# Patient Record
Sex: MALE | NOT HISPANIC OR LATINO | URBAN - METROPOLITAN AREA
[De-identification: names, ages, dates, MRNs, and addresses within clinical notes are randomized per-mention and may not be internally consistent; named-entity substitution may affect disease eponyms.]

---

## 2022-07-20 DIAGNOSIS — Z12.11 ENCOUNTER FOR SCREENING FOR MALIGNANT NEOPLASM OF COLON: Primary | ICD-10-CM

## 2025-02-04 ENCOUNTER — HOSPITAL ENCOUNTER (INPATIENT)
Facility: HOSPITAL | Age: 65
LOS: 17 days | Discharge: NURSING, REHAB OR LONG-TERM CARE | DRG: 003 | End: 2025-02-21
Attending: EMERGENCY MEDICINE | Admitting: SURGERY
Payer: COMMERCIAL

## 2025-02-04 ENCOUNTER — ANESTHESIA (OUTPATIENT)
Dept: SURGERY | Facility: HOSPITAL | Age: 65
DRG: 003 | End: 2025-02-04
Payer: COMMERCIAL

## 2025-02-04 ENCOUNTER — ANESTHESIA EVENT (OUTPATIENT)
Dept: SURGERY | Facility: HOSPITAL | Age: 65
DRG: 003 | End: 2025-02-04
Payer: COMMERCIAL

## 2025-02-04 DIAGNOSIS — I49.8 BIGEMINY: ICD-10-CM

## 2025-02-04 DIAGNOSIS — S02.119A CLOSED FRACTURE OF OCCIPITAL BONE, UNSPECIFIED LATERALITY, UNSPECIFIED OCCIPITAL FRACTURE TYPE, INITIAL ENCOUNTER: ICD-10-CM

## 2025-02-04 DIAGNOSIS — S22.31XA CLOSED FRACTURE OF ONE RIB OF RIGHT SIDE, INITIAL ENCOUNTER: ICD-10-CM

## 2025-02-04 DIAGNOSIS — T14.90XA TRAUMA: ICD-10-CM

## 2025-02-04 DIAGNOSIS — I63.9 STROKE: ICD-10-CM

## 2025-02-04 DIAGNOSIS — S06.5XAA SUBDURAL HEMATOMA: Primary | ICD-10-CM

## 2025-02-04 DIAGNOSIS — S06.339A: ICD-10-CM

## 2025-02-04 DIAGNOSIS — T79.9XXA EARLY COMPLICATION OF TRAUMA, INITIAL ENCOUNTER: ICD-10-CM

## 2025-02-04 DIAGNOSIS — R40.2430 GLASGOW COMA SCALE TOTAL SCORE 3-8, UNSPECIFIED COMA TIMING: ICD-10-CM

## 2025-02-04 PROBLEM — I60.9 SAH (SUBARACHNOID HEMORRHAGE): Status: ACTIVE | Noted: 2025-02-04

## 2025-02-04 LAB
ABORH RETYPE: NORMAL
ALLENS TEST BLOOD GAS (OHS): ABNORMAL
ANION GAP SERPL CALC-SCNC: 16 MEQ/L
APTT PPP: 35 SECONDS (ref 23.2–33.7)
BACTERIA #/AREA URNS AUTO: ABNORMAL /HPF
BASE EXCESS BLD CALC-SCNC: 1.6 MMOL/L (ref -2–2)
BASOPHILS # BLD AUTO: 0.03 X10(3)/MCL
BASOPHILS NFR BLD AUTO: 0.2 %
BILIRUB UR QL STRIP.AUTO: NEGATIVE
BLOOD GAS SAMPLE TYPE (OHS): ABNORMAL
BUN SERPL-MCNC: 22.5 MG/DL (ref 8.4–25.7)
CA-I BLD-SCNC: 1.16 MMOL/L (ref 1.12–1.23)
CALCIUM SERPL-MCNC: 8.9 MG/DL (ref 8.8–10)
CHLORIDE SERPL-SCNC: 103 MMOL/L (ref 98–107)
CLARITY UR: ABNORMAL
CO2 BLDA-SCNC: 25.6 MMOL/L
CO2 SERPL-SCNC: 21 MMOL/L (ref 23–31)
COHGB MFR BLDA: 2.3 % (ref 0.5–1.5)
COLOR UR AUTO: ABNORMAL
CREAT SERPL-MCNC: 1.12 MG/DL (ref 0.72–1.25)
CREAT/UREA NIT SERPL: 20
DRAWN BY BLOOD GAS (OHS): ABNORMAL
EOSINOPHIL # BLD AUTO: 0 X10(3)/MCL (ref 0–0.9)
EOSINOPHIL NFR BLD AUTO: 0 %
ERYTHROCYTE [DISTWIDTH] IN BLOOD BY AUTOMATED COUNT: 13.9 % (ref 11.5–17)
GFR SERPLBLD CREATININE-BSD FMLA CKD-EPI: >60 ML/MIN/1.73/M2
GLUCOSE SERPL-MCNC: 249 MG/DL (ref 82–115)
GLUCOSE UR QL STRIP: ABNORMAL
GROUP & RH: NORMAL
HCO3 BLDA-SCNC: 24.6 MMOL/L (ref 22–26)
HCT VFR BLD AUTO: 40.3 % (ref 42–52)
HGB BLD-MCNC: 14.3 G/DL (ref 14–18)
HGB UR QL STRIP: ABNORMAL
IMM GRANULOCYTES # BLD AUTO: 0.04 X10(3)/MCL (ref 0–0.04)
IMM GRANULOCYTES NFR BLD AUTO: 0.3 %
INDIRECT COOMBS: NORMAL
INHALED O2 CONCENTRATION: 30 %
INR PPP: 1.2
KETONES UR QL STRIP: ABNORMAL
LACTATE SERPL-SCNC: 1.8 MMOL/L (ref 0.5–2.2)
LEUKOCYTE ESTERASE UR QL STRIP: 75
LYMPHOCYTES # BLD AUTO: 1.25 X10(3)/MCL (ref 0.6–4.6)
LYMPHOCYTES NFR BLD AUTO: 10.3 %
MCH RBC QN AUTO: 30.1 PG (ref 27–31)
MCHC RBC AUTO-ENTMCNC: 35.5 G/DL (ref 33–36)
MCV RBC AUTO: 84.8 FL (ref 80–94)
MECH RR (OHS): 20 B/MIN
METHGB MFR BLDA: 0.9 % (ref 0.4–1.5)
MODE (OHS): AC
MONOCYTES # BLD AUTO: 0.95 X10(3)/MCL (ref 0.1–1.3)
MONOCYTES NFR BLD AUTO: 7.8 %
MUCOUS THREADS URNS QL MICRO: ABNORMAL /LPF
NEUTROPHILS # BLD AUTO: 9.9 X10(3)/MCL (ref 2.1–9.2)
NEUTROPHILS NFR BLD AUTO: 81.4 %
NITRITE UR QL STRIP: NEGATIVE
NRBC BLD AUTO-RTO: 0 %
O2 HB BLOOD GAS (OHS): 94.4 % (ref 94–97)
OXYHGB MFR BLDA: 13.6 G/DL (ref 12–16)
PCO2 BLDA: 33 MMHG (ref 35–45)
PEEP RESPIRATORY: 5 CMH2O
PH BLDA: 7.48 [PH] (ref 7.35–7.45)
PH UR STRIP: 5.5 [PH]
PLATELET # BLD AUTO: 234 X10(3)/MCL (ref 130–400)
PMV BLD AUTO: 9.6 FL (ref 7.4–10.4)
PO2 BLDA: 74 MMHG (ref 80–100)
POCT GLUCOSE: 288 MG/DL (ref 70–110)
POTASSIUM BLOOD GAS (OHS): 3.5 MMOL/L (ref 3.5–5)
POTASSIUM SERPL-SCNC: 3.5 MMOL/L (ref 3.5–5.1)
PROT UR QL STRIP: ABNORMAL
PROTHROMBIN TIME: 15.3 SECONDS (ref 12.5–14.5)
RBC # BLD AUTO: 4.75 X10(6)/MCL (ref 4.7–6.1)
RBC #/AREA URNS AUTO: >100 /HPF
SAMPLE SITE BLOOD GAS (OHS): ABNORMAL
SAO2 % BLDA: 95.7 %
SODIUM BLOOD GAS (OHS): 135 MMOL/L (ref 137–145)
SODIUM SERPL-SCNC: 140 MMOL/L (ref 136–145)
SP GR UR STRIP.AUTO: 1.04 (ref 1–1.03)
SPECIMEN OUTDATE: NORMAL
SPONT+MECH VT ON VENT: 500 ML
SQUAMOUS #/AREA URNS LPF: ABNORMAL /HPF
UROBILINOGEN UR STRIP-ACNC: NORMAL
WBC # BLD AUTO: 12.17 X10(3)/MCL (ref 4.5–11.5)
WBC #/AREA URNS AUTO: ABNORMAL /HPF

## 2025-02-04 PROCEDURE — 86900 BLOOD TYPING SEROLOGIC ABO: CPT | Performed by: ANESTHESIOLOGY

## 2025-02-04 PROCEDURE — 36000711: Performed by: NEUROLOGICAL SURGERY

## 2025-02-04 PROCEDURE — 25000003 PHARM REV CODE 250

## 2025-02-04 PROCEDURE — 63600175 PHARM REV CODE 636 W HCPCS

## 2025-02-04 PROCEDURE — 63600175 PHARM REV CODE 636 W HCPCS: Performed by: NEUROLOGICAL SURGERY

## 2025-02-04 PROCEDURE — 27200966 HC CLOSED SUCTION SYSTEM

## 2025-02-04 PROCEDURE — 25000003 PHARM REV CODE 250: Performed by: SURGERY

## 2025-02-04 PROCEDURE — 83605 ASSAY OF LACTIC ACID: CPT

## 2025-02-04 PROCEDURE — 80048 BASIC METABOLIC PNL TOTAL CA: CPT | Performed by: EMERGENCY MEDICINE

## 2025-02-04 PROCEDURE — 99223 1ST HOSP IP/OBS HIGH 75: CPT | Mod: FS,,, | Performed by: PSYCHIATRY & NEUROLOGY

## 2025-02-04 PROCEDURE — 36415 COLL VENOUS BLD VENIPUNCTURE: CPT

## 2025-02-04 PROCEDURE — 99900035 HC TECH TIME PER 15 MIN (STAT)

## 2025-02-04 PROCEDURE — 94760 N-INVAS EAR/PLS OXIMETRY 1: CPT

## 2025-02-04 PROCEDURE — 63600175 PHARM REV CODE 636 W HCPCS: Performed by: NURSE ANESTHETIST, CERTIFIED REGISTERED

## 2025-02-04 PROCEDURE — 36000710: Performed by: NEUROLOGICAL SURGERY

## 2025-02-04 PROCEDURE — 27201423 OPTIME MED/SURG SUP & DEVICES STERILE SUPPLY: Performed by: NEUROLOGICAL SURGERY

## 2025-02-04 PROCEDURE — 87075 CULTR BACTERIA EXCEPT BLOOD: CPT | Performed by: NEUROLOGICAL SURGERY

## 2025-02-04 PROCEDURE — 99900031 HC PATIENT EDUCATION (STAT)

## 2025-02-04 PROCEDURE — 81001 URINALYSIS AUTO W/SCOPE: CPT | Performed by: SURGERY

## 2025-02-04 PROCEDURE — 94002 VENT MGMT INPAT INIT DAY: CPT

## 2025-02-04 PROCEDURE — 27100171 HC OXYGEN HIGH FLOW UP TO 24 HOURS

## 2025-02-04 PROCEDURE — 25000003 PHARM REV CODE 250: Performed by: NURSE ANESTHETIST, CERTIFIED REGISTERED

## 2025-02-04 PROCEDURE — 37000008 HC ANESTHESIA 1ST 15 MINUTES: Performed by: NEUROLOGICAL SURGERY

## 2025-02-04 PROCEDURE — 99285 EMERGENCY DEPT VISIT HI MDM: CPT | Mod: 25

## 2025-02-04 PROCEDURE — 37000009 HC ANESTHESIA EA ADD 15 MINS: Performed by: NEUROLOGICAL SURGERY

## 2025-02-04 PROCEDURE — 36415 COLL VENOUS BLD VENIPUNCTURE: CPT | Performed by: SURGERY

## 2025-02-04 PROCEDURE — 00C40ZZ EXTIRPATION OF MATTER FROM INTRACRANIAL SUBDURAL SPACE, OPEN APPROACH: ICD-10-PCS | Performed by: NEUROLOGICAL SURGERY

## 2025-02-04 PROCEDURE — 99900026 HC AIRWAY MAINTENANCE (STAT)

## 2025-02-04 PROCEDURE — 5A1955Z RESPIRATORY VENTILATION, GREATER THAN 96 CONSECUTIVE HOURS: ICD-10-PCS | Performed by: SURGERY

## 2025-02-04 PROCEDURE — 20000000 HC ICU ROOM

## 2025-02-04 PROCEDURE — D9220A PRA ANESTHESIA: Mod: CRNA,,, | Performed by: NURSE ANESTHETIST, CERTIFIED REGISTERED

## 2025-02-04 PROCEDURE — 87070 CULTURE OTHR SPECIMN AEROBIC: CPT | Performed by: NEUROLOGICAL SURGERY

## 2025-02-04 PROCEDURE — 86923 COMPATIBILITY TEST ELECTRIC: CPT | Mod: 91 | Performed by: ANESTHESIOLOGY

## 2025-02-04 PROCEDURE — 82803 BLOOD GASES ANY COMBINATION: CPT

## 2025-02-04 PROCEDURE — D9220A PRA ANESTHESIA: Mod: ANES,,, | Performed by: ANESTHESIOLOGY

## 2025-02-04 PROCEDURE — 99223 1ST HOSP IP/OBS HIGH 75: CPT | Mod: FS,,, | Performed by: SURGERY

## 2025-02-04 PROCEDURE — 85025 COMPLETE CBC W/AUTO DIFF WBC: CPT | Performed by: EMERGENCY MEDICINE

## 2025-02-04 PROCEDURE — 85730 THROMBOPLASTIN TIME PARTIAL: CPT | Performed by: EMERGENCY MEDICINE

## 2025-02-04 PROCEDURE — 63600175 PHARM REV CODE 636 W HCPCS: Performed by: EMERGENCY MEDICINE

## 2025-02-04 PROCEDURE — 94761 N-INVAS EAR/PLS OXIMETRY MLT: CPT

## 2025-02-04 PROCEDURE — 96374 THER/PROPH/DIAG INJ IV PUSH: CPT

## 2025-02-04 PROCEDURE — 63600175 PHARM REV CODE 636 W HCPCS: Performed by: NURSE PRACTITIONER

## 2025-02-04 PROCEDURE — 27800903 OPTIME MED/SURG SUP & DEVICES OTHER IMPLANTS: Performed by: NEUROLOGICAL SURGERY

## 2025-02-04 PROCEDURE — 20800000 HC ICU TRAUMA

## 2025-02-04 PROCEDURE — 37799 UNLISTED PX VASCULAR SURGERY: CPT

## 2025-02-04 PROCEDURE — 0BH17EZ INSERTION OF ENDOTRACHEAL AIRWAY INTO TRACHEA, VIA NATURAL OR ARTIFICIAL OPENING: ICD-10-PCS | Performed by: SURGERY

## 2025-02-04 PROCEDURE — 00U20KZ SUPPLEMENT DURA MATER WITH NONAUTOLOGOUS TISSUE SUBSTITUTE, OPEN APPROACH: ICD-10-PCS | Performed by: NEUROLOGICAL SURGERY

## 2025-02-04 PROCEDURE — 85610 PROTHROMBIN TIME: CPT | Performed by: EMERGENCY MEDICINE

## 2025-02-04 DEVICE — DURAMATRIX ONLAY PLUS 4X5: Type: IMPLANTABLE DEVICE | Site: CRANIAL | Status: FUNCTIONAL

## 2025-02-04 RX ORDER — CEFAZOLIN 2 G/1
2 INJECTION, POWDER, FOR SOLUTION INTRAMUSCULAR; INTRAVENOUS
Status: COMPLETED | OUTPATIENT
Start: 2025-02-04 | End: 2025-02-05

## 2025-02-04 RX ORDER — METHOCARBAMOL 500 MG/1
500 TABLET, FILM COATED ORAL EVERY 8 HOURS
Status: DISCONTINUED | OUTPATIENT
Start: 2025-02-04 | End: 2025-02-06

## 2025-02-04 RX ORDER — LORAZEPAM 1 MG/1
2 TABLET ORAL EVERY 4 HOURS PRN
Status: DISCONTINUED | OUTPATIENT
Start: 2025-02-04 | End: 2025-02-05

## 2025-02-04 RX ORDER — FAMOTIDINE 10 MG/ML
20 INJECTION INTRAVENOUS 2 TIMES DAILY
Status: DISCONTINUED | OUTPATIENT
Start: 2025-02-04 | End: 2025-02-06

## 2025-02-04 RX ORDER — SODIUM CHLORIDE 9 MG/ML
INJECTION, SOLUTION INTRAVENOUS CONTINUOUS
Status: DISCONTINUED | OUTPATIENT
Start: 2025-02-04 | End: 2025-02-06

## 2025-02-04 RX ORDER — PROCHLORPERAZINE EDISYLATE 5 MG/ML
5 INJECTION INTRAMUSCULAR; INTRAVENOUS EVERY 6 HOURS PRN
Status: DISCONTINUED | OUTPATIENT
Start: 2025-02-04 | End: 2025-02-21 | Stop reason: HOSPADM

## 2025-02-04 RX ORDER — ONDANSETRON HYDROCHLORIDE 2 MG/ML
4 INJECTION, SOLUTION INTRAVENOUS DAILY PRN
Status: CANCELLED | OUTPATIENT
Start: 2025-02-04

## 2025-02-04 RX ORDER — HYDROMORPHONE HYDROCHLORIDE 2 MG/ML
0.2 INJECTION, SOLUTION INTRAMUSCULAR; INTRAVENOUS; SUBCUTANEOUS EVERY 5 MIN PRN
Status: CANCELLED | OUTPATIENT
Start: 2025-02-04

## 2025-02-04 RX ORDER — ROCURONIUM BROMIDE 10 MG/ML
INJECTION, SOLUTION INTRAVENOUS
Status: DISCONTINUED | OUTPATIENT
Start: 2025-02-04 | End: 2025-02-04

## 2025-02-04 RX ORDER — ONDANSETRON HYDROCHLORIDE 2 MG/ML
4 INJECTION, SOLUTION INTRAVENOUS EVERY 6 HOURS PRN
Status: DISCONTINUED | OUTPATIENT
Start: 2025-02-04 | End: 2025-02-21 | Stop reason: HOSPADM

## 2025-02-04 RX ORDER — POLYETHYLENE GLYCOL 3350 17 G/17G
17 POWDER, FOR SOLUTION ORAL 2 TIMES DAILY
Status: DISCONTINUED | OUTPATIENT
Start: 2025-02-04 | End: 2025-02-06

## 2025-02-04 RX ORDER — LIDOCAINE HYDROCHLORIDE AND EPINEPHRINE 5; 5 MG/ML; UG/ML
INJECTION, SOLUTION INFILTRATION; PERINEURAL
Status: DISCONTINUED | OUTPATIENT
Start: 2025-02-04 | End: 2025-02-04 | Stop reason: HOSPADM

## 2025-02-04 RX ORDER — PROPOFOL 10 MG/ML
0-50 INJECTION, EMULSION INTRAVENOUS CONTINUOUS
Status: DISCONTINUED | OUTPATIENT
Start: 2025-02-04 | End: 2025-02-06

## 2025-02-04 RX ORDER — CALCIUM CHLORIDE INJECTION 100 MG/ML
INJECTION, SOLUTION INTRAVENOUS
Status: DISCONTINUED | OUTPATIENT
Start: 2025-02-04 | End: 2025-02-04

## 2025-02-04 RX ORDER — CEFAZOLIN SODIUM 1 G/3ML
INJECTION, POWDER, FOR SOLUTION INTRAMUSCULAR; INTRAVENOUS
Status: DISCONTINUED | OUTPATIENT
Start: 2025-02-04 | End: 2025-02-04 | Stop reason: HOSPADM

## 2025-02-04 RX ORDER — DOCUSATE SODIUM 100 MG/1
100 CAPSULE, LIQUID FILLED ORAL 2 TIMES DAILY
Status: DISCONTINUED | OUTPATIENT
Start: 2025-02-04 | End: 2025-02-06

## 2025-02-04 RX ORDER — TALC
6 POWDER (GRAM) TOPICAL NIGHTLY PRN
Status: DISCONTINUED | OUTPATIENT
Start: 2025-02-04 | End: 2025-02-12

## 2025-02-04 RX ORDER — ACETAMINOPHEN 650 MG/20.3ML
650 LIQUID ORAL EVERY 4 HOURS
Status: DISPENSED | OUTPATIENT
Start: 2025-02-04 | End: 2025-02-09

## 2025-02-04 RX ORDER — THIAMINE HCL 100 MG
100 TABLET ORAL DAILY
Status: DISCONTINUED | OUTPATIENT
Start: 2025-02-05 | End: 2025-02-06

## 2025-02-04 RX ORDER — PROPOFOL 10 MG/ML
VIAL (ML) INTRAVENOUS
Status: DISCONTINUED | OUTPATIENT
Start: 2025-02-04 | End: 2025-02-04

## 2025-02-04 RX ORDER — NICARDIPINE HYDROCHLORIDE 0.2 MG/ML
INJECTION INTRAVENOUS
Status: COMPLETED
Start: 2025-02-04 | End: 2025-02-04

## 2025-02-04 RX ORDER — INSULIN ASPART 100 [IU]/ML
0-5 INJECTION, SOLUTION INTRAVENOUS; SUBCUTANEOUS EVERY 6 HOURS PRN
Status: DISCONTINUED | OUTPATIENT
Start: 2025-02-04 | End: 2025-02-06

## 2025-02-04 RX ORDER — ONDANSETRON HYDROCHLORIDE 2 MG/ML
INJECTION, SOLUTION INTRAMUSCULAR; INTRAVENOUS
Status: DISCONTINUED | OUTPATIENT
Start: 2025-02-04 | End: 2025-02-04

## 2025-02-04 RX ORDER — OXYCODONE HYDROCHLORIDE 5 MG/1
5 TABLET ORAL EVERY 4 HOURS PRN
Status: DISCONTINUED | OUTPATIENT
Start: 2025-02-04 | End: 2025-02-12

## 2025-02-04 RX ORDER — LIDOCAINE HYDROCHLORIDE 20 MG/ML
INJECTION, SOLUTION EPIDURAL; INFILTRATION; INTRACAUDAL; PERINEURAL
Status: DISCONTINUED | OUTPATIENT
Start: 2025-02-04 | End: 2025-02-04

## 2025-02-04 RX ORDER — GLUCAGON 1 MG
1 KIT INJECTION
Status: DISCONTINUED | OUTPATIENT
Start: 2025-02-04 | End: 2025-02-06

## 2025-02-04 RX ORDER — CEFAZOLIN SODIUM 1 G/3ML
INJECTION, POWDER, FOR SOLUTION INTRAMUSCULAR; INTRAVENOUS
Status: DISCONTINUED | OUTPATIENT
Start: 2025-02-04 | End: 2025-02-04

## 2025-02-04 RX ORDER — FENTANYL CITRATE 50 UG/ML
INJECTION, SOLUTION INTRAMUSCULAR; INTRAVENOUS
Status: DISCONTINUED | OUTPATIENT
Start: 2025-02-04 | End: 2025-02-04

## 2025-02-04 RX ORDER — BISACODYL 10 MG/1
10 SUPPOSITORY RECTAL DAILY PRN
Status: DISCONTINUED | OUTPATIENT
Start: 2025-02-04 | End: 2025-02-21 | Stop reason: HOSPADM

## 2025-02-04 RX ORDER — MORPHINE SULFATE 4 MG/ML
2 INJECTION, SOLUTION INTRAMUSCULAR; INTRAVENOUS
Status: DISPENSED | OUTPATIENT
Start: 2025-02-04 | End: 2025-02-07

## 2025-02-04 RX ORDER — MUPIROCIN 20 MG/G
OINTMENT TOPICAL 2 TIMES DAILY
Status: COMPLETED | OUTPATIENT
Start: 2025-02-04 | End: 2025-02-09

## 2025-02-04 RX ORDER — ACETAMINOPHEN 325 MG/1
650 TABLET ORAL EVERY 4 HOURS PRN
Status: DISCONTINUED | OUTPATIENT
Start: 2025-02-04 | End: 2025-02-12

## 2025-02-04 RX ORDER — ENALAPRILAT 1.25 MG/ML
1.25 INJECTION INTRAVENOUS EVERY 4 HOURS PRN
Status: DISCONTINUED | OUTPATIENT
Start: 2025-02-04 | End: 2025-02-21 | Stop reason: HOSPADM

## 2025-02-04 RX ORDER — NICARDIPINE HYDROCHLORIDE 0.2 MG/ML
0-15 INJECTION INTRAVENOUS CONTINUOUS
Status: DISCONTINUED | OUTPATIENT
Start: 2025-02-04 | End: 2025-02-06

## 2025-02-04 RX ORDER — HYDRALAZINE HYDROCHLORIDE 20 MG/ML
10 INJECTION INTRAMUSCULAR; INTRAVENOUS EVERY 4 HOURS PRN
Status: DISCONTINUED | OUTPATIENT
Start: 2025-02-04 | End: 2025-02-21 | Stop reason: HOSPADM

## 2025-02-04 RX ORDER — OXYCODONE AND ACETAMINOPHEN 5; 325 MG/1; MG/1
1 TABLET ORAL
Status: CANCELLED | OUTPATIENT
Start: 2025-02-04

## 2025-02-04 RX ORDER — PHENYLEPHRINE HCL IN 0.9% NACL 1 MG/10 ML
SYRINGE (ML) INTRAVENOUS
Status: DISCONTINUED | OUTPATIENT
Start: 2025-02-04 | End: 2025-02-04

## 2025-02-04 RX ORDER — LABETALOL HYDROCHLORIDE 5 MG/ML
20 INJECTION, SOLUTION INTRAVENOUS EVERY 4 HOURS PRN
Status: DISCONTINUED | OUTPATIENT
Start: 2025-02-04 | End: 2025-02-21 | Stop reason: HOSPADM

## 2025-02-04 RX ORDER — HYDRALAZINE HYDROCHLORIDE 20 MG/ML
INJECTION INTRAMUSCULAR; INTRAVENOUS
Status: COMPLETED
Start: 2025-02-04 | End: 2025-02-04

## 2025-02-04 RX ORDER — ACETAMINOPHEN 10 MG/ML
1000 INJECTION, SOLUTION INTRAVENOUS ONCE
Status: COMPLETED | OUTPATIENT
Start: 2025-02-04 | End: 2025-02-04

## 2025-02-04 RX ORDER — ACETAMINOPHEN 650 MG/1
650 SUPPOSITORY RECTAL EVERY 4 HOURS PRN
Status: DISCONTINUED | OUTPATIENT
Start: 2025-02-04 | End: 2025-02-21 | Stop reason: HOSPADM

## 2025-02-04 RX ORDER — PROPOFOL 10 MG/ML
INJECTION, EMULSION INTRAVENOUS
Status: COMPLETED
Start: 2025-02-04 | End: 2025-02-04

## 2025-02-04 RX ORDER — FOLIC ACID 1 MG/1
1 TABLET ORAL DAILY
Status: DISCONTINUED | OUTPATIENT
Start: 2025-02-05 | End: 2025-02-06

## 2025-02-04 RX ADMIN — LIDOCAINE HYDROCHLORIDE 80 MG: 20 INJECTION, SOLUTION INTRAVENOUS at 02:02

## 2025-02-04 RX ADMIN — METHOCARBAMOL 500 MG: 500 TABLET ORAL at 09:02

## 2025-02-04 RX ADMIN — PROPOFOL 25 MCG/KG/MIN: 10 INJECTION, EMULSION INTRAVENOUS at 05:02

## 2025-02-04 RX ADMIN — Medication 100 MCG: at 03:02

## 2025-02-04 RX ADMIN — FENTANYL CITRATE 50 MCG: 50 INJECTION, SOLUTION INTRAMUSCULAR; INTRAVENOUS at 03:02

## 2025-02-04 RX ADMIN — ACETAMINOPHEN 650 MG: 650 SOLUTION ORAL at 05:02

## 2025-02-04 RX ADMIN — ACETAMINOPHEN 1000 MG: 10 INJECTION, SOLUTION INTRAVENOUS at 01:02

## 2025-02-04 RX ADMIN — PHENYLEPHRINE HYDROCHLORIDE 10 MCG/MIN: 10 INJECTION INTRAVENOUS at 03:02

## 2025-02-04 RX ADMIN — Medication 100 MCG: at 02:02

## 2025-02-04 RX ADMIN — MUPIROCIN: 20 OINTMENT TOPICAL at 09:02

## 2025-02-04 RX ADMIN — SODIUM CHLORIDE, SODIUM GLUCONATE, SODIUM ACETATE, POTASSIUM CHLORIDE AND MAGNESIUM CHLORIDE: 526; 502; 368; 37; 30 INJECTION, SOLUTION INTRAVENOUS at 02:02

## 2025-02-04 RX ADMIN — CEFAZOLIN 2 G: 2 INJECTION, POWDER, FOR SOLUTION INTRAMUSCULAR; INTRAVENOUS at 11:02

## 2025-02-04 RX ADMIN — HYDRALAZINE HYDROCHLORIDE 10 MG: 20 INJECTION INTRAMUSCULAR; INTRAVENOUS at 05:02

## 2025-02-04 RX ADMIN — NICARDIPINE HYDROCHLORIDE 5 MG/HR: 0.2 INJECTION, SOLUTION INTRAVENOUS at 05:02

## 2025-02-04 RX ADMIN — FAMOTIDINE 20 MG: 10 INJECTION, SOLUTION INTRAVENOUS at 09:02

## 2025-02-04 RX ADMIN — ONDANSETRON 4 MG: 2 INJECTION INTRAMUSCULAR; INTRAVENOUS at 04:02

## 2025-02-04 RX ADMIN — CEFAZOLIN 2 G: 330 INJECTION, POWDER, FOR SOLUTION INTRAMUSCULAR; INTRAVENOUS at 02:02

## 2025-02-04 RX ADMIN — SODIUM CHLORIDE: 9 INJECTION, SOLUTION INTRAVENOUS at 05:02

## 2025-02-04 RX ADMIN — ROCURONIUM BROMIDE 60 MG: 10 SOLUTION INTRAVENOUS at 02:02

## 2025-02-04 RX ADMIN — POLYETHYLENE GLYCOL 3350 17 G: 17 POWDER, FOR SOLUTION ORAL at 09:02

## 2025-02-04 RX ADMIN — LEVETIRACETAM 500 MG: 100 INJECTION, SOLUTION INTRAVENOUS at 09:02

## 2025-02-04 RX ADMIN — CALCIUM CHLORIDE INJECTION 0.5 G: 100 INJECTION, SOLUTION INTRAVENOUS at 02:02

## 2025-02-04 RX ADMIN — ROCURONIUM BROMIDE 20 MG: 10 SOLUTION INTRAVENOUS at 03:02

## 2025-02-04 RX ADMIN — PROPOFOL 50 MG: 10 INJECTION, EMULSION INTRAVENOUS at 02:02

## 2025-02-04 RX ADMIN — ACETAMINOPHEN 650 MG: 650 SOLUTION ORAL at 09:02

## 2025-02-04 RX ADMIN — LABETALOL HYDROCHLORIDE 20 MG: 5 INJECTION, SOLUTION INTRAVENOUS at 05:02

## 2025-02-04 RX ADMIN — DOCUSATE SODIUM 100 MG: 100 CAPSULE, LIQUID FILLED ORAL at 09:02

## 2025-02-04 RX ADMIN — NICARDIPINE HYDROCHLORIDE 5 MG/HR: 0.2 INJECTION INTRAVENOUS at 05:02

## 2025-02-04 RX ADMIN — CALCIUM CHLORIDE INJECTION 0.25 G: 100 INJECTION, SOLUTION INTRAVENOUS at 03:02

## 2025-02-04 RX ADMIN — FENTANYL CITRATE 50 MCG: 50 INJECTION, SOLUTION INTRAMUSCULAR; INTRAVENOUS at 02:02

## 2025-02-04 NOTE — CARE UPDATE
Patient's wife Reina has arrived to the ED  I updated her on the current events and his poor neurologic exam  We discussed his SDH, IPH and the severity of his brain injury  We discussed goals going forward including surgery should she want aggressive treatment.  She voiced understanding that while his chance of meaningful recovery is likely poor, she wishes to pursue surgery.    Repeat CT head complete:   Impression:       1. Stable appearance of the bifrontal hemorrhagic contusions, left cerebral intraparenchymal hemorrhage, predominantly right frontal subdural hematoma and extensive multifocal subarachnoid blood products with trace intraventricular blood products.  There has been no new hemorrhage.  The 8 mm of right to left midline shift is stable from the prior study.  2. Unchanged nondisplaced left occipital bone fracture with extension into foramen magnum.     Dr. Garcia has been following  He will be taking him to the OR for emergent crani for SDH evacuation  Consents placed on chart

## 2025-02-04 NOTE — ANESTHESIA PREPROCEDURE EVALUATION
[facility] - Emergency Dept  Neurosurgery  Consult Note    Consults  Subjective:     Chief Complaint/Reason for Admission: SDH, IPH    History of Present Illness: This patient presents as a transfer from [facility] for traumatic ICH. Patient was found down by wife this morning, underneath the table next to the bed with vomitus present. Arrived at the [facility] a proximally 5:00 a.m.. Secondary to low GCS, patient was immediately intubated for airway protection. Workup revealed subdural hematoma with shift, intraventricular blood, multiple cerebral contusions and bilateral subarachnoid hemorrhage along with a skull fracture, and rib fracture. [facility] consulted their neurosurgeon, who was concerned about possibility of an aneurysm. A CTA of the head was performed which would did not reveal an aneurysm. Neurosurgeon re-contacted, and requested a 4 vessel cerebral angiogram which the [facility] was not able to perform. Thus the patient was transferred. Transferring ED physician states that the patient was taken off of propofol, and remained comatose in their ED but did have a cough and gag reflex. Dr. Jara was consulted for evaluation and treatment recommendations.    On PE in the ED he is intubated, propofol held for 30 min. He is decerebrate in bilateral UE. Triple flexion vs withdrawal in right LE. No response to pain in left LE. Pupils 5mm bilateral, reactive. Downward gaze present. +cough, corneal and gag.    Review of patient's allergies indicates:  No Known Allergies    No past medical history on file.  No past surgical history on file.  Family History    None       Tobacco Use    Smoking status: Not on file    Smokeless tobacco: Not on file   Substance and Sexual Activity    Alcohol use: Not on file    Drug use: Not on file    Sexual activity: Not on file     Review of Systems  Objective:   Unable to obtain d/t patient condition    Weight: 80 kg (176 lb 5.9  "oz)  Body mass index is 23.92 kg/m².  Vital Signs (Most Recent):  Temp: (!) 38.7 °C (101.7 °F) (02/04/25 1253)  Pulse: (!) 117 (02/04/25 1240)  Resp: (!) 26 (02/04/25 1240)  BP: (!) 148/93 (02/04/25 1234)  SpO2: 96 % (02/04/25 1240) Vital Signs (24h Range):  Temp:  [36.9 °C (98.4 °F)-38.7 °C (101.7 °F)] 38.7 °C (101.7 °F)  Pulse:  [117-120] 117  Resp:  [24-26] 26  SpO2:  [96 %-99 %] 96 %  BP: (148)/(93) 148/93                Vent Mode: A/C  Oxygen Concentration (%):  [40-50] 50  Resp Rate Total:  [26 br/min] 26 br/min  Vt Set:  [500 mL] 500 mL  PEEP/CPAP:  [5 cmH20] 5 cmH20  Mean Airway Pressure:  [10 cmH20] 10 cmH20             Physical Exam:    Musculoskeletal:        Neck: Range of motion is Normal ROM.     AFVSS  Pupils 5mm bilateral, sluggish  Downward gaze preference  He does not open his eyes to noxious stimuli.  Decerebrate posturing bilateral UE.  Withdrawal vs triple flexion right LE  Flaccid left LE  +cough, corneal and gag    Significant Labs:  Recent Labs   Lab 02/04/25  0857      K 4.6   CL 96*   CO2 22   BUN 19   CREATININE 0.95   CALCIUM 8.7*     No results for input(s): "WBC", "HGB", "HCT", "PLT" in the last 48 hours.  Recent Labs   Lab 02/04/25  0527 02/04/25  0636   INR 1.1 1     Microbiology Results (last 7 days)       ** No results found for the last 168 hours. **          Significant Diagnostics:      Assessment/Plan:     Patient currently with GCS 4T; propofol held for 30 min  CT from outside hospital unable to be viewed at this time  We have ordered STAT repeat CT head now  Further recs to follow per Dr. Garcia once CT available  Continue to hold sedation as tolerated for better neuro assessment  Continue BP parameters below 140/90  Keppra BID  Will need to reach family for GOC discussion/surgical decision making    Thank you for your consult. I will follow-up with patient. Please contact us if you have any additional questions.    Sean Dubon MD  Neurosurgery  [facility] - Emergency Dept    Pre-op Assessment          Review of Systems      Physical Exam  General: Well nourished and Alert    Airway:  Mallampati: II / II  Mouth Opening: Normal  TM Distance: Normal  Tongue: Normal  Neck ROM: Normal ROM  Pre-Existing Airway: Oral Endotracheal tube    Dental:  Intact        Anesthesia Plan  Type of Anesthesia, risks & benefits discussed:    Anesthesia Type: Gen ETT  Intra-op Monitoring Plan: Standard ASA Monitors and Art Line  Post Op Pain Control Plan: IV/PO Opioids PRN  Induction:  IV  Airway Plan: Direct  Informed Consent: Informed consent signed with the Patient and all parties understand the risks and agree with anesthesia plan.  All questions answered.   ASA Score: 3    Ready For Surgery From Anesthesia Perspective.     .

## 2025-02-04 NOTE — BRIEF OP NOTE
[facility] - Periop Services  Brief Operative Note    SUMMARY     Surgery Date: 2/4/2025     Surgeons and Role:     * Samir Garcia MD - Primary    Assisting Surgeon: Gabino Escudero PA-C    Pre-op Diagnosis:  Early complication of trauma, initial encounter [T79.9XXA]    Post-op Diagnosis:  Post-Op Diagnosis Codes:     * Early complication of trauma, initial encounter [T79.9XXA]    Procedure(s) (LRB):  CRANIOTOMY, FOR SUBDURAL HEMATOMA EVACUATION (Right)    Right hemicraniectomy for subdural hematoma    Anesthesia: General    Implants:  Implant Name Type Inv. Item Serial No.  Lot No. LRB No. Used Action   DURAMATRIX ONLAY PLUS 4X5 - SN/A  DURAMATRIX ONLAY PLUS 4X5 N/A [facility] [employer] [employer] [id number] Right 1 Implanted       Operative Findings: Dictated    Estimated Blood Loss: * No values recorded between 2/4/2025  3:07 PM and 2/4/2025  4:23 PM *    Estimated Blood Loss has been documented.         Specimens:   Specimen (24h ago, onward)      None            [id number]

## 2025-02-04 NOTE — ED PROVIDER NOTES
Encounter Date: 2/4/2025       History     Chief Complaint   Patient presents with    Transfer     Pt tx from [facility] for SDH. Intbuated on propofol     This patient presents as a transfer from [facility] for traumatic ICH.  Patient was found down by wife this morning, underneath the time next to the bed with vomitus present.  Arrived at the [facility] a proximally 5:00 a.m..  Secondary to low GCS, patient was immediately intubated for airway protection.  Workup revealed subdural hematoma with shift, intraventricular blood, multiple cerebral contusions and bilateral subarachnoid hemorrhage along with a skull fracture, and rib fracture.  [facility] consulted their neurosurgeon, who was concerned about possibility of an aneurysm.  A CTA of the head was performed which would did not reveal an aneurysm.  Neurosurgeon re-contacted, and requested a 4 vessel cerebral angiogram which the [facility] was not able to perform.  Thus the patient was transferred.  Transferring ED physician states that the patient was taken off of propofol, and remained comatose in their ED but did have a cough and gag reflex.      Review of patient's allergies indicates:  No Known Allergies  No past medical history on file.  No past surgical history on file.  No family history on file.     Review of Systems   Unable to perform ROS: Patient unresponsive       Physical Exam     Initial Vitals [02/04/25 1234]   BP Pulse Resp Temp SpO2   (!) 148/93 (!) 120 (!) 24 98.4 °F (36.9 °C) 99 %      MAP       --         Physical Exam    Nursing note and vitals reviewed.  Constitutional: He appears well-developed and well-nourished.   HENT:   Head: Normocephalic. Mouth/Throat: Oropharynx is clear and moist.   Intubated   Eyes:   Pupils are 6 bilaterally react   Neck:   No step-off or deformity   Cardiovascular:  Normal rate, regular rhythm, normal heart sounds and intact distal pulses.           Pulmonary/Chest: Breath sounds normal.  No respiratory distress.   Abdominal: Abdomen is soft. Bowel sounds are normal. There is no abdominal tenderness. There is no rebound and no guarding.   Genitourinary:    Genitourinary Comments: Garcia catheter     Musculoskeletal:         General: No tenderness or edema.      Comments: No spontaneous movement, no obvious swelling, bruising or deformities.  Patient does have small abrasion to the right pretibial area     Neurological: No sensory deficit. GCS score is 15. GCS eye subscore is 4. GCS verbal subscore is 5. GCS motor subscore is 6.   GCS is 3 intubated, pupils are reactive positive deep cough reflex with suctioning   Skin: Skin is warm and dry. Capillary refill takes less than 2 seconds.   Psychiatric:   Nonverbal         ED Course   Critical Care    Date/Time: 2/4/2025 1:24 PM    Performed by: Darrion Rich MD  Authorized by: Darrion Rich MD  Direct patient critical care time: 10 minutes  Additional history critical care time: 5 minutes  Ordering / reviewing critical care time: 10 minutes  Documentation critical care time: 5 minutes  Consulting other physicians critical care time: 12 minutes  Total critical care time (exclusive of procedural time) : 42 minutes  Critical care was necessary to treat or prevent imminent or life-threatening deterioration of the following conditions: trauma and CNS failure or compromise.  Critical care was time spent personally by me on the following activities: discussions with consultants, examination of patient, obtaining history from patient or surrogate, ordering and performing treatments and interventions, ordering and review of laboratory studies, ordering and review of radiographic studies and pulse oximetry.        Labs Reviewed   CBC W/ AUTO DIFFERENTIAL    Narrative:     The following orders were created for panel order CBC auto differential.  Procedure                               Abnormality         Status                     ---------                                -----------         ------                     CBC with Differential[[id number]]                                                        Please view results for these tests on the individual orders.   BASIC METABOLIC PANEL   APTT   PROTIME-INR   BLOOD GAS   CBC WITH DIFFERENTIAL          Imaging Results              X-Ray Chest AP Portable (Final result)  Result time 02/04/25 13:05:36      Final result by Alex Abdi MD (02/04/25 13:05:36)                   Impression:      Suspect some mild left basilar atelectasis.      Electronically signed by: Alex Abdi  Date:    02/04/2025  Time:    13:05               Narrative:    EXAMINATION:  XR CHEST AP PORTABLE    CLINICAL HISTORY:  Transfer, intubated;    COMPARISON:  No priors    FINDINGS:  Frontal view of the chest was obtained. Endotracheal tube tip midthoracic trachea.  Enteric tube extends into the stomach.  The heart is not enlarged.  Suspect some mild left basilar atelectasis.  Lungs otherwise grossly clear.  No pneumothorax.                                       Medications   acetaminophen 1,000 mg/100 mL (10 mg/mL) injection 1,000 mg (1,000 mg Intravenous New Bag 2/4/25 1322)     Medical Decision Making  Amount and/or Complexity of Data Reviewed  Independent Historian: EMS     Details: EMS reports patient's neurologic status during transport as well as immediately prior  External Data Reviewed: labs and radiology.     Details: Reviewed CT reports as well as lab work from transferring hospital  Labs: ordered. Decision-making details documented in ED Course.  Radiology: ordered. Decision-making details documented in ED Course.  Discussion of management or test interpretation with external provider(s): Discussed with neurosurgery will see patient in the ED   Discussed with the Interventional Neurology will evaluate CTA for recommendations   Discussed with Trauma surgery who accepts for admission.    Risk  Decision regarding  hospitalization.    Critical Care  Total time providing critical care: 42 minutes               ED Course as of 02/04/25 1326   Tue Feb 04, 2025   1252 Patient arrives, was on propofol during transport, reportedly had a cough and gag prior but no other responses.  In ED off a propofol pupils are 6 and reactive bilaterally.  Patient has a cough/gag 2 deep suctioning.  None with manipulation of the ET tube.  Patient's GCS otherwise is 3 intubated. [MP]   1302 Discussed with neurosurgery.  Will get repeat CT here, consult vascular Neurology for possible 4 vessel cerebral angiogram. [MP]   1303 Trauma surgery consulted for admission [MP]      ED Course User Index  [MP] Darrion Rich MD                           Clinical Impression:  Final diagnoses:  [S06.5XAA] Subdural hematoma (Primary)  [S06.339A] Contusion of cerebrum with loss of consciousness, unspecified laterality, initial encounter  [S02.119A] Closed fracture of occipital bone, unspecified laterality, unspecified occipital fracture type, initial encounter  [R40.2430] Shashank coma scale total score 3-8, unspecified coma timing  [S22.31XA] Closed fracture of one rib of right side, initial encounter          ED Disposition Condition    Admit Critical                Darrion Rich MD  02/04/25 1326

## 2025-02-04 NOTE — SUBJECTIVE & OBJECTIVE
History reviewed. No pertinent past medical history.    History reviewed. No pertinent surgical history.    Review of patient's allergies indicates:  No Known Allergies      No current facility-administered medications on file prior to encounter.     No current outpatient medications on file prior to encounter.     Family History    None       Tobacco Use    Smoking status: Not on file    Smokeless tobacco: Not on file   Substance and Sexual Activity    Alcohol use: Not on file    Drug use: Not on file    Sexual activity: Not on file     Review of Systems   Unable to perform ROS: Intubated     Objective:     Vital Signs (Most Recent):  Temp: (!) 101.7 °F (38.7 °C) (02/04/25 1253)  Pulse: (!) 117 (02/04/25 1353)  Resp: (!) 25 (02/04/25 1353)  BP: (!) 148/93 (02/04/25 1234)  SpO2: 97 % (02/04/25 1353) Vital Signs (24h Range):  Temp:  [98.4 °F (36.9 °C)-101.7 °F (38.7 °C)] 101.7 °F (38.7 °C)  Pulse:  [117-120] 117  Resp:  [24-26] 25  SpO2:  [96 %-99 %] 97 %  BP: (148)/(93) 148/93     Weight: 80 kg (176 lb 5.9 oz)  Body mass index is 23.92 kg/m².     Physical Exam  Vitals and nursing note reviewed.   Constitutional:       General: He is sleeping.      Appearance: He is ill-appearing.      Interventions: He is intubated.   Pulmonary:      Effort: He is intubated.               Significant Labs: All pertinent lab results from the past 24 hours have been reviewed.    Significant Imaging: I have reviewed all pertinent imaging results/findings within the past 24 hours.

## 2025-02-04 NOTE — CONSULTS
[facility] - [facility]  Neurology  Consult Note    Patient Name: Ezequiel Ramires  MRN: [id number]  Admission Date: 2/4/2025  Hospital Length of Stay: 0 days  Code Status: Full Code   Attending Provider: Maik Moreno Jr., *   Consulting Provider: Carol Ann Snyder NP  Primary Care Physician: Davy Stacy MD  Principal Problem:<principal problem not specified>    Inpatient consult to Neurology Services (Vascular Neurology)  Consult performed by: Carol Ann Snyder NP  Consult ordered by: Darrion Rich MD         Subjective:     Chief Complaint:  Comatose      HPI:   Ezequiel Ramires is a 64 y.o. male presents as a transfer patient from [facility] for traumatic ICH.  Patient was found down by wife this morning underneath the table next to the bed with vomit is present they arrived at the outside facility approximately 5:00 a.m. secondary to low GCS patient was immediately intubated for airway protection.  Continued workup reveals subdural hematoma with shift, intraventricular blood, multiple cerebral contusions and bilateral cerebral subarachnoid hemorrhage along with the skull fracture and rib fracture.  [facility] consulted the neurosurgeon who is concerned about the possibility of an aneurysm.  CTA of head was performed which did not reveal aneurysm.  Neurosurgery was re-contacted and requested a 4 vessel cerebral angiogram which the [facility] was not able to perform.  Transferring ED physician stated that the patient was taken off propofol and remained comatose in the ED but did have a cough and gag reflex.  Patient remains off of sedation in [facility] ED. Interventional Neurology was consulted by Neurosurgery to determine if cerebral angiogram was in order at this time.  No indication for cerebral angiogram indicated at this time and patient was taken today for craniotomy for subdural hematoma evacuation on right.                   History reviewed. No pertinent past  medical history.    History reviewed. No pertinent surgical history.    Review of patient's allergies indicates:  No Known Allergies      No current facility-administered medications on file prior to encounter.     No current outpatient medications on file prior to encounter.     Family History    None       Tobacco Use    Smoking status: Not on file    Smokeless tobacco: Not on file   Substance and Sexual Activity    Alcohol use: Not on file    Drug use: Not on file    Sexual activity: Not on file     Review of Systems   Unable to perform ROS: Intubated     Objective:     Vital Signs (Most Recent):  Temp: (!) 101.7 °F (38.7 °C) (02/04/25 1253)  Pulse: (!) 117 (02/04/25 1353)  Resp: (!) 25 (02/04/25 1353)  BP: (!) 148/93 (02/04/25 1234)  SpO2: 97 % (02/04/25 1353) Vital Signs (24h Range):  Temp:  [98.4 °F (36.9 °C)-101.7 °F (38.7 °C)] 101.7 °F (38.7 °C)  Pulse:  [117-120] 117  Resp:  [24-26] 25  SpO2:  [96 %-99 %] 97 %  BP: (148)/(93) 148/93     Weight: 80 kg (176 lb 5.9 oz)  Body mass index is 23.92 kg/m².     Physical Exam  Vitals and nursing note reviewed.   Constitutional:       General: He is sleeping.      Appearance: He is ill-appearing. Obtuned.      Interventions: He is intubated, on  vent, off sedation.   Pulmonary:      Effort: He is intubated.   -withdraws to pain on right UE, very slight movement left LE to noxious stimuli.   -no movement LUE  -does not open eyes to noxious stimuli  -pupils 5mm bilateral, sluggish with downward gaze    Significant Labs: All pertinent lab results from the past 24 hours have been reviewed.    Significant Imaging: I have reviewed all pertinent imaging results/findings within the past 24 hours.  Assessment and Plan:     SAH (subarachnoid hemorrhage)  IP -   CTH Impression:  1. Stable appearance of the bifrontal hemorrhagic contusions, left cerebral intraparenchymal hemorrhage, predominantly right frontal subdural hematoma and extensive multifocal subarachnoid blood  products with trace intraventricular blood products.  There has been no new hemorrhage.  The 8 mm of right to left midline shift is stable from the prior study.  2. Unchanged nondisplaced left occipital bone fracture with extension into foramen magnum.    Plan:   -hourly neuro checks ... notify neurology of any neuro change  -strict blood pressure control ... -150 for the first 24 hours, then SBP less than 140  -avoid antiplatelet or anticoagulation at this time  -seizure precautions ... notify neurology of any seizure-like activity  -therapy evaluations   -no indication for Cerebral angiogram at this time   -continue care with primary and neurosurgery team post 2/4 craniotomy for subdural hematoma evacuation on right.   - Other recommendations may follow from MD        Current NIH Stroke Score Values      Flowsheet Row Most Recent Value   1a. Level of Consciousness 3-->Responds only with reflex motor or autonomic effects or totally unresponsive, flaccid, and areflexic   1b. LOC Questions 2-->Answers neither question correctly   1c. LOC Commands 2-->Performs neither task correctly   2. Best Gaze 0-->Normal   3. Visual 0-->No visual loss   4. Facial Palsy 0-->Normal symmetrical movements   5a. Motor Arm, Left 4-->No movement   5b. Motor Arm, Right 4-->No movement   6a. Motor Leg, Left 4-->No movement   6b. Motor Leg, Right 4-->No movement   7. Limb Ataxia 0-->Absent   8. Sensory 0-->Normal, no sensory loss   9. Best Language 3-->Mute, global aphasia, no usable speech or auditory comprehension   10. Dysarthria (UN) Intubated or other physical barrier   11. Extinction and Inattention (formerly Neglect) 0-->No abnormality   Total (NIH Stroke Scale) 26                  VTE Risk Mitigation (From admission, onward)           Ordered     IP VTE HIGH RISK PATIENT  Once         02/04/25 1407     Reason for No Pharmacological VTE Prophylaxis  Once        Comments: Traumatic injuries   Question:  Reasons:  Answer:   Active Bleeding  Comment:  SDH    02/04/25 1407     Place sequential compression device  Until discontinued         02/04/25 1407                    Thank you for your consult.     Carol Ann Snyder NP  Neurology  [facility] - [address] [place] [address]

## 2025-02-04 NOTE — TRANSFER OF CARE
Anesthesia Transfer of Care Note    Patient: Ezequiel Ramires    Procedure(s) Performed: Procedure(s) (LRB):  CRANIOTOMY, FOR SUBDURAL HEMATOMA EVACUATION (Right)    Patient location: ICU    Anesthesia Type: general    Transport from OR: Continuos invasive BP monitoring in transport. Continuous SpO2 monitoring in transport. Continuous ECG monitoring in transport. Transported from OR intubated on 100% O2 by AMBU with adequate controlled ventilation    Post pain: adequate analgesia    Post assessment: no apparent anesthetic complications and tolerated procedure well    Post vital signs: stable    Level of consciousness: sedated    Nausea/Vomiting: no nausea/vomiting    Complications: none    Transfer of care protocol was followed    Last vitals: Visit Vitals  /75 (BP Location: Left arm, Patient Position: Lying)   Pulse 81   Temp (!) 38.7 °C (101.7 °F) (Axillary)   Resp (!) 25   Ht 6' (1.829 m)   Wt 80 kg (176 lb 5.9 oz)   SpO2 100%   BMI 23.92 kg/m²

## 2025-02-04 NOTE — H&P
Trauma ICU   History and Physical Note    Patient Name: Ezequiel Ramires  YOB: 1960  Date: 02/04/2025 2:12 PM  Date of Admission: 2/4/2025  HD#0  POD#Day of Surgery    PRESENTING HISTORY   Chief Complaint/Reason for Admission: Subdural Hemorrhage      History of Present Illness:  Patient is a 64 year old  male with PMH of CAD, HTN, DM, HLD, alcoholism, arthritis, anxiety, MI who presents as a transfer from [facility] with bilateral SDH with 0.8 right to left midline shift, SAH, left occipital fracture, right 4th rib fracture. Report per wife patient was last seen normal around 1930 yesterday, she woke this morning to find him on the ground next to the bed with vomit on the floor, he was taken to the OSH at around 0500    Review of Systems:  12 point ROS negative except as stated in HPI    PAST HISTORY:   Past medical history:  CAD, DM, HTN, HLD, alcoholism, anxiety, arthritis, MI    Past surgical history:  Right knee replacement 11/24    Family history:  No family history on file.    Social history: 3-4 drinks/day     Social History     Tobacco Use   Smoking Status Not on file   Smokeless Tobacco Not on file      Social History     Substance and Sexual Activity   Alcohol Use Not on file        MEDICATIONS & ALLERGIES:   Allergies: Review of patient's allergies indicates:  No Known Allergies  Home Meds: No current outpatient medications   No current facility-administered medications on file prior to encounter.     No current outpatient medications on file prior to encounter.      No current facility-administered medications on file prior to encounter.     No current outpatient medications on file prior to encounter.     Scheduled Meds:   acetaminophen  649.6 mg Per OG tube Q4H    docusate sodium  100 mg Oral BID    famotidine (PF)  20 mg Intravenous BID    [START ON 2/5/2025] folic acid  1 mg Oral Daily    levETIRAcetam (Keppra) IV (PEDS and ADULTS)  500 mg Intravenous Q12H    methocarbamoL  500 mg Oral Q8H     "[START ON 2/5/2025] multivitamin  1 tablet Oral Daily    mupirocin   Nasal BID    polyethylene glycol  17 g Oral BID    [START ON 2/5/2025] thiamine  100 mg Oral Daily     Continuous Infusions:   0.9% NaCl   Intravenous Continuous         PRN Meds:  Current Facility-Administered Medications:     bisacodyL, 10 mg, Rectal, Daily PRN    ceFAZolin, , , PRN    dextrose 50%, 12.5 g, Intravenous, PRN    glucagon (human recombinant), 1 mg, Intramuscular, PRN    insulin aspart U-100, 0-5 Units, Subcutaneous, Q6H PRN    LIDOcaine-EPINEPHrine 0.5%-1:200,000, , , PRN    LORazepam, 2 mg, Oral, Q4H PRN    melatonin, 6 mg, Oral, Nightly PRN    morphine, 2 mg, Intravenous, Q2H PRN    oxyCODONE, 5 mg, Oral, Q4H PRN    OBJECTIVE:   Vital Signs:  VITAL SIGNS: 24 HR MIN & MAX LAST   Temp  Min: 98.4 °F (36.9 °C)  Max: 101.7 °F (38.7 °C)  (!) 101.7 °F (38.7 °C)   BP  Min: 148/93  Max: 148/93  (!) 148/93    Pulse  Min: 117  Max: 120  (!) 117    Resp  Min: 24  Max: 26  (!) 26    SpO2  Min: 96 %  Max: 99 %  96 %      HT: 6' (182.9 cm)  WT: 80 kg (176 lb 5.9 oz)  BMI: 23.9     Lines/drains/airway:       Peripheral IV - Single Lumen 02/04/25 1322 18 G Left Antecubital (Active)   Number of days: 0       Physical Exam:  General:  Well developed, well nourished, no acute distress  HEENT:  Normocephalic, atraumatic, Pupils equal, sluggish  CV:  RR  Resp: ventilator  GI:  Abdomen soft, non-tender, non-distended  :  Deferred  MSK:  No muscle atrophy, cyanosis, peripheral edema,  Skin/Wounds:  No rashes, ulcers, erythema  Neuro:  sedated  Labs:  Troponin:  No results for input(s): "TROPONINI" in the last 72 hours.  CBC:  Recent Labs     02/04/25  1333   WBC 12.17*   RBC 4.75   HGB 14.3   HCT 40.3*      MCV 84.8   MCH 30.1   MCHC 35.5     CMP:  Recent Labs     02/04/25  1333   CALCIUM 8.9      K 3.5   CO2 21*      BUN 22.5   CREATININE 1.12     Lactic Acid:  Recent Labs     02/04/25  0857 02/04/25  1424   LACTATE 2.7* 1.8 " "    ETOH:  No results for input(s): "ETHANOL" in the last 72 hours.   Urine Drug Screen:  No results for input(s): "COCAINE", "OPIATE", "BARBITURATE", "AMPHETAMINE", "FENTANYL", "CANNABINOIDS", "MDMA" in the last 72 hours.    Invalid input(s): "BENZODIAZEPINE", "PHENCYCLIDINE"   ABG:  No results for input(s): "PH", "PCO2", "PO2", "HCO3", "BE", "POCSATURATED" in the last 72 hours.    Diagnostic Results:  CT Head Without Contrast   Final Result      1. Stable appearance of the bifrontal hemorrhagic contusions, left cerebral intraparenchymal hemorrhage, predominantly right frontal subdural hematoma and extensive multifocal subarachnoid blood products with trace intraventricular blood products.  There has been no new hemorrhage.  The 8 mm of right to left midline shift is stable from the prior study.   2. Unchanged nondisplaced left occipital bone fracture with extension into foramen magnum.         Electronically signed by: Jc Farooq MD   Date:    02/04/2025   Time:    14:04      CT Previous C-Spine   Final Result      CT Previous Head   Final Result      CT Previous Neck   Final Result      X-Ray Chest AP Portable   Final Result      Suspect some mild left basilar atelectasis.         Electronically signed by: Alex Abdi   Date:    02/04/2025   Time:    13:05      CT Previous Chest Abdomen Pelvis; CT   Final Result      CT Previous Head; CT   Final Result      CT Previous Head; CTA   Final Result          ASSESSMENT & PLAN:    64 year old male who fell out of bed with left occipital fracture, bilateral SDH, SAH, IPH    Consults:  Consults: Neurosurgery     Neuro/psych:  - GCS 4(E 1, V T, M 3)   - Multimodal pain control   - C-Collar No     Pulmonary:  - IS, pulmonary toilet     Cardiovascular:  - Cardiac monitoring while in the ICU     Renal:  - Strict I&Os     FEN/GI:  - IVF: Normal Saline @ 125cc/hr  - Diet: NPO  - Daily CMP  - Replace electrolytes as needed based on daily labs     Heme/ID:  - Daily CBC  - " Antibiotics not indicated at this time.    Endocrine:  - BG <180     Musculoskeletal:  - PT/OT when able to participate  - WB status:   RUE: WBAT  LUE: WBAT  RLE: WBAT  LLE: WBAT  - Tertiary when appropriate      Wounds:  - Local wound care     Precautions:  Precautions: Fall and Seizure    Prophylaxis:  GI: H2B  Seizure: Keppra (day 7/7)  DVT: Hold lovenox SCD for DVT prophylaxis     LDA:  Peripheral IV    SDH, SAH, IPH  Left occipital bone fracture  - Neurosurgery consulted  - repeat CT head  - OR with NSGY for crani  - Keppra  - BP <140/90    Right 4th rib fracture  - MMPC  - IS when able  - Good pulmonary hygiene    Catarina Grimaldo FNP  Trauma Surgery

## 2025-02-04 NOTE — ASSESSMENT & PLAN NOTE
IPH -   CTH Impression:  1. Stable appearance of the bifrontal hemorrhagic contusions, left cerebral intraparenchymal hemorrhage, predominantly right frontal subdural hematoma and extensive multifocal subarachnoid blood products with trace intraventricular blood products.  There has been no new hemorrhage.  The 8 mm of right to left midline shift is stable from the prior study.  2. Unchanged nondisplaced left occipital bone fracture with extension into foramen magnum.    Plan:   -hourly neuro checks ... notify neurology of any neuro change  -strict blood pressure control ... -150 for the first 24 hours, then SBP less than 140  -avoid antiplatelet or anticoagulation at this time  -seizure precautions ... notify neurology of any seizure-like activity  -therapy evaluations   -no indication for Cerebral angiogram at this time   -continue care with primary and neurosurgery team post 2/4 craniotomy for subdural hematoma evacuation on right.   - Other recommendations may follow from MD        Current NIH Stroke Score Values      Flowsheet Row Most Recent Value   1a. Level of Consciousness 3-->Responds only with reflex motor or autonomic effects or totally unresponsive, flaccid, and areflexic   1b. LOC Questions 2-->Answers neither question correctly   1c. LOC Commands 2-->Performs neither task correctly   2. Best Gaze 0-->Normal   3. Visual 0-->No visual loss   4. Facial Palsy 0-->Normal symmetrical movements   5a. Motor Arm, Left 4-->No movement   5b. Motor Arm, Right 4-->No movement   6a. Motor Leg, Left 4-->No movement   6b. Motor Leg, Right 4-->No movement   7. Limb Ataxia 0-->Absent   8. Sensory 0-->Normal, no sensory loss   9. Best Language 3-->Mute, global aphasia, no usable speech or auditory comprehension   10. Dysarthria (UN) Intubated or other physical barrier   11. Extinction and Inattention (formerly Neglect) 0-->No abnormality   Total (NIH Stroke Scale) 26

## 2025-02-04 NOTE — HPI
Ezequiel Ramires is a 64 y.o. male presents as a transfer patient from [facility] for traumatic ICH.  Patient was found down by wife this morning underneath the table next to the bed with vomit is present they arrived at the outside facility approximately 5:00 a.m. secondary to low GCS patient was immediately intubated for airway protection.  Continued workup reveals subdural hematoma with shift, intraventricular blood, multiple cerebral contusions and bilateral cerebral subarachnoid hemorrhage along with the skull fracture and rib fracture.  [facility] consulted the neurosurgeon who is concerned about the possibility of an aneurysm.  CTA of head was performed which did not reveal aneurysm.  Neurosurgery was re-contacted and requested a 4 vessel cerebral angiogram which the [facility] was not able to perform.  Transferring ED physician stated that the patient was taken off propofol and remained comatose in the ED but did have a cough and gag reflex.  Patient remains off of sedation in [facility] ED. Interventional Neurology was consulted by Neurosurgery to determine if cerebral angiogram was in order at this time.  No indication for cerebral angiogram indicated at this time and patient was taken today for craniotomy for subdural hematoma evacuation on right.

## 2025-02-04 NOTE — Clinical Note
Diagnosis: Subdural hematoma [898530]   Admit to which facility:: [facility] [[id number]]   Reason for IP Medical Treatment  (Clinical interventions that can only be accomplished in the IP setting? ) :: SDH

## 2025-02-04 NOTE — CONSULTS
[facility] - Emergency Dept  Neurosurgery  Consult Note    Inpatient consult to Neurosurgery  Consult performed by: Clarisa Hernandez PA  Consult ordered by: Darrion Rich MD        Subjective:     Chief Complaint/Reason for Admission: SDH, IPH    History of Present Illness: This patient presents as a transfer from [facility] for traumatic ICH. Patient was found down by wife this morning, underneath the table next to the bed with vomitus present. Arrived at the [facility] a proximally 5:00 a.m.. Secondary to low GCS, patient was immediately intubated for airway protection. Workup revealed subdural hematoma with shift, intraventricular blood, multiple cerebral contusions and bilateral subarachnoid hemorrhage along with a skull fracture, and rib fracture. [facility] consulted their neurosurgeon, who was concerned about possibility of an aneurysm. A CTA of the head was performed which would did not reveal an aneurysm. Neurosurgeon re-contacted, and requested a 4 vessel cerebral angiogram which the [facility] was not able to perform. Thus the patient was transferred. Transferring ED physician states that the patient was taken off of propofol, and remained comatose in their ED but did have a cough and gag reflex. Dr. Jara was consulted for evaluation and treatment recommendations.    On PE in the ED he is intubated, propofol held for 30 min. He is decerebrate in bilateral UE. Triple flexion vs withdrawal in right LE. No response to pain in left LE. Pupils 5mm bilateral, reactive. Downward gaze present. +cough, corneal and gag.    Review of patient's allergies indicates:  No Known Allergies    No past medical history on file.  No past surgical history on file.  Family History    None       Tobacco Use    Smoking status: Not on file    Smokeless tobacco: Not on file   Substance and Sexual Activity    Alcohol use: Not on file    Drug use: Not on file    Sexual activity:  "Not on file     Review of Systems  Objective:   Unable to obtain d/t patient condition    Weight: 80 kg (176 lb 5.9 oz)  Body mass index is 23.92 kg/m².  Vital Signs (Most Recent):  Temp: (!) 101.7 °F (38.7 °C) (02/04/25 1253)  Pulse: (!) 117 (02/04/25 1240)  Resp: (!) 26 (02/04/25 1240)  BP: (!) 148/93 (02/04/25 1234)  SpO2: 96 % (02/04/25 1240) Vital Signs (24h Range):  Temp:  [98.4 °F (36.9 °C)-101.7 °F (38.7 °C)] 101.7 °F (38.7 °C)  Pulse:  [117-120] 117  Resp:  [24-26] 26  SpO2:  [96 %-99 %] 96 %  BP: (148)/(93) 148/93                Vent Mode: A/C  Oxygen Concentration (%):  [40-50] 50  Resp Rate Total:  [26 br/min] 26 br/min  Vt Set:  [500 mL] 500 mL  PEEP/CPAP:  [5 cmH20] 5 cmH20  Mean Airway Pressure:  [10 cmH20] 10 cmH20             Neurosurgery Physical Exam  AFVSS  Pupils 5mm bilateral, sluggish  Downward gaze preference  He does not open his eyes to noxious stimuli.  Decerebrate posturing bilateral UE.  Withdrawal vs triple flexion right LE  Flaccid left LE  +cough, corneal and gag    Significant Labs:  Recent Labs   Lab 02/04/25  0857      K 4.6   CL 96*   CO2 22   BUN 19   CREATININE 0.95   CALCIUM 8.7*     No results for input(s): "WBC", "HGB", "HCT", "PLT" in the last 48 hours.  Recent Labs   Lab 02/04/25  0527 02/04/25  0636   INR 1.1 1     Microbiology Results (last 7 days)       ** No results found for the last 168 hours. **          Significant Diagnostics:      Assessment/Plan:     Patient currently with GCS 4T; propofol held for 30 min  CT from outside hospital unable to be viewed at this time  We have ordered STAT repeat CT head now  Further recs to follow per Dr. Garcia once CT available  Continue to hold sedation as tolerated for better neuro assessment  Continue BP parameters below 140/90  Keppra BID  Will need to reach family for GOC discussion/surgical decision making    Thank you for your consult. I will follow-up with patient. Please contact us if you have any additional " questions.    RICH Mcgrath  Neurosurgery  [facility] - Emergency Dept

## 2025-02-05 PROBLEM — I61.9 INTRAPARENCHYMAL HEMORRHAGE OF BRAIN: Status: ACTIVE | Noted: 2025-02-05

## 2025-02-05 PROBLEM — I61.5 IVH (INTRAVENTRICULAR HEMORRHAGE): Status: ACTIVE | Noted: 2025-02-05

## 2025-02-05 LAB
ALBUMIN SERPL-MCNC: 3.4 G/DL (ref 3.4–4.8)
ALBUMIN/GLOB SERPL: 1.4 RATIO (ref 1.1–2)
ALP SERPL-CCNC: 58 UNIT/L (ref 40–150)
ALT SERPL-CCNC: 37 UNIT/L (ref 0–55)
ANION GAP SERPL CALC-SCNC: 11 MEQ/L
AST SERPL-CCNC: 83 UNIT/L (ref 5–34)
BASOPHILS # BLD AUTO: 0.02 X10(3)/MCL
BASOPHILS NFR BLD AUTO: 0.2 %
BILIRUB SERPL-MCNC: 0.7 MG/DL
BUN SERPL-MCNC: 18.4 MG/DL (ref 8.4–25.7)
CALCIUM SERPL-MCNC: 8.3 MG/DL (ref 8.8–10)
CHLORIDE SERPL-SCNC: 107 MMOL/L (ref 98–107)
CO2 SERPL-SCNC: 23 MMOL/L (ref 23–31)
CREAT SERPL-MCNC: 0.86 MG/DL (ref 0.72–1.25)
CREAT/UREA NIT SERPL: 21
EOSINOPHIL # BLD AUTO: 0 X10(3)/MCL (ref 0–0.9)
EOSINOPHIL NFR BLD AUTO: 0 %
ERYTHROCYTE [DISTWIDTH] IN BLOOD BY AUTOMATED COUNT: 14.4 % (ref 11.5–17)
GFR SERPLBLD CREATININE-BSD FMLA CKD-EPI: >60 ML/MIN/1.73/M2
GLOBULIN SER-MCNC: 2.5 GM/DL (ref 2.4–3.5)
GLUCOSE SERPL-MCNC: 247 MG/DL (ref 82–115)
HCT VFR BLD AUTO: 36 % (ref 42–52)
HGB BLD-MCNC: 12 G/DL (ref 14–18)
IMM GRANULOCYTES # BLD AUTO: 0.03 X10(3)/MCL (ref 0–0.04)
IMM GRANULOCYTES NFR BLD AUTO: 0.3 %
LYMPHOCYTES # BLD AUTO: 0.96 X10(3)/MCL (ref 0.6–4.6)
LYMPHOCYTES NFR BLD AUTO: 9.1 %
MCH RBC QN AUTO: 29.5 PG (ref 27–31)
MCHC RBC AUTO-ENTMCNC: 33.3 G/DL (ref 33–36)
MCV RBC AUTO: 88.5 FL (ref 80–94)
MONOCYTES # BLD AUTO: 1.05 X10(3)/MCL (ref 0.1–1.3)
MONOCYTES NFR BLD AUTO: 9.9 %
NEUTROPHILS # BLD AUTO: 8.51 X10(3)/MCL (ref 2.1–9.2)
NEUTROPHILS NFR BLD AUTO: 80.5 %
NRBC BLD AUTO-RTO: 0 %
PLATELET # BLD AUTO: 208 X10(3)/MCL (ref 130–400)
PMV BLD AUTO: 10.4 FL (ref 7.4–10.4)
POCT GLUCOSE: 252 MG/DL (ref 70–110)
POCT GLUCOSE: 265 MG/DL (ref 70–110)
POTASSIUM SERPL-SCNC: 3.3 MMOL/L (ref 3.5–5.1)
PROT SERPL-MCNC: 5.9 GM/DL (ref 5.8–7.6)
RBC # BLD AUTO: 4.07 X10(6)/MCL (ref 4.7–6.1)
SODIUM SERPL-SCNC: 141 MMOL/L (ref 136–145)
SODIUM SERPL-SCNC: 141 MMOL/L (ref 136–145)
SODIUM SERPL-SCNC: 144 MMOL/L (ref 136–145)
WBC # BLD AUTO: 10.57 X10(3)/MCL (ref 4.5–11.5)

## 2025-02-05 PROCEDURE — 27200966 HC CLOSED SUCTION SYSTEM

## 2025-02-05 PROCEDURE — 99900035 HC TECH TIME PER 15 MIN (STAT)

## 2025-02-05 PROCEDURE — 36569 INSJ PICC 5 YR+ W/O IMAGING: CPT

## 2025-02-05 PROCEDURE — 94761 N-INVAS EAR/PLS OXIMETRY MLT: CPT

## 2025-02-05 PROCEDURE — 25000003 PHARM REV CODE 250: Performed by: PHYSICIAN ASSISTANT

## 2025-02-05 PROCEDURE — 63600175 PHARM REV CODE 636 W HCPCS

## 2025-02-05 PROCEDURE — 99900026 HC AIRWAY MAINTENANCE (STAT)

## 2025-02-05 PROCEDURE — 27100171 HC OXYGEN HIGH FLOW UP TO 24 HOURS

## 2025-02-05 PROCEDURE — 63600175 PHARM REV CODE 636 W HCPCS: Performed by: NURSE PRACTITIONER

## 2025-02-05 PROCEDURE — 63600175 PHARM REV CODE 636 W HCPCS: Performed by: PHYSICIAN ASSISTANT

## 2025-02-05 PROCEDURE — 20800000 HC ICU TRAUMA

## 2025-02-05 PROCEDURE — 99291 CRITICAL CARE FIRST HOUR: CPT | Mod: ,,, | Performed by: SURGERY

## 2025-02-05 PROCEDURE — 99900031 HC PATIENT EDUCATION (STAT)

## 2025-02-05 PROCEDURE — 80053 COMPREHEN METABOLIC PANEL: CPT

## 2025-02-05 PROCEDURE — 85025 COMPLETE CBC W/AUTO DIFF WBC: CPT

## 2025-02-05 PROCEDURE — 36415 COLL VENOUS BLD VENIPUNCTURE: CPT | Performed by: SURGERY

## 2025-02-05 PROCEDURE — 94003 VENT MGMT INPAT SUBQ DAY: CPT

## 2025-02-05 PROCEDURE — 20000000 HC ICU ROOM

## 2025-02-05 PROCEDURE — 36415 COLL VENOUS BLD VENIPUNCTURE: CPT

## 2025-02-05 PROCEDURE — 84295 ASSAY OF SERUM SODIUM: CPT | Performed by: SURGERY

## 2025-02-05 PROCEDURE — 94760 N-INVAS EAR/PLS OXIMETRY 1: CPT

## 2025-02-05 PROCEDURE — C1751 CATH, INF, PER/CENT/MIDLINE: HCPCS

## 2025-02-05 PROCEDURE — 02HV33Z INSERTION OF INFUSION DEVICE INTO SUPERIOR VENA CAVA, PERCUTANEOUS APPROACH: ICD-10-PCS | Performed by: SURGERY

## 2025-02-05 PROCEDURE — 25000003 PHARM REV CODE 250

## 2025-02-05 RX ORDER — 3% SODIUM CHLORIDE 3 G/100ML
40 INJECTION, SOLUTION INTRAVENOUS CONTINUOUS
Status: DISCONTINUED | OUTPATIENT
Start: 2025-02-05 | End: 2025-02-05

## 2025-02-05 RX ORDER — LOSARTAN POTASSIUM 100 MG/1
1 TABLET ORAL NIGHTLY
Status: ON HOLD | COMMUNITY
Start: 2024-12-09 | End: 2025-02-21 | Stop reason: HOSPADM

## 2025-02-05 RX ORDER — ICOSAPENT ETHYL 1 G/1
2000 CAPSULE ORAL NIGHTLY
COMMUNITY
Start: 2024-11-18

## 2025-02-05 RX ORDER — ROSUVASTATIN CALCIUM 10 MG/1
1 TABLET, COATED ORAL NIGHTLY
COMMUNITY
Start: 2024-09-06

## 2025-02-05 RX ORDER — POTASSIUM CHLORIDE 14.9 MG/ML
20 INJECTION INTRAVENOUS
Status: COMPLETED | OUTPATIENT
Start: 2025-02-05 | End: 2025-02-05

## 2025-02-05 RX ORDER — AMLODIPINE BESYLATE 5 MG/1
1 TABLET ORAL DAILY
Status: ON HOLD | COMMUNITY
Start: 2024-11-12 | End: 2025-02-21 | Stop reason: HOSPADM

## 2025-02-05 RX ORDER — ASPIRIN 81 MG/1
1 TABLET ORAL EVERY MORNING
Status: ON HOLD | COMMUNITY
End: 2025-02-21 | Stop reason: HOSPADM

## 2025-02-05 RX ORDER — METOPROLOL TARTRATE 50 MG/1
1 TABLET ORAL 2 TIMES DAILY
Status: ON HOLD | COMMUNITY
Start: 2024-11-11 | End: 2025-02-21 | Stop reason: HOSPADM

## 2025-02-05 RX ORDER — EZETIMIBE 10 MG/1
1 TABLET ORAL DAILY
COMMUNITY
Start: 2025-01-10

## 2025-02-05 RX ORDER — SODIUM CHLORIDE 0.9 % (FLUSH) 0.9 %
10 SYRINGE (ML) INJECTION EVERY 12 HOURS PRN
Status: DISCONTINUED | OUTPATIENT
Start: 2025-02-05 | End: 2025-02-12

## 2025-02-05 RX ORDER — 3% SODIUM CHLORIDE 3 G/100ML
50 INJECTION, SOLUTION INTRAVENOUS CONTINUOUS
Status: DISCONTINUED | OUTPATIENT
Start: 2025-02-05 | End: 2025-02-05

## 2025-02-05 RX ORDER — LORAZEPAM 1 MG/1
2 TABLET ORAL EVERY 4 HOURS PRN
Status: DISCONTINUED | OUTPATIENT
Start: 2025-02-05 | End: 2025-02-12

## 2025-02-05 RX ADMIN — LEVETIRACETAM 500 MG: 100 INJECTION, SOLUTION INTRAVENOUS at 09:02

## 2025-02-05 RX ADMIN — ACETAMINOPHEN 650 MG: 650 SOLUTION ORAL at 06:02

## 2025-02-05 RX ADMIN — MUPIROCIN: 20 OINTMENT TOPICAL at 08:02

## 2025-02-05 RX ADMIN — METHOCARBAMOL 500 MG: 500 TABLET ORAL at 06:02

## 2025-02-05 RX ADMIN — LEVETIRACETAM 500 MG: 100 INJECTION, SOLUTION INTRAVENOUS at 08:02

## 2025-02-05 RX ADMIN — LABETALOL HYDROCHLORIDE 20 MG: 5 INJECTION, SOLUTION INTRAVENOUS at 09:02

## 2025-02-05 RX ADMIN — FAMOTIDINE 20 MG: 10 INJECTION, SOLUTION INTRAVENOUS at 09:02

## 2025-02-05 RX ADMIN — SODIUM CHLORIDE: 4 INJECTION, SOLUTION, CONCENTRATE INTRAVENOUS at 11:02

## 2025-02-05 RX ADMIN — ACETAMINOPHEN 650 MG: 650 SOLUTION ORAL at 08:02

## 2025-02-05 RX ADMIN — FOLIC ACID 1 MG: 1 TABLET ORAL at 08:02

## 2025-02-05 RX ADMIN — POLYETHYLENE GLYCOL 3350 17 G: 17 POWDER, FOR SOLUTION ORAL at 09:02

## 2025-02-05 RX ADMIN — FAMOTIDINE 20 MG: 10 INJECTION, SOLUTION INTRAVENOUS at 08:02

## 2025-02-05 RX ADMIN — HYDRALAZINE HYDROCHLORIDE 10 MG: 20 INJECTION INTRAMUSCULAR; INTRAVENOUS at 12:02

## 2025-02-05 RX ADMIN — INSULIN ASPART 3 UNITS: 100 INJECTION, SOLUTION INTRAVENOUS; SUBCUTANEOUS at 11:02

## 2025-02-05 RX ADMIN — INSULIN ASPART 1 UNITS: 100 INJECTION, SOLUTION INTRAVENOUS; SUBCUTANEOUS at 06:02

## 2025-02-05 RX ADMIN — DOCUSATE SODIUM 100 MG: 100 CAPSULE, LIQUID FILLED ORAL at 09:02

## 2025-02-05 RX ADMIN — ACETAMINOPHEN 650 MG: 650 SOLUTION ORAL at 02:02

## 2025-02-05 RX ADMIN — THIAMINE HCL TAB 100 MG 100 MG: 100 TAB at 08:02

## 2025-02-05 RX ADMIN — METHOCARBAMOL 500 MG: 500 TABLET ORAL at 02:02

## 2025-02-05 RX ADMIN — THERA TABS 1 TABLET: TAB at 08:02

## 2025-02-05 RX ADMIN — INSULIN ASPART 1 UNITS: 100 INJECTION, SOLUTION INTRAVENOUS; SUBCUTANEOUS at 12:02

## 2025-02-05 RX ADMIN — POTASSIUM CHLORIDE 20 MEQ: 14.9 INJECTION, SOLUTION INTRAVENOUS at 09:02

## 2025-02-05 RX ADMIN — POLYETHYLENE GLYCOL 3350 17 G: 17 POWDER, FOR SOLUTION ORAL at 08:02

## 2025-02-05 RX ADMIN — ACETAMINOPHEN 650 MG: 650 SOLUTION ORAL at 01:02

## 2025-02-05 RX ADMIN — DOCUSATE SODIUM 100 MG: 100 CAPSULE, LIQUID FILLED ORAL at 08:02

## 2025-02-05 RX ADMIN — METHOCARBAMOL 500 MG: 500 TABLET ORAL at 09:02

## 2025-02-05 RX ADMIN — MUPIROCIN: 20 OINTMENT TOPICAL at 09:02

## 2025-02-05 RX ADMIN — CEFAZOLIN 2 G: 2 INJECTION, POWDER, FOR SOLUTION INTRAMUSCULAR; INTRAVENOUS at 06:02

## 2025-02-05 RX ADMIN — INSULIN ASPART 3 UNITS: 100 INJECTION, SOLUTION INTRAVENOUS; SUBCUTANEOUS at 05:02

## 2025-02-05 RX ADMIN — POTASSIUM CHLORIDE 20 MEQ: 14.9 INJECTION, SOLUTION INTRAVENOUS at 10:02

## 2025-02-05 RX ADMIN — CEFAZOLIN 2 G: 2 INJECTION, POWDER, FOR SOLUTION INTRAMUSCULAR; INTRAVENOUS at 02:02

## 2025-02-05 NOTE — PLAN OF CARE
SDH post fall at home. Transfer from [facility] on 2/4 here  Lives at home with wife Reina   who is a teacher. Pt is retired. Reina will use [place] to care for pt as needed. Single level home with threshold to enter. Pt has dogs and cats at home that are important to him. Dogs are Jane and Rufino  [employer]  PCP Davy Staton  Used no equipment but they do have a walker at home.   Family has a strong preference for [facility] should locations for rehab be needed.

## 2025-02-05 NOTE — PLAN OF CARE
Problem: Adult Inpatient Plan of Care  Goal: Plan of Care Review  Outcome: Progressing  Goal: Patient-Specific Goal (Individualized)  Outcome: Progressing  Goal: Absence of Hospital-Acquired Illness or Injury  Outcome: Progressing  Goal: Optimal Comfort and Wellbeing  Outcome: Progressing  Goal: Readiness for Transition of Care  Outcome: Progressing     Problem: Infection  Goal: Absence of Infection Signs and Symptoms  Outcome: Progressing     Problem: Wound  Goal: Optimal Coping  Outcome: Progressing  Goal: Optimal Functional Ability  Outcome: Progressing  Goal: Absence of Infection Signs and Symptoms  Outcome: Progressing  Goal: Improved Oral Intake  Outcome: Progressing  Goal: Optimal Pain Control and Function  Outcome: Progressing  Goal: Skin Health and Integrity  Outcome: Progressing  Goal: Optimal Wound Healing  Outcome: Progressing     Problem: Fall Injury Risk  Goal: Absence of Fall and Fall-Related Injury  Outcome: Progressing     Problem: Skin Injury Risk Increased  Goal: Skin Health and Integrity  Outcome: Progressing     Problem: Delirium  Goal: Optimal Coping  Outcome: Progressing  Goal: Improved Behavioral Control  Outcome: Progressing  Goal: Improved Attention and Thought Clarity  Outcome: Progressing  Goal: Improved Sleep  Outcome: Progressing

## 2025-02-05 NOTE — PROCEDURES
"Ezequiel Ramires is a 64 y.o. male patient.    Temp: 99.2 °F (37.3 °C) (02/05/25 0800)  Pulse: 90 (02/05/25 1100)  Resp: 18 (02/05/25 1100)  BP: (!) 126/105 (02/05/25 1100)  SpO2: 97 % (02/05/25 1100)  Weight: 80 kg (176 lb 5.9 oz) (02/05/25 0400)  Height: 6' 0.01" (182.9 cm) (02/05/25 0931)    PICC  Date/Time: 2/5/2025 11:07 AM  Performed by: Jack Lane, RN  Consent Done: Yes  Time out: Immediately prior to procedure a time out was called to verify the correct patient, procedure, equipment, support staff and site/side marked as required  Indications: med administration and vascular access  Anesthesia: local infiltration  Local anesthetic: lidocaine 1% without epinephrine  Anesthetic Total (mL): 3  Preparation: skin prepped with ChloraPrep  Skin prep agent dried: skin prep agent completely dried prior to procedure  Sterile barriers: all five maximum sterile barriers used - cap, mask, sterile gown, sterile gloves, and large sterile sheet  Hand hygiene: hand hygiene performed prior to central venous catheter insertion  Location details: right basilic  Catheter type: triple lumen  Catheter size: 5 Fr  Catheter Length: 38cm    Ultrasound guidance: yes  , compressibility normal  Vascular Doppler: not done  Needle advanced into vessel with real time Ultrasound guidance.  Guidewire confirmed in vessel.  Sterile sheath used.  no esophageal manometryNumber of attempts: 1  Post-procedure: blood return through all ports, sterile dressing applied and chlorhexidine patch    Assessment: placement verified by x-ray  Complications: none          Jack Lane RN  2/5/2025    "

## 2025-02-05 NOTE — PROGRESS NOTES
POD#1 right craniectomy for SDH  He is currently intubated, mechanically ventilated  No sedation  Na 141    AFVSS  Pupils 3mm bilateral, sluggish  He does not open his eyes to sternal rub.  Decerebrate posturing in bilateral UE  Triple flexion to bilateral LE  Dressing c/d/I  Flap full but not tense  Drain output 20 since surgery    CT head this am:  Impression:       1. Postoperative changes following right-sided craniectomy, with decreased subdural hemorrhage and mass effect.  2. Otherwise similar parenchymal, subarachnoid and intraventricular hemorrhages.     Plan: Continue drain  Continue current dressing  We will start hypertonic saline at 50cc/hr with Na goal 150-155  Q6 Na checks  Continue Q1 hour neuro exams  Continue BP parameters below 140/90  Keppra BID  SCDs for DVT prophylaxis

## 2025-02-05 NOTE — PROGRESS NOTES
Inpatient Nutrition Assessment    Admit Date: 2/4/2025   Total duration of encounter: 1 day   Patient Age: 64 y.o.    Nutrition Recommendation/Prescription     Start tube feeding when appropriate.  Tube feeding recommendation:     Impact Peptide 1.5 goal rate 70 ml/hr to provide  2100 kcal/d  (97% est needs, 103% with meds)  131 g protein/d (100% est needs)  1078 ml free water/d (50% est needs)  (calculations based on estimated 20 hr/d run time)     If no IV fluids running, can give 100ml q 2hr water flushes. Total water provided: 2078ml (96% est needs.)     Start @ 20ml/hr, increase as tolerated 20ml/hr q4hr until goal rate reached.      Communication of Recommendations: reviewed with nurse    Nutrition Assessment     Malnutrition Assessment/Nutrition-Focused Physical Exam       Malnutrition Level: other (see comments) (Does not meet criteria) (02/05/25 0931)  Energy Intake (Malnutrition): other (see comments) (Unable to assess) (02/05/25 0931)  Weight Loss (Malnutrition): other (see comments) (Unable to assess) (02/05/25 0931)                                         Fluid Accumulation (Malnutrition): other (see comments) (Not present) (02/05/25 0931)        A minimum of two characteristics is recommended for diagnosis of either severe or non-severe malnutrition.    Chart Review    Reason Seen: continuous nutrition monitoring and physician consult for Tf    Malnutrition Screening Tool Results   Have you recently lost weight without trying?: No  Have you been eating poorly because of a decreased appetite?: No   MST Score: 0   Diagnosis:  SDH, SAH, IPH  Left occipital bone fracture  Right 4th rib fracture    Relevant Medical History:   CAD, DM, HTN, HLD, alcoholism, anxiety, arthritis, MI     Scheduled Medications:  acetaminophen, 650 mg, Q4H  ceFAZolin (Ancef) IV (PEDS and ADULTS), 2 g, Q8H  docusate sodium, 100 mg, BID  famotidine (PF), 20 mg, BID  folic acid, 1 mg, Daily  levETIRAcetam (Keppra) IV (PEDS and  ADULTS), 500 mg, Q12H  methocarbamoL, 500 mg, Q8H  multivitamin, 1 tablet, Daily  mupirocin, , BID  polyethylene glycol, 17 g, BID  potassium chloride in water, 20 mEq, Q2H  thiamine, 100 mg, Daily    Continuous Infusions:  0.9% NaCl  0.9% NaCl, Last Rate: 100 mL/hr at 02/05/25 0630  nicardipine, Last Rate: Stopped (02/05/25 0501)  propofoL, Last Rate: 10 mcg/kg/min (02/05/25 0630)  sodium chloride 3% HYPERTONIC    PRN Medications:  acetaminophen, 650 mg, Q4H PRN  acetaminophen, 650 mg, Q4H PRN  bisacodyL, 10 mg, Daily PRN  dextrose 50%, 12.5 g, PRN  enalaprilat, 1.25 mg, Q4H PRN  glucagon (human recombinant), 1 mg, PRN  hydrALAZINE, 10 mg, Q4H PRN  insulin aspart U-100, 0-5 Units, Q6H PRN  labetaloL, 20 mg, Q4H PRN  LORazepam, 2 mg, Q4H PRN  melatonin, 6 mg, Nightly PRN  morphine, 2 mg, Q2H PRN  ondansetron, 4 mg, Q6H PRN  oxyCODONE, 5 mg, Q4H PRN  prochlorperazine, 5 mg, Q6H PRN    Calorie Containing IV Medications: Diprivan @ 4.8 ml/hr (provides 125 kcal/d)    Recent Labs   Lab 02/04/25  0857 02/04/25  1333 02/05/25  0401    140 141   K 4.6 3.5 3.3*   CALCIUM 8.7* 8.9 8.3*   CL 96* 103 107   CO2 22 21* 23   BUN 19 22.5 18.4   CREATININE 0.95 1.12 0.86   EGFRNORACEVR  --  >60 >60   GLUCOSE  --  249* 247*   BILITOT  --   --  0.7   ALKPHOS  --   --  58   ALT  --   --  37   AST  --   --  83*   ALBUMIN  --   --  3.4   WBC  --  12.17* 10.57   HGB  --  14.3 12.0*   HCT  --  40.3* 36.0*     Nutrition Orders:  Diet NPO      Appetite/Oral Intake: not applicable/not applicable  Factors Affecting Nutritional Intake: on mechanical ventilation  Social Needs Impacting Access to Food: unable to assess at this time; will attempt on follow-up  Food/Anglican/Cultural Preferences: unable to obtain  Food Allergies: no known food allergies  Last Bowel Movement: 02/04/25  Wound(s):  Incision documentation noted     Comments    2/5/25: Discussed with RN. Will provide tube feeding recommendations for when appropriate to start tube  "feeding. Receiving kcal from meds.  Noted elevated Tmax, may need to update est needs upon F/U.     Anthropometrics    Height: 6' 0.01" (182.9 cm), Height Method: Estimated  Last Weight: 80 kg (176 lb 5.9 oz) (02/05/25 0400), Weight Method: Bed Scale  BMI (Calculated): 23.9  BMI Classification: normal (BMI 18.5-24.9)        Ideal Body Weight (IBW), Male: 178.06 lb     % Ideal Body Weight, Male (lb): 99.08 %                          Usual Weight Provided By: unable to obtain usual weight    Wt Readings from Last 5 Encounters:   02/05/25 80 kg (176 lb 5.9 oz)     Weight Change(s) Since Admission:   Wt Readings from Last 1 Encounters:   02/05/25 0400 80 kg (176 lb 5.9 oz)   02/04/25 1234 80 kg (176 lb 5.9 oz)   Admit Weight: 80 kg (176 lb 5.9 oz) (02/04/25 1234), Weight Method: Estimated    Estimated Needs    Weight Used For Calorie Calculations: 80 kg (176 lb 5.9 oz)  Energy Calorie Requirements (kcal): 2158kcal  Energy Need Method: [facility]  Weight Used For Protein Calculations: 80 kg (176 lb 5.9 oz)  Protein Requirements: 120-136gm (1.5-1.7g/kg)  Fluid Requirements (mL): 2158ml (1ml/kcal)  CHO Requirement: 245gm (45% est kcal needs)     Enteral Nutrition     Patient not receiving enteral nutrition at this time.    Parenteral Nutrition     Patient not receiving parenteral nutrition support at this time.    Evaluation of Received Nutrient Intake    Calories: not meeting estimated needs  Protein: not meeting estimated needs    Patient Education     Not applicable.    Nutrition Diagnosis     PES: Inadequate oral intake related to acute illness as evidenced by intubation since admit. (new)     PES:            Nutrition Interventions     Intervention(s): modified composition of enteral nutrition, modified rate of enteral nutrition, and collaboration with other providers  Intervention(s):      Goal: Meet greater than 80% of nutritional needs by follow-up. (new)  Goal: Tolerate enteral feeding at goal rate by follow-up. " (new)    Nutrition Goals & Monitoring     Dietitian will monitor: energy intake and enteral nutrition intake  Discharge planning: too early to determine; pending clinical course  Nutrition Risk/Follow-Up: high (follow-up in 1-4 days)   Please consult if re-assessment needed sooner.

## 2025-02-05 NOTE — ANESTHESIA POSTPROCEDURE EVALUATION
Anesthesia Post Evaluation    Patient: Ezequiel Ramires    Procedure(s) Performed: Procedure(s) (LRB):  CRANIOTOMY, FOR SUBDURAL HEMATOMA EVACUATION (Right)    Final Anesthesia Type: general      Patient location during evaluation: ICU  Patient participation: No - Unable to Participate, Sedation  Level of consciousness: sedated  Post-procedure vital signs: reviewed and stable    PONV status at discharge: No PONV  Anesthetic complications: no      Cardiovascular status: stable  Respiratory status: ventilator and intubated  Hydration status: euvolemic  Follow-up not needed.              Vitals Value Taken Time   /68 02/04/25 2001   Temp 35.9 °C (96.7 °F) 02/04/25 1700   Pulse 102 02/04/25 2056   Resp 25 02/04/25 2056   SpO2 96 % 02/04/25 2056   Vitals shown include unfiled device data.      No case tracking events are documented in the log.      Pain/Emily Score: Pain Rating Prior to Med Admin: 0 (2/4/2025  5:34 PM)

## 2025-02-05 NOTE — OP NOTE
[facility]                       [address] [address]    PATIENT NAME:      RICK MAHAN   YOB: 1960  CSN:               [id number]  MRN:               [id number]  ADMIT DATE:        02/04/2025 12:36:00  PHYSICIAN:         Samir Garcia MD                          OPERATIVE REPORT      DATE OF SURGERY:    02/04/2025 09:02:48    SURGEON:  Samir Garcia MD    ASSISTANT:  Gabino Escudero.    PREOPERATIVE DIAGNOSES:  Right acute subdural hematoma with a shift, brain   swelling with decreased level of consciousness and obtundation.    POSTOPERATIVE DIAGNOSES:  Right acute subdural hematoma with a shift, brain   swelling with decreased level of consciousness and obtundation.    PROCEDURE:  Right frontoparietal temporal craniectomy with evacuation of   subdural hematoma and hemostasis of the contused brain and subsequent closure   with DuraGen and closure of the skin without replacement of bone.    INDICATION FOR SURGERY:  A 64-year-old gentleman that came from [place],   apparently had subdural early this morning.  Had a CTA that was negative and   subsequently transferred here.  We proceeded after the patient got repeat CAT   scan and taken to the operating room for craniectomy for right subdural hematoma   and obtaining hemostasis.  Consent was obtained from the wife, who was very   distraught right before surgery.  The risks of bleeding, infection, poor   prognosis were discussed with her.  She wanted me to proceed with surgery.    OPERATIVE PROCEDURE:  The patient was already intubated.  Head was shaved,   sterilely prepped and draped.  We put a bump on the right side and then   subsequently once the head was prepped and draped, we made an incision zygomatic   going backward and forward and reflected everything very much forward.  We cut   that, cut the muscle, reflected that forward.  We put several bur  holes and a   large bone flap was removed.  We immediately opened the dura and subdural   started coming out.  We put dural tack-ups and took our time obtaining   hemostasis of the contused brain in different directions with suctioning   subdural frontally, inferior, posterior, medially and then we kept hemostasis in   the brain and by putting Surgicel and Avitene and FloSeal in different   directions.  We kept cleaning all that hemostasis sites and also brought the   microscope and looked inferiorly and obtained hemostasis.  Once that was done,   everything was nice and dry, we put DuraGen fibrin glue and closed the skin with   galea and closed the skin with 3-0 Vicryl and staples.  The drain was put in   and secured and posteriorly attached and the drain was working well.  There were   no issues, no complications from my standpoint.        ______________________________  MD BRIA Knapp/CHRIS  DD:  02/04/2025  Time:  04:26PM  DT:  02/04/2025  Time:  10:00PM  Job #:  [id number]      OPERATIVE REPORT

## 2025-02-05 NOTE — CONSULTS
[facility]                       [address] [address]    PATIENT NAME:       RICK MAHAN   YOB: 1960  CSN:                [id number]   MRN:                [id number]  ADMIT DATE:         02/04/2025 12:36:00  PHYSICIAN:          Samir Garcia MD                            CONSULTATION    DATE OF CONSULT:      He is a gentleman I am taking to the operating room.  He came over here a little   while ago from [place] due to subdural since 5 o'clock this morning.  He   subsequently was seen by neurosurgeon who did not take care of this patient and   was unavailable to take care of the patient for evacuation of subdural, not   taken in the OR.  His CTA was negative over there as well.  Repeat CTA shows the   subdural looks the same this morning; however, he is significantly obtunded and   difficult to arouse despite when the sedation comes down.  At this time, we are   doing an emergency craniectomy/craniotomy to evacuate subdural hematoma.    Consent was obtained from the wife while he was still in the operating room.    I also discussed the poor prognosis consisting of what his condition is and the   time spent from the time it happened and she is grateful I am taking care of   him.        ______________________________  Samir Garcia MD    IM/AQS  DD:  02/04/2025  Time:  03:03PM  DT:  02/04/2025  Time:  07:00PM  Job #:  [id number]      CONSULTATION

## 2025-02-05 NOTE — LOPA/MORA/SWTA/AOC/AEB
[facility] ([facility])  Notification of Referral  [facility] Contact # [phone]        Thank you for the referral of this patient to determine suitability for organ, tissue, and eye donation.  A chart review has been conducted (date):2025 at (time) 05:43 AM.    ? Potential candidate for organ donation - SHALONDA following patient. Any changes in patients condition, discussion of withdrawing the vent or brain death exams, or family mention of donation immediately call [phone]. Refer all organ referrals within 1 hour of meeting the clinical triger of a patient with a neurological, anoxic, or life threatening injury and ONE of the following:    * GCS </= 8    * Loss of 2 or more brain stem reflexes    * Hypothermic Protocol Initiated    * Withdrawal of support discussion regardless of GCS    * Family mention of Donation    ? Potential for candidate for tissue and eye donation- call SHALONDA at [phone] within 2 hours of death for screening as a potential tissue and/or eye donor.      ? Potential candidate for eye donation - call SHALONDA at [phone] within 2 hours of death for screening as a potential eye donor.    ? NOT a candidate for organ/tissue/eye donation- call SHALONDA at [phone] within 2 hours of death to report the time of death.    ? Potential candidate for donation/ Referral Closed- Any changes in patients condition, GCS of 5 or less, discussion of withdrawing the vent, brain death exams, or family mention of donation immediately call [phone].      Screened by: Sarah Montes    [facility] Referral Number:  [id number]     Suitable for:  [x]Organ  [x] Tissue  [x] Eye  []Not suitable for Donation    Rule out Reason: N/A    Patient Name: Ezequiel Ramires                   64 y.o. male  Patient MRN: [id number]  : 1960  DOD:  TOD:  Cause of Death: N/A    [x]Vented Patient  [facility] representative to approach family if appropriate  []DNR status obtained Referral of critical care  patients when family initiates Do Not Resuscitate  []Cardiac Death   Clinical Support Center to approach family via telephone if appropriate  [x]Donor Registry  Patient is listed in the Donor Registry    Completed by: Madie Gimenez

## 2025-02-05 NOTE — PROGRESS NOTES
TERTIARY TRAUMA SURVEY (TTS)    List Injuries Identified to Date:   1. Acute nondisplaced R 4th rib fracture  2. Tree-in-bud nodularities in the bilateral lower lobes which could be due to infectious bronchiolitis or aspiration   3. 4 mm anterolisthesis of C4 on C5 due to degeneration versus trauma  4. Nondisplaced left occipital bone fracture extending inferiorly to the level of the foramen magnum   5. Bilateral SDH  6. Bilateral IPH  7. Bilateral SAH  8. Small layering hemorrhage in the left occipital horn   9. 8 mm right to left midline shift    [x]Problems list reviewed  List Operations and Procedures:   1. Right frontoparietal temporal craniectomy with evacuation of SDH 2/4/25    Past Surgical History:   Procedure Laterality Date    CRANIOTOMY FOR EVACUATION OF SUBDURAL HEMATOMA Right 2/4/2025    Procedure: CRANIOTOMY, FOR SUBDURAL HEMATOMA EVACUATION;  Surgeon: Samir Garcia MD;  Location: [facility];  Service: Neurosurgery;  Laterality: Right;       Incidental findings:   1. Diverticulosis without inflammatory changes  2. Right middle lobe and right lower lobe pulmonary nodules, largest in the right lung base measuring up to 7 mm   3. Irregular calcification in the right hepatic lobe measuring up to 2.5 cm. This is probably sequela prior granulomatous disease     Past Medical History:   1. HTN  2. HLD  3. DMII  4. Psoriasis  5. CAD s/p stents  6. MI    Active Ambulatory Problems     Diagnosis Date Noted    No Active Ambulatory Problems     Resolved Ambulatory Problems     Diagnosis Date Noted    No Resolved Ambulatory Problems     Past Medical History:   Diagnosis Date    CAD (coronary artery disease)     DMII (diabetes mellitus, type 2)     HLD (hyperlipidemia)     HTN (hypertension)     Psoriasis     ST elevation (STEMI) myocardial infarction of unspecified site      Past Medical History:   Diagnosis Date    CAD (coronary artery disease)     DMII (diabetes mellitus, type 2)     HLD (hyperlipidemia)     HTN  (hypertension)     Psoriasis     ST elevation (STEMI) myocardial infarction of unspecified site        Tertiary Physical Exam:     Physical Exam  Vitals and nursing note reviewed.   Constitutional:       General: He is not in acute distress.     Appearance: Normal appearance.   HENT:      Right Ear: External ear normal.      Left Ear: External ear normal.      Nose: Nose normal.      Mouth/Throat:      Mouth: Mucous membranes are moist.      Pharynx: Oropharynx is clear.   Eyes:      Comments: + corneal reflex  + pupillary light reflex   Neck:      Comments: C-collar in place  Cardiovascular:      Rate and Rhythm: Normal rate.   Pulmonary:      Effort: No respiratory distress.      Comments: Intubated  Abdominal:      General: Abdomen is flat.      Palpations: Abdomen is soft.   Skin:     General: Skin is warm and dry.   Neurological:      Comments: GCS 4T   Psychiatric:      Comments: Unable to assess         Imaging Review:     Imaging Results              CT Head Without Contrast (Final result)  Result time 02/04/25 14:04:09      Final result by Jc Farooq MD (02/04/25 14:04:09)                   Impression:      1. Stable appearance of the bifrontal hemorrhagic contusions, left cerebral intraparenchymal hemorrhage, predominantly right frontal subdural hematoma and extensive multifocal subarachnoid blood products with trace intraventricular blood products.  There has been no new hemorrhage.  The 8 mm of right to left midline shift is stable from the prior study.  2. Unchanged nondisplaced left occipital bone fracture with extension into foramen magnum.      Electronically signed by: Jc Farooq MD  Date:    02/04/2025  Time:    14:04               Narrative:    EXAMINATION:  CT HEAD WITHOUT CONTRAST    CLINICAL HISTORY:  Subdural hemorrhage;    TECHNIQUE:  Axial images of the head were obtained without IV contrast administration.  Coronal and sagittal reconstructions were provided.  Three dimensional and MIP  images were obtained and evaluated.  Total DLP was 926 mGy-cm. Dose lowering technique and automated exposure control were utilized for this exam.    COMPARISON:  CT of the head 02/04/2025.    FINDINGS:  The inferior bilateral lower lobe hemorrhagic contusions are unchanged in size.  The intraparenchymal hematoma in the inferior cerebellar region is unchanged.  The small volume intraventricular blood products in the bilateral lateral ventricles is unchanged.  The subarachnoid blood products in the skull base, bilateral sylvian fissure, bilateral frontal lobes is unchanged.  The right predominantly frontal subdural hematoma is unchanged.  This measures 9 mm in maximum thickness.  The right to left midline shift measuring 8 mm is stable from the prior study.  The suprasellar cistern is patent.  There is no downward transtentorial herniation.    There is a nondisplaced fracture of the left occipital bone extending inferiorly into foramen magnum best visualized on series 4, image 5.  The bilateral orbits are normal.  There is mucoperiosteal thickening of the bilateral maxillary sinuses.                                       X-Ray Chest AP Portable (Final result)  Result time 02/04/25 13:05:36      Final result by Alex Abdi MD (02/04/25 13:05:36)                   Impression:      Suspect some mild left basilar atelectasis.      Electronically signed by: Alex Abdi  Date:    02/04/2025  Time:    13:05               Narrative:    EXAMINATION:  XR CHEST AP PORTABLE    CLINICAL HISTORY:  Transfer, intubated;    COMPARISON:  No priors    FINDINGS:  Frontal view of the chest was obtained. Endotracheal tube tip midthoracic trachea.  Enteric tube extends into the stomach.  The heart is not enlarged.  Suspect some mild left basilar atelectasis.  Lungs otherwise grossly clear.  No pneumothorax.                                       Lab Review:   CBC:  Recent Labs   Lab Result Units 02/04/25  1333 02/05/25  0401   WBC  "x10(3)/mcL 12.17* 10.57   RBC x10(6)/mcL 4.75 4.07*   Hgb g/dL 14.3 12.0*   Hct % 40.3* 36.0*   Platelet x10(3)/mcL 234 208   MCV fL 84.8 88.5   MCH pg 30.1 29.5   MCHC g/dL 35.5 33.3       CMP:  Recent Labs   Lab Result Units 02/05/25  0401   Calcium mg/dL 8.3*   Albumin g/dL 3.4   Sodium mmol/L 141   Potassium mmol/L 3.3*   CO2 mmol/L 23   Chloride mmol/L 107   Blood Urea Nitrogen mg/dL 18.4   Creatinine mg/dL 0.86   ALP unit/L 58   ALT unit/L 37   AST unit/L 83*   Bilirubin Total mg/dL 0.7       Troponin:  No results for input(s): "TROPONINI" in the last 2160 hours.    ETOH:  No results for input(s): "ETHANOL" in the last 72 hours.     Urine Drug Screen:  No results for input(s): "COCAINE", "OPIATE", "BARBITURATE", "AMPHETAMINE", "FENTANYL", "CANNABINOIDS", "MDMA" in the last 72 hours.    Invalid input(s): "BENZODIAZEPINE", "PHENCYCLIDINE"     Plan:   64 year old male who fell out of bed with left occipital fracture, bilateral SDH, SAH, IPH      Consults:  Consults: Neurosurgery     Neuro/psych:  - GCS 4(E 1, V T, M 3)   - Multimodal pain control   - C-Collar No     Pulmonary:  - Intubated   - IS, pulmonary toilet when able     Cardiovascular:  - Cardiac monitoring while in the ICU     Renal:  - Strict I&Os     FEN/GI:  - IVF: Normal Saline @ 125cc/hr  - Diet: NPO  - Daily CMP  - Replace electrolytes as needed based on daily labs  - Start tube feeds 2/5     Heme/ID:  - Daily CBC  - Antibiotics not indicated at this time.     Endocrine:  - BG <180     Musculoskeletal:  - PT/OT when able to participate  - WB status:   RUE: WBAT  LUE: WBAT  RLE: WBAT  LLE: WBAT       Wounds:  - Local wound care      Precautions:  Precautions: Fall and Seizure     Prophylaxis:  GI: H2B  Seizure: Keppra (day 2/7)  DVT: Hold lovenox SCD for DVT prophylaxis     LDA:  Peripheral IV     B/l SDH, SAH, IPH  IPH  - NSGY conult  - s/p craniectomy with SDH evacuation 2/4  - Decreased SDH/shift, stable SAH/IPH on repeat CTH 2/5  - Hold sedation " for serial neuro exams.  - Keppra  - BP goal <140/90  - CTM    R 4th rib fracture  - MMPC  - Frequent IS/flutter valve when able  - Pulmonary hygiene  - CTM respiratory status    Nondisplaced left occipital bone fracture   - Symptomatic management  - CTM      Jose Armando Monroe MD  General Surgery PGY-1  [facility]

## 2025-02-06 LAB
ABO + RH BLD: NORMAL
ABO + RH BLD: NORMAL
ALBUMIN SERPL-MCNC: 2.8 G/DL (ref 3.4–4.8)
ALBUMIN/GLOB SERPL: 0.8 RATIO (ref 1.1–2)
ALLENS TEST BLOOD GAS (OHS): ABNORMAL
ALP SERPL-CCNC: 58 UNIT/L (ref 40–150)
ALT SERPL-CCNC: 22 UNIT/L (ref 0–55)
ANION GAP SERPL CALC-SCNC: 9 MEQ/L
AST SERPL-CCNC: 27 UNIT/L (ref 5–34)
BASE EXCESS BLD CALC-SCNC: 2.5 MMOL/L (ref -2–2)
BASOPHILS # BLD AUTO: 0.01 X10(3)/MCL
BASOPHILS NFR BLD AUTO: 0.1 %
BILIRUB SERPL-MCNC: 0.5 MG/DL
BLD PROD TYP BPU: NORMAL
BLD PROD TYP BPU: NORMAL
BLOOD GAS SAMPLE TYPE (OHS): ABNORMAL
BLOOD UNIT EXPIRATION DATE: NORMAL
BLOOD UNIT EXPIRATION DATE: NORMAL
BLOOD UNIT TYPE CODE: 5100
BLOOD UNIT TYPE CODE: 5100
BUN SERPL-MCNC: 12.9 MG/DL (ref 8.4–25.7)
CA-I BLD-SCNC: 1.13 MMOL/L (ref 1.12–1.23)
CALCIUM SERPL-MCNC: 8.2 MG/DL (ref 8.8–10)
CHLORIDE SERPL-SCNC: 115 MMOL/L (ref 98–107)
CO2 BLDA-SCNC: 27.4 MMOL/L
CO2 SERPL-SCNC: 22 MMOL/L (ref 23–31)
COHGB MFR BLDA: 2.4 % (ref 0.5–1.5)
CREAT SERPL-MCNC: 0.74 MG/DL (ref 0.72–1.25)
CREAT/UREA NIT SERPL: 17
CROSSMATCH INTERPRETATION: NORMAL
CROSSMATCH INTERPRETATION: NORMAL
CRP SERPL-MCNC: 222.8 MG/L
DISPENSE STATUS: NORMAL
DISPENSE STATUS: NORMAL
DRAWN BY BLOOD GAS (OHS): ABNORMAL
EOSINOPHIL # BLD AUTO: 0 X10(3)/MCL (ref 0–0.9)
EOSINOPHIL NFR BLD AUTO: 0 %
ERYTHROCYTE [DISTWIDTH] IN BLOOD BY AUTOMATED COUNT: 14.2 % (ref 11.5–17)
GFR SERPLBLD CREATININE-BSD FMLA CKD-EPI: >60 ML/MIN/1.73/M2
GLOBULIN SER-MCNC: 3.3 GM/DL (ref 2.4–3.5)
GLUCOSE SERPL-MCNC: 221 MG/DL (ref 82–115)
HCO3 BLDA-SCNC: 26.3 MMOL/L (ref 22–26)
HCT VFR BLD AUTO: 33.2 % (ref 42–52)
HGB BLD-MCNC: 10.7 G/DL (ref 14–18)
IMM GRANULOCYTES # BLD AUTO: 0.05 X10(3)/MCL (ref 0–0.04)
IMM GRANULOCYTES NFR BLD AUTO: 0.5 %
INHALED O2 CONCENTRATION: 40 %
LYMPHOCYTES # BLD AUTO: 0.83 X10(3)/MCL (ref 0.6–4.6)
LYMPHOCYTES NFR BLD AUTO: 8.8 %
MCH RBC QN AUTO: 29.6 PG (ref 27–31)
MCHC RBC AUTO-ENTMCNC: 32.2 G/DL (ref 33–36)
MCV RBC AUTO: 92 FL (ref 80–94)
MECH RR (OHS): 18 B/MIN
METHGB MFR BLDA: 0.6 % (ref 0.4–1.5)
MODE (OHS): AC
MONOCYTES # BLD AUTO: 0.81 X10(3)/MCL (ref 0.1–1.3)
MONOCYTES NFR BLD AUTO: 8.6 %
NEUTROPHILS # BLD AUTO: 7.74 X10(3)/MCL (ref 2.1–9.2)
NEUTROPHILS NFR BLD AUTO: 82 %
NRBC BLD AUTO-RTO: 0 %
O2 HB BLOOD GAS (OHS): 97.3 % (ref 94–97)
OXYGEN DEVICE BLOOD GAS (OHS): ABNORMAL
OXYHGB MFR BLDA: 11.5 G/DL (ref 12–16)
PCO2 BLDA: 37 MMHG (ref 35–45)
PEEP RESPIRATORY: 5 CMH2O
PH BLDA: 7.46 [PH] (ref 7.35–7.45)
PLATELET # BLD AUTO: 173 X10(3)/MCL (ref 130–400)
PMV BLD AUTO: 10.3 FL (ref 7.4–10.4)
PO2 BLDA: 121 MMHG (ref 80–100)
POCT GLUCOSE: 204 MG/DL (ref 70–110)
POCT GLUCOSE: 219 MG/DL (ref 70–110)
POCT GLUCOSE: 257 MG/DL (ref 70–110)
POCT GLUCOSE: 258 MG/DL (ref 70–110)
POCT GLUCOSE: 269 MG/DL (ref 70–110)
POCT GLUCOSE: 273 MG/DL (ref 70–110)
POTASSIUM BLOOD GAS (OHS): 3.6 MMOL/L (ref 3.5–5)
POTASSIUM SERPL-SCNC: 3.5 MMOL/L (ref 3.5–5.1)
PREALB SERPL-MCNC: 11.5 MG/DL (ref 16–42)
PROT SERPL-MCNC: 6.1 GM/DL (ref 5.8–7.6)
RBC # BLD AUTO: 3.61 X10(6)/MCL (ref 4.7–6.1)
SAMPLE SITE BLOOD GAS (OHS): ABNORMAL
SAO2 % BLDA: 98.9 %
SODIUM BLOOD GAS (OHS): 146 MMOL/L (ref 137–145)
SODIUM SERPL-SCNC: 146 MMOL/L (ref 136–145)
SODIUM SERPL-SCNC: 147 MMOL/L (ref 136–145)
SODIUM SERPL-SCNC: 147 MMOL/L (ref 136–145)
SODIUM SERPL-SCNC: 151 MMOL/L (ref 136–145)
SODIUM SERPL-SCNC: 152 MMOL/L (ref 136–145)
SODIUM SERPL-SCNC: 154 MMOL/L (ref 136–145)
SPONT+MECH VT ON VENT: 500 ML
UNIT NUMBER: NORMAL
UNIT NUMBER: NORMAL
WBC # BLD AUTO: 9.44 X10(3)/MCL (ref 4.5–11.5)

## 2025-02-06 PROCEDURE — 63600175 PHARM REV CODE 636 W HCPCS: Performed by: SURGERY

## 2025-02-06 PROCEDURE — 63600175 PHARM REV CODE 636 W HCPCS: Performed by: PHYSICIAN ASSISTANT

## 2025-02-06 PROCEDURE — 80053 COMPREHEN METABOLIC PANEL: CPT

## 2025-02-06 PROCEDURE — 99900026 HC AIRWAY MAINTENANCE (STAT)

## 2025-02-06 PROCEDURE — 63600175 PHARM REV CODE 636 W HCPCS: Performed by: NURSE PRACTITIONER

## 2025-02-06 PROCEDURE — 94760 N-INVAS EAR/PLS OXIMETRY 1: CPT

## 2025-02-06 PROCEDURE — 99291 CRITICAL CARE FIRST HOUR: CPT | Mod: ,,, | Performed by: SURGERY

## 2025-02-06 PROCEDURE — 27200966 HC CLOSED SUCTION SYSTEM

## 2025-02-06 PROCEDURE — 20800000 HC ICU TRAUMA

## 2025-02-06 PROCEDURE — 99900031 HC PATIENT EDUCATION (STAT)

## 2025-02-06 PROCEDURE — 25000003 PHARM REV CODE 250: Performed by: SURGERY

## 2025-02-06 PROCEDURE — 36415 COLL VENOUS BLD VENIPUNCTURE: CPT

## 2025-02-06 PROCEDURE — 84134 ASSAY OF PREALBUMIN: CPT

## 2025-02-06 PROCEDURE — 99900035 HC TECH TIME PER 15 MIN (STAT)

## 2025-02-06 PROCEDURE — 20000000 HC ICU ROOM

## 2025-02-06 PROCEDURE — 25000003 PHARM REV CODE 250

## 2025-02-06 PROCEDURE — 37799 UNLISTED PX VASCULAR SURGERY: CPT

## 2025-02-06 PROCEDURE — 25000003 PHARM REV CODE 250: Performed by: NURSE PRACTITIONER

## 2025-02-06 PROCEDURE — 63600175 PHARM REV CODE 636 W HCPCS

## 2025-02-06 PROCEDURE — 27100171 HC OXYGEN HIGH FLOW UP TO 24 HOURS

## 2025-02-06 PROCEDURE — 94003 VENT MGMT INPAT SUBQ DAY: CPT

## 2025-02-06 PROCEDURE — 25000003 PHARM REV CODE 250: Performed by: PHYSICIAN ASSISTANT

## 2025-02-06 PROCEDURE — 86140 C-REACTIVE PROTEIN: CPT

## 2025-02-06 PROCEDURE — 94761 N-INVAS EAR/PLS OXIMETRY MLT: CPT | Mod: XB

## 2025-02-06 PROCEDURE — 84295 ASSAY OF SERUM SODIUM: CPT | Performed by: SURGERY

## 2025-02-06 PROCEDURE — 36415 COLL VENOUS BLD VENIPUNCTURE: CPT | Performed by: SURGERY

## 2025-02-06 PROCEDURE — 82803 BLOOD GASES ANY COMBINATION: CPT

## 2025-02-06 PROCEDURE — 85025 COMPLETE CBC W/AUTO DIFF WBC: CPT

## 2025-02-06 RX ORDER — INSULIN ASPART 100 [IU]/ML
0-10 INJECTION, SOLUTION INTRAVENOUS; SUBCUTANEOUS EVERY 4 HOURS PRN
Status: DISCONTINUED | OUTPATIENT
Start: 2025-02-06 | End: 2025-02-12

## 2025-02-06 RX ORDER — DIPHENHYDRAMINE HYDROCHLORIDE 50 MG/ML
25 INJECTION INTRAMUSCULAR; INTRAVENOUS ONCE
Status: COMPLETED | OUTPATIENT
Start: 2025-02-07 | End: 2025-02-06

## 2025-02-06 RX ORDER — DOCUSATE SODIUM 50 MG/5ML
100 LIQUID ORAL 2 TIMES DAILY
Status: DISCONTINUED | OUTPATIENT
Start: 2025-02-06 | End: 2025-02-12

## 2025-02-06 RX ORDER — THIAMINE HCL 100 MG
100 TABLET ORAL DAILY
Status: DISCONTINUED | OUTPATIENT
Start: 2025-02-07 | End: 2025-02-12

## 2025-02-06 RX ORDER — PROPRANOLOL HYDROCHLORIDE 20 MG/1
40 TABLET ORAL 3 TIMES DAILY
Status: DISCONTINUED | OUTPATIENT
Start: 2025-02-06 | End: 2025-02-07

## 2025-02-06 RX ORDER — DEXTROSE MONOHYDRATE 100 MG/ML
INJECTION, SOLUTION INTRAVENOUS CONTINUOUS PRN
Status: DISCONTINUED | OUTPATIENT
Start: 2025-02-06 | End: 2025-02-21 | Stop reason: HOSPADM

## 2025-02-06 RX ORDER — METHOCARBAMOL 500 MG/1
500 TABLET, FILM COATED ORAL EVERY 8 HOURS
Status: DISCONTINUED | OUTPATIENT
Start: 2025-02-06 | End: 2025-02-12

## 2025-02-06 RX ORDER — POLYETHYLENE GLYCOL 3350 17 G/17G
17 POWDER, FOR SOLUTION ORAL 2 TIMES DAILY
Status: DISCONTINUED | OUTPATIENT
Start: 2025-02-06 | End: 2025-02-12

## 2025-02-06 RX ORDER — FOLIC ACID 1 MG/1
1 TABLET ORAL DAILY
Status: DISCONTINUED | OUTPATIENT
Start: 2025-02-07 | End: 2025-02-12

## 2025-02-06 RX ORDER — FAMOTIDINE 20 MG/1
20 TABLET, FILM COATED ORAL 2 TIMES DAILY
Status: DISCONTINUED | OUTPATIENT
Start: 2025-02-06 | End: 2025-02-12

## 2025-02-06 RX ORDER — GLUCAGON 1 MG
1 KIT INJECTION
Status: DISCONTINUED | OUTPATIENT
Start: 2025-02-06 | End: 2025-02-21 | Stop reason: HOSPADM

## 2025-02-06 RX ORDER — INSULIN GLARGINE 100 [IU]/ML
10 INJECTION, SOLUTION SUBCUTANEOUS NIGHTLY
Status: DISCONTINUED | OUTPATIENT
Start: 2025-02-06 | End: 2025-02-08

## 2025-02-06 RX ADMIN — LABETALOL HYDROCHLORIDE 20 MG: 5 INJECTION, SOLUTION INTRAVENOUS at 01:02

## 2025-02-06 RX ADMIN — FAMOTIDINE 20 MG: 10 INJECTION, SOLUTION INTRAVENOUS at 08:02

## 2025-02-06 RX ADMIN — LEVETIRACETAM 500 MG: 100 INJECTION, SOLUTION INTRAVENOUS at 08:02

## 2025-02-06 RX ADMIN — SODIUM CHLORIDE: 4 INJECTION, SOLUTION, CONCENTRATE INTRAVENOUS at 10:02

## 2025-02-06 RX ADMIN — ACETAMINOPHEN 650 MG: 650 SOLUTION ORAL at 09:02

## 2025-02-06 RX ADMIN — MORPHINE SULFATE 2 MG: 4 INJECTION, SOLUTION INTRAMUSCULAR; INTRAVENOUS at 01:02

## 2025-02-06 RX ADMIN — INSULIN ASPART 6 UNITS: 100 INJECTION, SOLUTION INTRAVENOUS; SUBCUTANEOUS at 08:02

## 2025-02-06 RX ADMIN — METHOCARBAMOL 500 MG: 500 TABLET ORAL at 05:02

## 2025-02-06 RX ADMIN — INSULIN ASPART 6 UNITS: 100 INJECTION, SOLUTION INTRAVENOUS; SUBCUTANEOUS at 04:02

## 2025-02-06 RX ADMIN — FOLIC ACID 1 MG: 1 TABLET ORAL at 08:02

## 2025-02-06 RX ADMIN — MUPIROCIN: 20 OINTMENT TOPICAL at 08:02

## 2025-02-06 RX ADMIN — ENALAPRILAT 1.25 MG: 2.5 INJECTION INTRAVENOUS at 01:02

## 2025-02-06 RX ADMIN — ACETAMINOPHEN 650 MG: 650 SOLUTION ORAL at 02:02

## 2025-02-06 RX ADMIN — POLYETHYLENE GLYCOL 3350 17 G: 17 POWDER, FOR SOLUTION ORAL at 08:02

## 2025-02-06 RX ADMIN — POTASSIUM BICARBONATE 25 MEQ: 978 TABLET, EFFERVESCENT ORAL at 08:02

## 2025-02-06 RX ADMIN — PROPRANOLOL HYDROCHLORIDE 40 MG: 20 TABLET ORAL at 04:02

## 2025-02-06 RX ADMIN — DIPHENHYDRAMINE HYDROCHLORIDE 25 MG: 50 INJECTION INTRAMUSCULAR; INTRAVENOUS at 11:02

## 2025-02-06 RX ADMIN — INSULIN ASPART 6 UNITS: 100 INJECTION, SOLUTION INTRAVENOUS; SUBCUTANEOUS at 11:02

## 2025-02-06 RX ADMIN — ACETAMINOPHEN 650 MG: 650 SOLUTION ORAL at 05:02

## 2025-02-06 RX ADMIN — DOCUSATE SODIUM LIQUID 100 MG: 100 LIQUID ORAL at 08:02

## 2025-02-06 RX ADMIN — HYDRALAZINE HYDROCHLORIDE 10 MG: 20 INJECTION INTRAMUSCULAR; INTRAVENOUS at 12:02

## 2025-02-06 RX ADMIN — METHOCARBAMOL 500 MG: 500 TABLET ORAL at 01:02

## 2025-02-06 RX ADMIN — INSULIN ASPART 4 UNITS: 100 INJECTION, SOLUTION INTRAVENOUS; SUBCUTANEOUS at 11:02

## 2025-02-06 RX ADMIN — THERA TABS 1 TABLET: TAB at 08:02

## 2025-02-06 RX ADMIN — INSULIN ASPART 2 UNITS: 100 INJECTION, SOLUTION INTRAVENOUS; SUBCUTANEOUS at 05:02

## 2025-02-06 RX ADMIN — THIAMINE HCL TAB 100 MG 100 MG: 100 TAB at 08:02

## 2025-02-06 RX ADMIN — DOCUSATE SODIUM 100 MG: 100 CAPSULE, LIQUID FILLED ORAL at 08:02

## 2025-02-06 RX ADMIN — ACETAMINOPHEN 650 MG: 650 SOLUTION ORAL at 01:02

## 2025-02-06 RX ADMIN — METHOCARBAMOL 500 MG: 500 TABLET ORAL at 09:02

## 2025-02-06 RX ADMIN — INSULIN ASPART 1 UNITS: 100 INJECTION, SOLUTION INTRAVENOUS; SUBCUTANEOUS at 12:02

## 2025-02-06 RX ADMIN — PROPRANOLOL HYDROCHLORIDE 40 MG: 20 TABLET ORAL at 08:02

## 2025-02-06 RX ADMIN — INSULIN GLARGINE 10 UNITS: 100 INJECTION, SOLUTION SUBCUTANEOUS at 10:02

## 2025-02-06 RX ADMIN — FAMOTIDINE 20 MG: 20 TABLET, FILM COATED ORAL at 08:02

## 2025-02-06 RX ADMIN — ACETAMINOPHEN 650 MG: 650 SOLUTION ORAL at 08:02

## 2025-02-06 NOTE — PLAN OF CARE
Trauma multidisciplinary rounding;  talking with wife Reina regarding pts prognosis and treatment plan

## 2025-02-06 NOTE — PROGRESS NOTES
Intubated. Pupils reactive.  Minimal withdrawal.  RORO working.   Poor prognosis discussed.  Will follow.

## 2025-02-06 NOTE — PROGRESS NOTES
Trauma ICU Progress Note    Patient Information:   Patient Name: Ezequiel Ramires                   : 1960     MRN: [id number]   Date of Admission: 2025  Code Status: Full Code  Date of Exam: 2025  HD#2  POD#2 Days Post-Op  Attending Provider: Maik Moreno Jr., *  Admission Summary:   Patient is a 64 year old  male with PMH of CAD, HTN, DM, HLD, alcoholism, arthritis, anxiety, MI who presents as a transfer from [facility] with bilateral SDH with 0.8 right to left midline shift, SAH, left occipital fracture, right 4th rib fracture. Report per wife patient was last seen normal around 1930 yesterday, she woke this morning to find him on the ground next to the bed with vomit on the floor, he was taken to the OSH at around 0500     Interval history:    Patient has been off sedation >24hours and is still not doing anything    Consults:   Consults: Neurosurgery Injuries:  1. Acute nondisplaced R 4th rib fracture  2. Tree-in-bud nodularities in the bilateral lower lobes which could be due to infectious bronchiolitis or aspiration   3. 4 mm anterolisthesis of C4 on C5 due to degeneration versus trauma  4. Nondisplaced left occipital bone fracture extending inferiorly to the level of the foramen magnum   5. Bilateral SDH  6. Bilateral IPH  7. Bilateral SAH  8. Small layering hemorrhage in the left occipital horn   9. 8 mm right to left midline shift    []Problems list reviewed  []Tertiary exam performed Operations/Procedures:  Crani 25     Past medical history:  HTN  HLD  DMII  Psoriasis  CAD s/p stents  MI    Medications: [x] Medications reviewed/updated   Home Meds:    Current Outpatient Medications   Medication Instructions    amLODIPine (NORVASC) 5 MG tablet 1 tablet, Daily    aspirin (ECOTRIN) 81 MG EC tablet 1 tablet, Oral, Every morning    ezetimibe (ZETIA) 10 mg tablet 1 tablet, Daily    icosapent ethyL (VASCEPA) 2,000 mg, Nightly    losartan (COZAAR) 100 MG tablet 1 tablet, Nightly    metoprolol  tartrate (LOPRESSOR) 50 MG tablet 1 tablet, 2 times daily    rosuvastatin (CRESTOR) 10 MG tablet 1 tablet, Nightly      Scheduled Meds:    acetaminophen  650 mg Per OG tube Q4H    docusate sodium  100 mg Oral BID    famotidine (PF)  20 mg Intravenous BID    folic acid  1 mg Oral Daily    levETIRAcetam (Keppra) IV (PEDS and ADULTS)  500 mg Intravenous Q12H    methocarbamoL  500 mg Oral Q8H    multivitamin  1 tablet Oral Daily    mupirocin   Nasal BID    polyethylene glycol  17 g Oral BID    thiamine  100 mg Oral Daily     Continuous Infusions:    0.9% NaCl 500 mL with sodium chloride (23.4%) HYPERTONIC 4 mEq/mL 232 mEq infusion   Intravenous Continuous 50 mL/hr at 02/06/25 0608 Rate Verify at 02/06/25 0608    0.9% NaCl   Intravenous Continuous        0.9% NaCl   Intravenous Continuous   Stopped at 02/05/25 1246    nicardipine  0-15 mg/hr Intravenous Continuous   Stopped at 02/05/25 0501    propofoL  0-50 mcg/kg/min Intravenous Continuous   Stopped at 02/05/25 0718     PRN Meds:   Current Facility-Administered Medications:     acetaminophen, 650 mg, Rectal, Q4H PRN    acetaminophen, 650 mg, Oral, Q4H PRN    bisacodyL, 10 mg, Rectal, Daily PRN    dextrose 50%, 12.5 g, Intravenous, PRN    enalaprilat, 1.25 mg, Intravenous, Q4H PRN    glucagon (human recombinant), 1 mg, Intramuscular, PRN    hydrALAZINE, 10 mg, Intravenous, Q4H PRN    insulin aspart U-100, 0-5 Units, Subcutaneous, Q6H PRN    labetaloL, 20 mg, Intravenous, Q4H PRN    LORazepam, 2 mg, Oral, Q4H PRN    melatonin, 6 mg, Oral, Nightly PRN    morphine, 2 mg, Intravenous, Q2H PRN    ondansetron, 4 mg, Intravenous, Q6H PRN    oxyCODONE, 5 mg, Oral, Q4H PRN    prochlorperazine, 5 mg, Intravenous, Q6H PRN    Flushing PICC/Midline Protocol, , , Until Discontinued **AND** sodium chloride 0.9%, 10 mL, Intravenous, Q12H PRN     Vitals:  VITAL SIGNS: 24 HR MIN & MAX LAST   Temp  Min: 98.7 °F (37.1 °C)  Max: 99.9 °F (37.7 °C)  98.9 °F (37.2 °C)   BP  Min: 100/60  Max:  "169/75  129/77    Pulse  Min: 88  Max: 112  93    Resp  Min: 18  Max: 28  (!) 25    SpO2  Min: 94 %  Max: 100 %  97 %      HT: 6' 0.01" (182.9 cm)  WT: 80 kg (176 lb 5.9 oz)  BMI: 23.9   Ideal Body Weight (IBW), Male: 178.06 lb  % Ideal Body Weight, Male (lb): 99.08 %        General  Exam: intubated      Neuro/Psych  GCS: 4T (E 1) (V T) (M 2)  Exam: Decerebrate posturing in uppers triple flexion in lowers no purposeful movement. Crani incision c/d/I Corbin drain +SS drainage  ICP monitor: No  ICP treatment: ICP Treatment: 3% saline   C-Collar: No    Plan:   Bilateral SDH s/p crani- Continue 3% saline with Na goal of 150-155, Keppra x 7 days, HOB > 30 degrees. BP<140. Poor prognosis     HEENT  Exam: NCAT ETT/OGT in place    Plan:   monitoring     Pulmonary  Vitals: Resp  Av.2  Min: 18  Max: 28  SpO2  Av.2 %  Min: 94 %  Max: 100 %    Ventilator/Oxygen Settings:   Vent Mode: A/C  Vt Set: 500 mL  Set Rate: 18 BPM  I:E Ratio Measured: 1:3.3  Total PEEP: 5 cmH20 Vent Mode: A/C (25)  Ventilator Initiated: Yes (25 1240)  Set Rate: 18 BPM (25)  Vt Set: 500 mL (25)  PEEP/CPAP: 5 cmH20 (25)  Oxygen Concentration (%): 40 (25 0740)  Peak Airway Pressure: 22 cmH20 (25)  Total Ve: 12.5 L/m (25)  F/VT Ratio<105 (RSBI): (!) 35.9 (25)        ABG:   Recent Labs   Lab 25  0750   PH 7.460*   PO2 121.0*   PCO2 37.0   HCO3 26.3*        CXR:    X-Ray Chest 1 View for Line/Tube Placement    Result Date: 2025  PICC line insertion. Confluent opacities in the left retrocardiac region with silhouetting of the left hemidiaphragm which might be related to an infiltrate/atelectasis. No other change Electronically signed by: Serg Mera Date:    2025 Time:    11:33        Rib fractures: right rib fracture  Chest Tube: None     Exam: Coarse BS bilaterally on minimal vent settings     Plan:     Remaining intubated due to poor mental " "status  Incentive Spirometry/RT Treatments: none     Cardiovascular  Vitals: Pulse  Av.1  Min: 88  Max: 112  BP  Min: 100/60  Max: 169/75  No results for input(s): "TROPONINI", "CKTOTAL", "CKMB", "BNP" in the last 168 hours.  Vasoactive Agents: None  Exam: RRR    Plan:   HTN- prns     Renal  Recent Labs     25  0857 25  1333 25  0401 25  0352   BUN 19 22.5 18.4 12.9   CREATININE 0.95 1.12 0.86 0.74       No results for input(s): "LACTIC" in the last 72 hours.    Intake/Output - Last 3 Shifts          07 0659 59  07 0659    I.V. (mL/kg) 1478.2 (18.5) 1224.3 (15.3)     NG/GT  580     IV Piggyback 1680.5 378.6     Total Intake(mL/kg) 3158.7 (39.5) 2182.9 (27.3)     Urine (mL/kg/hr) 1880 1465 (0.8) 175 (0.9)    Drains 20 40     Total Output 1900 1505 175    Net +1258.7 +677.9 -175                    Intake/Output Summary (Last 24 hours) at 2025 0933  Last data filed at 2025 0740  Gross per 24 hour   Intake 2082.92 ml   Output 1530 ml   Net 552.92 ml         Radha: Yes     Plan:   Continue for accurate I/Os     FEN/GI  Recent Labs     25  0857 25  1333 25  1333 25  0401 25  1157 25  1809 25  2354 25  0352 25  0654    140   < > 141   < > 144 147* 146* 147*   K 4.6 3.5  --  3.3*  --   --   --  3.5  --    CL 96* 103  --  107  --   --   --  115*  --    CO2 22 21*  --  23  --   --   --  22*  --    CALCIUM 8.7* 8.9  --  8.3*  --   --   --  8.2*  --    ALBUMIN  --   --   --  3.4  --   --   --  2.8*  --    BILITOT  --   --   --  0.7  --   --   --  0.5  --    AST  --   --   --  83*  --   --   --  27  --    ALKPHOS  --   --   --  58  --   --   --  58  --    ALT  --   --   --  37  --   --   --  22  --     < > = values in this interval not displayed.       Diet: NPO tube feeds    Last BM: none    Abdominal Exam: s/NT/ND +BS    Plan:   Advance tube feeds to goal     Heme/Onc  Recent Labs     " 25  0527 25  0636 25  1333 25  0401 25  0130   HGB  --   --  14.3 12.0* 10.7*   HCT  --   --  40.3* 36.0* 33.2*   PLT  --   --  234 208 173   INR 1.1 1 1.2  --   --        Transfusions (over past 24h): None    Plan:   monitor     ID  Temp  Av.3 °F (37.4 °C)  Min: 98.7 °F (37.1 °C)  Max: 99.9 °F (37.7 °C)      Recent Labs     25  1333 25  0401 25  0130   WBC 12.17* 10.57 9.44       Cultures: Antibiotics:    none 1. none     Plan:   monitor     Endocrine  Recent Labs     25  1333 25  0401 25  0352   GLUCOSE 249* 247* 221*      Recent Labs     25  2355 25  1144 25  1746 25  2359   POCTGLUCOSE 288* 252* 265* 219*        Plan:   DMII- will advance to moderate sliding scale  Insulin treatment: low SS     Musculoskeletal  Weight bearing status:   RUE: WBAT  LUE: WBAT  RLE: WBAT  LLE: WBAT    Exam: does not have purposeful movement  Plan:   monitor     Wounds  Wounds exam: crani incision c/d/i  Wound vac: No   Media: none  Plan: local wound care     Precautions  Precautions: Seizure     Prophylaxis  Seizure: Keppra (day )  DVT: Holding lovenox    GI: H2B     Lines/drains/airway   Lines/Drains/Airways       Peripherally Inserted Central Catheter Line  Duration             PICC Triple Lumen 25 1106 right basilic <1 day              Drain  Duration                  NG/OG Tube 25 orogastric Center mouth 2 days         Closed/Suction Drain 25 1600 Tube - 1 Posterior;Right;Superior Other (Comment) Bulb 10 Fr. 1 day         Urethral Catheter 25 1730 1 day              Airway  Duration                  Airway - Non-Surgical 25 1200 Endotracheal Tube 1 day              Peripheral Intravenous Line  Duration                  Peripheral IV - Single Lumen 25 1322 18 G Left Antecubital 1 day         Peripheral IV - Single Lumen 25 1730 20 G;18 G Right Antecubital 1 day                    Plan:  Cont  all lines    [x]LDA reviewed/updated      Restraints  Face to face evaluation of need for restraints on rounds today:   Currently restrained? No.        Assessment & Disposition:   Problem list:  Active Problem List with Overview Notes    Diagnosis Date Noted    Intraparenchymal hemorrhage of brain 02/05/2025    IVH (intraventricular hemorrhage) 02/05/2025    SDH (subdural hematoma) 02/04/2025    SAH (subarachnoid hemorrhage) 02/04/2025    Closed fracture of one rib of right side 02/04/2025    Closed fracture of left side of occipital bone 02/04/2025       Guarded. Poor prognosis.  Bilateral SDH s/p crani- Continue 3% saline with Na goal of 150-155, Keppra x 7 days, HOB > 30 degrees. BP<140. Poor prognosis  Remaining intubated due to poor mental status  HTN- prns  Advance tube feeds to goal  DMII- will advance to moderate sliding scale  Cont alvarado for accurate I/os     Critical Care Time:   42 minutes of critical care was spent on this patient personally by me on the following activities: development of treatment plan with patient and bedside nurse, discussions with consultants, evaluation of patient's response to treatment, examining the patient, ordering and preforming treatments and interventions, ordering and reviewing laboratory studies, ordering and reviewing radiologic studies, and re-evaluation of patient's condition.     Maik Moreno Jr, MD, MS  Trauma Critical Care Surgery  [facility]   02/06/2025

## 2025-02-07 LAB
ALBUMIN SERPL-MCNC: 2.5 G/DL (ref 3.4–4.8)
ALBUMIN/GLOB SERPL: 0.8 RATIO (ref 1.1–2)
ALLENS TEST BLOOD GAS (OHS): YES
ALP SERPL-CCNC: 53 UNIT/L (ref 40–150)
ALT SERPL-CCNC: 22 UNIT/L (ref 0–55)
ANION GAP SERPL CALC-SCNC: 8 MEQ/L
AST SERPL-CCNC: 24 UNIT/L (ref 5–34)
BACTERIA SPEC ANAEROBE CULT: NORMAL
BASE EXCESS BLD CALC-SCNC: 1.1 MMOL/L (ref -2–2)
BASOPHILS # BLD AUTO: 0.04 X10(3)/MCL
BASOPHILS NFR BLD AUTO: 0.5 %
BILIRUB SERPL-MCNC: 0.4 MG/DL
BLOOD GAS SAMPLE TYPE (OHS): ABNORMAL
BUN SERPL-MCNC: 18.5 MG/DL (ref 8.4–25.7)
CA-I BLD-SCNC: 1.16 MMOL/L (ref 1.12–1.23)
CALCIUM SERPL-MCNC: 7.9 MG/DL (ref 8.8–10)
CHLORIDE SERPL-SCNC: 126 MMOL/L (ref 98–107)
CO2 BLDA-SCNC: 26.1 MMOL/L
CO2 SERPL-SCNC: 22 MMOL/L (ref 23–31)
COHGB MFR BLDA: 2.1 % (ref 0.5–1.5)
CREAT SERPL-MCNC: 0.71 MG/DL (ref 0.72–1.25)
CREAT/UREA NIT SERPL: 26
DRAWN BY BLOOD GAS (OHS): ABNORMAL
EOSINOPHIL # BLD AUTO: 0 X10(3)/MCL (ref 0–0.9)
EOSINOPHIL NFR BLD AUTO: 0 %
ERYTHROCYTE [DISTWIDTH] IN BLOOD BY AUTOMATED COUNT: 14.4 % (ref 11.5–17)
GFR SERPLBLD CREATININE-BSD FMLA CKD-EPI: >60 ML/MIN/1.73/M2
GLOBULIN SER-MCNC: 3.3 GM/DL (ref 2.4–3.5)
GLUCOSE SERPL-MCNC: 160 MG/DL (ref 82–115)
HCO3 BLDA-SCNC: 25 MMOL/L (ref 22–26)
HCT VFR BLD AUTO: 30.5 % (ref 42–52)
HGB BLD-MCNC: 9.6 G/DL (ref 14–18)
IMM GRANULOCYTES # BLD AUTO: 0.04 X10(3)/MCL (ref 0–0.04)
IMM GRANULOCYTES NFR BLD AUTO: 0.5 %
INHALED O2 CONCENTRATION: 30 %
LYMPHOCYTES # BLD AUTO: 1.25 X10(3)/MCL (ref 0.6–4.6)
LYMPHOCYTES NFR BLD AUTO: 15.7 %
MCH RBC QN AUTO: 29.5 PG (ref 27–31)
MCHC RBC AUTO-ENTMCNC: 31.5 G/DL (ref 33–36)
MCV RBC AUTO: 93.8 FL (ref 80–94)
MECH RR (OHS): 18 B/MIN
METHGB MFR BLDA: 0.9 % (ref 0.4–1.5)
MODE (OHS): AC
MONOCYTES # BLD AUTO: 0.7 X10(3)/MCL (ref 0.1–1.3)
MONOCYTES NFR BLD AUTO: 8.8 %
NEUTROPHILS # BLD AUTO: 5.92 X10(3)/MCL (ref 2.1–9.2)
NEUTROPHILS NFR BLD AUTO: 74.5 %
NRBC BLD AUTO-RTO: 0 %
O2 HB BLOOD GAS (OHS): 96.5 % (ref 94–97)
OXYGEN DEVICE BLOOD GAS (OHS): ABNORMAL
OXYHGB MFR BLDA: 9.9 G/DL (ref 12–16)
PCO2 BLDA: 36 MMHG (ref 35–45)
PEEP RESPIRATORY: 5 CMH2O
PH BLDA: 7.45 [PH] (ref 7.35–7.45)
PLATELET # BLD AUTO: 176 X10(3)/MCL (ref 130–400)
PMV BLD AUTO: 10.4 FL (ref 7.4–10.4)
PO2 BLDA: 110 MMHG (ref 80–100)
POCT GLUCOSE: 172 MG/DL (ref 70–110)
POCT GLUCOSE: 210 MG/DL (ref 70–110)
POCT GLUCOSE: 229 MG/DL (ref 70–110)
POCT GLUCOSE: 251 MG/DL (ref 70–110)
POCT GLUCOSE: 254 MG/DL (ref 70–110)
POCT GLUCOSE: 262 MG/DL (ref 70–110)
POTASSIUM BLOOD GAS (OHS): 3.6 MMOL/L (ref 3.5–5)
POTASSIUM SERPL-SCNC: 3.7 MMOL/L (ref 3.5–5.1)
PROT SERPL-MCNC: 5.8 GM/DL (ref 5.8–7.6)
PS (OHS): 10 CMH2O
RBC # BLD AUTO: 3.25 X10(6)/MCL (ref 4.7–6.1)
SAMPLE SITE BLOOD GAS (OHS): ABNORMAL
SAO2 % BLDA: 98.5 %
SODIUM BLOOD GAS (OHS): 154 MMOL/L (ref 137–145)
SODIUM SERPL-SCNC: 153 MMOL/L (ref 136–145)
SODIUM SERPL-SCNC: 155 MMOL/L (ref 136–145)
SODIUM SERPL-SCNC: 155 MMOL/L (ref 136–145)
SODIUM SERPL-SCNC: 156 MMOL/L (ref 136–145)
SPONT+MECH VT ON VENT: 500 ML
WBC # BLD AUTO: 7.95 X10(3)/MCL (ref 4.5–11.5)

## 2025-02-07 PROCEDURE — 51798 US URINE CAPACITY MEASURE: CPT

## 2025-02-07 PROCEDURE — 63600175 PHARM REV CODE 636 W HCPCS

## 2025-02-07 PROCEDURE — 82803 BLOOD GASES ANY COMBINATION: CPT

## 2025-02-07 PROCEDURE — 99900031 HC PATIENT EDUCATION (STAT)

## 2025-02-07 PROCEDURE — 36415 COLL VENOUS BLD VENIPUNCTURE: CPT

## 2025-02-07 PROCEDURE — 25000003 PHARM REV CODE 250

## 2025-02-07 PROCEDURE — 63600175 PHARM REV CODE 636 W HCPCS: Performed by: SURGERY

## 2025-02-07 PROCEDURE — 37799 UNLISTED PX VASCULAR SURGERY: CPT

## 2025-02-07 PROCEDURE — 99900026 HC AIRWAY MAINTENANCE (STAT)

## 2025-02-07 PROCEDURE — 51701 INSERT BLADDER CATHETER: CPT

## 2025-02-07 PROCEDURE — 99291 CRITICAL CARE FIRST HOUR: CPT | Mod: ,,, | Performed by: SURGERY

## 2025-02-07 PROCEDURE — 97163 PT EVAL HIGH COMPLEX 45 MIN: CPT

## 2025-02-07 PROCEDURE — 27100171 HC OXYGEN HIGH FLOW UP TO 24 HOURS

## 2025-02-07 PROCEDURE — 25000003 PHARM REV CODE 250: Performed by: SURGERY

## 2025-02-07 PROCEDURE — 27200966 HC CLOSED SUCTION SYSTEM

## 2025-02-07 PROCEDURE — 80053 COMPREHEN METABOLIC PANEL: CPT

## 2025-02-07 PROCEDURE — 94003 VENT MGMT INPAT SUBQ DAY: CPT

## 2025-02-07 PROCEDURE — 25000003 PHARM REV CODE 250: Performed by: NURSE PRACTITIONER

## 2025-02-07 PROCEDURE — 84295 ASSAY OF SERUM SODIUM: CPT | Performed by: SURGERY

## 2025-02-07 PROCEDURE — 99900035 HC TECH TIME PER 15 MIN (STAT)

## 2025-02-07 PROCEDURE — 97167 OT EVAL HIGH COMPLEX 60 MIN: CPT

## 2025-02-07 PROCEDURE — 85025 COMPLETE CBC W/AUTO DIFF WBC: CPT

## 2025-02-07 PROCEDURE — 63600175 PHARM REV CODE 636 W HCPCS: Performed by: NURSE PRACTITIONER

## 2025-02-07 PROCEDURE — 94761 N-INVAS EAR/PLS OXIMETRY MLT: CPT

## 2025-02-07 PROCEDURE — 36415 COLL VENOUS BLD VENIPUNCTURE: CPT | Performed by: SURGERY

## 2025-02-07 PROCEDURE — 94760 N-INVAS EAR/PLS OXIMETRY 1: CPT

## 2025-02-07 PROCEDURE — 20800000 HC ICU TRAUMA

## 2025-02-07 RX ORDER — AMANTADINE HYDROCHLORIDE 50 MG/5ML
100 SOLUTION ORAL 2 TIMES DAILY
Status: DISCONTINUED | OUTPATIENT
Start: 2025-02-07 | End: 2025-02-12

## 2025-02-07 RX ORDER — METOPROLOL TARTRATE 50 MG/1
50 TABLET ORAL 2 TIMES DAILY
Status: DISCONTINUED | OUTPATIENT
Start: 2025-02-07 | End: 2025-02-12

## 2025-02-07 RX ORDER — PRASUGREL 10 MG/1
10 TABLET, FILM COATED ORAL DAILY
Status: ON HOLD | COMMUNITY
End: 2025-02-21 | Stop reason: HOSPADM

## 2025-02-07 RX ORDER — AMLODIPINE BESYLATE 5 MG/1
5 TABLET ORAL DAILY
Status: DISCONTINUED | OUTPATIENT
Start: 2025-02-07 | End: 2025-02-08

## 2025-02-07 RX ORDER — METHYLPHENIDATE HYDROCHLORIDE 10 MG/1
10 TABLET ORAL 2 TIMES DAILY WITH MEALS
Status: DISCONTINUED | OUTPATIENT
Start: 2025-02-07 | End: 2025-02-12

## 2025-02-07 RX ORDER — LOSARTAN POTASSIUM 50 MG/1
100 TABLET ORAL NIGHTLY
Status: DISCONTINUED | OUTPATIENT
Start: 2025-02-07 | End: 2025-02-12

## 2025-02-07 RX ORDER — ENOXAPARIN SODIUM 100 MG/ML
40 INJECTION SUBCUTANEOUS EVERY 12 HOURS
Status: DISCONTINUED | OUTPATIENT
Start: 2025-02-07 | End: 2025-02-21 | Stop reason: HOSPADM

## 2025-02-07 RX ADMIN — THERA TABS 1 TABLET: TAB at 08:02

## 2025-02-07 RX ADMIN — HYDRALAZINE HYDROCHLORIDE 10 MG: 20 INJECTION INTRAMUSCULAR; INTRAVENOUS at 10:02

## 2025-02-07 RX ADMIN — MUPIROCIN: 20 OINTMENT TOPICAL at 08:02

## 2025-02-07 RX ADMIN — ENOXAPARIN SODIUM 40 MG: 40 INJECTION SUBCUTANEOUS at 08:02

## 2025-02-07 RX ADMIN — METHOCARBAMOL 500 MG: 500 TABLET ORAL at 02:02

## 2025-02-07 RX ADMIN — LABETALOL HYDROCHLORIDE 20 MG: 5 INJECTION, SOLUTION INTRAVENOUS at 09:02

## 2025-02-07 RX ADMIN — INSULIN ASPART 4 UNITS: 100 INJECTION, SOLUTION INTRAVENOUS; SUBCUTANEOUS at 08:02

## 2025-02-07 RX ADMIN — METOPROLOL TARTRATE 50 MG: 50 TABLET, FILM COATED ORAL at 08:02

## 2025-02-07 RX ADMIN — INSULIN ASPART 6 UNITS: 100 INJECTION, SOLUTION INTRAVENOUS; SUBCUTANEOUS at 12:02

## 2025-02-07 RX ADMIN — PROPRANOLOL HYDROCHLORIDE 40 MG: 20 TABLET ORAL at 08:02

## 2025-02-07 RX ADMIN — ACETAMINOPHEN 325MG 650 MG: 325 TABLET ORAL at 10:02

## 2025-02-07 RX ADMIN — FOLIC ACID 1 MG: 1 TABLET ORAL at 08:02

## 2025-02-07 RX ADMIN — INSULIN ASPART 6 UNITS: 100 INJECTION, SOLUTION INTRAVENOUS; SUBCUTANEOUS at 11:02

## 2025-02-07 RX ADMIN — LOSARTAN POTASSIUM 100 MG: 50 TABLET, FILM COATED ORAL at 02:02

## 2025-02-07 RX ADMIN — OXYCODONE HYDROCHLORIDE 5 MG: 5 TABLET ORAL at 11:02

## 2025-02-07 RX ADMIN — ACETAMINOPHEN 650 MG: 650 SOLUTION ORAL at 02:02

## 2025-02-07 RX ADMIN — METHOCARBAMOL 500 MG: 500 TABLET ORAL at 09:02

## 2025-02-07 RX ADMIN — INSULIN ASPART 4 UNITS: 100 INJECTION, SOLUTION INTRAVENOUS; SUBCUTANEOUS at 04:02

## 2025-02-07 RX ADMIN — INSULIN ASPART 6 UNITS: 100 INJECTION, SOLUTION INTRAVENOUS; SUBCUTANEOUS at 09:02

## 2025-02-07 RX ADMIN — FAMOTIDINE 20 MG: 20 TABLET, FILM COATED ORAL at 08:02

## 2025-02-07 RX ADMIN — METOPROLOL TARTRATE 50 MG: 50 TABLET, FILM COATED ORAL at 02:02

## 2025-02-07 RX ADMIN — LEVETIRACETAM 500 MG: 100 INJECTION, SOLUTION INTRAVENOUS at 08:02

## 2025-02-07 RX ADMIN — ENALAPRILAT 1.25 MG: 2.5 INJECTION INTRAVENOUS at 08:02

## 2025-02-07 RX ADMIN — HYDRALAZINE HYDROCHLORIDE 10 MG: 20 INJECTION INTRAMUSCULAR; INTRAVENOUS at 06:02

## 2025-02-07 RX ADMIN — LABETALOL HYDROCHLORIDE 20 MG: 5 INJECTION, SOLUTION INTRAVENOUS at 02:02

## 2025-02-07 RX ADMIN — INSULIN GLARGINE 10 UNITS: 100 INJECTION, SOLUTION SUBCUTANEOUS at 08:02

## 2025-02-07 RX ADMIN — AMLODIPINE BESYLATE 5 MG: 5 TABLET ORAL at 02:02

## 2025-02-07 RX ADMIN — ACETAMINOPHEN 650 MG: 650 SOLUTION ORAL at 09:02

## 2025-02-07 RX ADMIN — METHYLPHENIDATE HYDROCHLORIDE 10 MG: 5 TABLET ORAL at 08:02

## 2025-02-07 RX ADMIN — THIAMINE HCL TAB 100 MG 100 MG: 100 TAB at 08:02

## 2025-02-07 RX ADMIN — ACETAMINOPHEN 650 MG: 650 SOLUTION ORAL at 05:02

## 2025-02-07 RX ADMIN — METHOCARBAMOL 500 MG: 500 TABLET ORAL at 05:02

## 2025-02-07 RX ADMIN — AMANTADINE HYDROCHLORIDE 100 MG: 50 SOLUTION ORAL at 08:02

## 2025-02-07 RX ADMIN — ENALAPRILAT 1.25 MG: 2.5 INJECTION INTRAVENOUS at 12:02

## 2025-02-07 NOTE — PROGRESS NOTES
Inpatient Nutrition Assessment    Admit Date: 2/4/2025   Total duration of encounter: 3 days   Patient Age: 64 y.o.    Nutrition Recommendation/Prescription     Tube feeding recommendation:     Impact Peptide 1.5 goal rate 70 ml/hr to provide  2100 kcal/d  (97% est needs)  131 g protein/d (100% est needs)  1078 ml free water/d (50% est needs)  (calculations based on estimated 20 hr/d run time)     If no IV fluids running, can give 100ml q 2hr water flushes. Total water provided: 2078ml (119% est needs.)     Start @ 20ml/hr, increase as tolerated 20ml/hr q4hr until goal rate reached.      Communication of Recommendations: reviewed with nurse    Nutrition Assessment     Malnutrition Assessment/Nutrition-Focused Physical Exam       Malnutrition Level: other (see comments) (Does not meet criteria) (02/05/25 0931)  Energy Intake (Malnutrition): other (see comments) (Unable to assess) (02/05/25 0931)  Weight Loss (Malnutrition): other (see comments) (Unable to assess) (02/05/25 0931)                                         Fluid Accumulation (Malnutrition): other (see comments) (Not present) (02/05/25 0931)        A minimum of two characteristics is recommended for diagnosis of either severe or non-severe malnutrition.    Chart Review    Reason Seen: continuous nutrition monitoring and physician consult for Tf    Malnutrition Screening Tool Results   Have you recently lost weight without trying?: No  Have you been eating poorly because of a decreased appetite?: No   MST Score: 0   Diagnosis:  SDH, SAH, IPH  Left occipital bone fracture  Right 4th rib fracture    Relevant Medical History:   CAD, DM, HTN, HLD, alcoholism, anxiety, arthritis, MI     Scheduled Medications:  acetaminophen, 650 mg, Q4H  amantadine HCL, 100 mg, BID  docusate, 100 mg, BID  enoxparin, 40 mg, Q12H (prophylaxis, 0900/2100)  famotidine, 20 mg, BID  folic acid, 1 mg, Daily  insulin glargine U-100, 10 Units, QHS  levETIRAcetam (Keppra) IV (PEDS and  ADULTS), 500 mg, Q12H  methocarbamoL, 500 mg, Q8H  methylphenidate HCl, 10 mg, BID WM  multivitamin, 1 tablet, Daily  mupirocin, , BID  polyethylene glycol, 17 g, BID  propranoloL, 40 mg, TID  thiamine, 100 mg, Daily    Continuous Infusions:  0.9% NaCl 500 mL with sodium chloride (23.4%) HYPERTONIC 4 mEq/mL 232 mEq infusion, Last Rate: Stopped (02/07/25 0320)  D10W    PRN Medications:  acetaminophen, 650 mg, Q4H PRN  acetaminophen, 650 mg, Q4H PRN  bisacodyL, 10 mg, Daily PRN  D10W, , Continuous PRN  dextrose 50%, 12.5 g, PRN  dextrose 50%, 25 g, PRN  enalaprilat, 1.25 mg, Q4H PRN  glucagon (human recombinant), 1 mg, PRN  hydrALAZINE, 10 mg, Q4H PRN  insulin aspart U-100, 0-10 Units, Q4H PRN  labetaloL, 20 mg, Q4H PRN  LORazepam, 2 mg, Q4H PRN  melatonin, 6 mg, Nightly PRN  morphine, 2 mg, Q2H PRN  ondansetron, 4 mg, Q6H PRN  oxyCODONE, 5 mg, Q4H PRN  prochlorperazine, 5 mg, Q6H PRN  sodium chloride 0.9%, 10 mL, Q12H PRN    Calorie Containing IV Medications: no significant kcals from medications at this time    Recent Labs   Lab 02/04/25  0857 02/04/25  1333 02/04/25  1333 02/05/25  0401 02/05/25  1157 02/06/25  0130 02/06/25  0352 02/06/25  0654 02/06/25  1152 02/06/25  1858 02/06/25  2331 02/07/25  0222 02/07/25  0743    140   < > 141   < >  --  146* 147* 151* 152* 154* 156* 153*   K 4.6 3.5  --  3.3*  --   --  3.5  --   --   --   --  3.7  --    CALCIUM 8.7* 8.9  --  8.3*  --   --  8.2*  --   --   --   --  7.9*  --    CL 96* 103  --  107  --   --  115*  --   --   --   --  126*  --    CO2 22 21*  --  23  --   --  22*  --   --   --   --  22*  --    BUN 19 22.5  --  18.4  --   --  12.9  --   --   --   --  18.5  --    CREATININE 0.95 1.12  --  0.86  --   --  0.74  --   --   --   --  0.71*  --    EGFRNORACEVR  --  >60  --  >60  --   --  >60  --   --   --   --  >60  --    GLUCOSE  --  249*  --  247*  --   --  221*  --   --   --   --  160*  --    BILITOT  --   --   --  0.7  --   --  0.5  --   --   --   --  0.4  --   "  ALKPHOS  --   --   --  58  --   --  58  --   --   --   --  53  --    ALT  --   --   --  37  --   --  22  --   --   --   --  22  --    AST  --   --   --  83*  --   --  27  --   --   --   --  24  --    ALBUMIN  --   --   --  3.4  --   --  2.8*  --   --   --   --  2.5*  --    PREALB  --   --   --   --   --   --  11.5*  --   --   --   --   --   --    CRP  --   --   --   --   --   --  222.80*  --   --   --   --   --   --    WBC  --  12.17*  --  10.57  --  9.44  --   --   --   --   --  7.95  --    HGB  --  14.3  --  12.0*  --  10.7*  --   --   --   --   --  9.6*  --    HCT  --  40.3*  --  36.0*  --  33.2*  --   --   --   --   --  30.5*  --     < > = values in this interval not displayed.     Nutrition Orders:  Diet NPO  Tube Feedings/Formulas 70; 1,400; Impact Peptide 1.5; OG; 100; Every 2 hours    Appetite/Oral Intake: not applicable/not applicable  Factors Affecting Nutritional Intake: on mechanical ventilation  Social Needs Impacting Access to Food: unable to assess at this time; will attempt on follow-up  Food/Congregation/Cultural Preferences: unable to obtain  Food Allergies: no known food allergies  Last Bowel Movement: 02/06/25  Wound(s):  Incision documentation noted   I/O: Past 24 Hours: +1598, Since Admit: +3534     Comments    2/5/25: Discussed with RN. Will provide tube feeding recommendations for when appropriate to start tube feeding. Receiving kcal from meds.  Noted elevated Tmax, may need to update est needs upon F/U.     2/7/25: TF continues, tolerated per RN. Noted Na and Cl elevated. Purposefully elevated. Being managed by neurosurgery.    Anthropometrics    Height: 6' 0.01" (182.9 cm), Height Method: Estimated  Last Weight: 80 kg (176 lb 5.9 oz) (02/05/25 0400), Weight Method: Bed Scale  BMI (Calculated): 23.9  BMI Classification: normal (BMI 18.5-24.9)        Ideal Body Weight (IBW), Male: 178.06 lb     % Ideal Body Weight, Male (lb): 99.08 %                          Usual Weight Provided By: unable " to obtain usual weight    Wt Readings from Last 5 Encounters:   02/05/25 80 kg (176 lb 5.9 oz)     Weight Change(s) Since Admission:   Wt Readings from Last 1 Encounters:   02/05/25 0400 80 kg (176 lb 5.9 oz)   02/04/25 1234 80 kg (176 lb 5.9 oz)   Admit Weight: 80 kg (176 lb 5.9 oz) (02/04/25 1234), Weight Method: Estimated    Estimated Needs    Weight Used For Calorie Calculations: 80 kg (176 lb 5.9 oz)  Energy Calorie Requirements (kcal): 2158kcal  [facility] Need Method: [facility]  Weight Used For Protein Calculations: 80 kg (176 lb 5.9 oz)  Protein Requirements: 120-136gm (1.5-1.7g/kg)  Fluid Requirements (mL): 2158ml (1ml/kcal)  CHO Requirement: 245gm (45% est kcal needs)     Enteral Nutrition     Formula: Impact Peptide 1.5 Gus  Rate/Volume: 70ml/hr  Water Flushes: 100ml q2hr  Additives/Modulars: none at this time  Route: orogastric tube  Method: continuous  Total Nutrition Provided by Tube Feeding, Additives, and Flushes:  Calories Provided  2100 kcal/d, 97% needs   Protein Provided  131 g/d, 100% needs   Fluid Provided  2078 ml/d, 96% needs   Continuous feeding calculations based on estimated 20 hr/d run time unless otherwise stated.    Parenteral Nutrition     Patient not receiving parenteral nutrition support at this time.    Evaluation of Received Nutrient Intake    Calories: meeting estimated needs  Protein: meeting estimated needs    Patient Education     Not applicable.    Nutrition Diagnosis     PES: Inadequate oral intake related to acute illness as evidenced by intubation since admit. (active)     PES:            Nutrition Interventions     Intervention(s): collaboration with other providers  Intervention(s):      Goal: Meet greater than 80% of nutritional needs by follow-up. (goal progressing)  Goal: Tolerate enteral feeding at goal rate by follow-up. (goal progressing)    Nutrition Goals & Monitoring     Dietitian will monitor: energy intake and enteral nutrition intake  Discharge planning: too  early to determine; pending clinical course  Nutrition Risk/Follow-Up: high (follow-up in 1-4 days)   Please consult if re-assessment needed sooner.

## 2025-02-07 NOTE — PLAN OF CARE
Problem: Occupational Therapy  Goal: Occupational Therapy Goal  Description: LTG: Pt will perform basic ADLs and ADL t/fs with min A using LRAD by d/c.    STG: to be met by 3/7/25  1. Pt will follow 100% of simple one step commands across 3 sessions  2. Pt will perform grooming EOB with min A.   3. Pt will perform functional grasp/reach/release of items >80% of trials in prep for increased participation in ADL tasks.   4. Pt will perform sit<>stand with mod A in prep for ADL t/fs.  5. Pt will perform bedside commode transfer with mod A.    Outcome: Progressing

## 2025-02-07 NOTE — PT/OT/SLP EVAL
Occupational Therapy  Evaluation    Name: Ezequiel Ramires  MRN: [id number]    Recommendations:     Discharge therapy intensity: High Intensity Therapy   Discharge Equipment Recommendations:   (TBD as patient progresses)  Barriers to discharge:   (ongoing medical needs, severity of deficits)    Assessment:     Ezequiel Ramires is a 64 y.o. male with a medical diagnosis of B SDH s/p R craniectomy, SAH, IPH, L occipital bone fx, R 4th rib fx. Patient was found down with presumed fall. Patient presents on ventilator, orally intubated. He presents with the following performance deficits affecting function: weakness, impaired endurance, impaired sensation, impaired self care skills, impaired functional mobility, impaired balance, visual deficits, impaired cognition, decreased coordination, decreased upper extremity function, decreased lower extremity function, decreased safety awareness, decreased ROM. Patient participative during eval, though minimally. Tolerated sitting EOB ~8mins prior to coughing fit with numerous secretions - RR @ 25-32 bpm. While EOB, patient with R gaze fixation, wiggling B toes (R>L), kicked RLE, no movement noted in BUEs. Will continue to progress patient as able. Recommending high intensity therapy in a neuro rehab setting.    Rehab Prognosis: Good; patient would benefit from acute skilled OT services to address these deficits and reach maximum level of function.       Plan:     Patient to be seen 5 x/week to address the above listed problems via self-care/home management, therapeutic activities, therapeutic exercises, neuromuscular re-education  Plan of Care Expires: 03/07/25  Plan of Care Reviewed with: patient    Subjective     Chief Complaint: none stated  Patient/Family Comments/goals: none stated    Occupational Profile:  Living Environment: resides with wife, [facility]  Previous level of function: ind  Roles and Routines:   Equipment Used at Home:  (has walker does not use)  Assistance upon  Discharge: TBD  *per CM notes    Pain/Comfort:  Pain Rating 1:  (unable to state)    Patients cultural, spiritual, Christian conflicts given the current situation: no    Objective:     OT communicated with NSG prior to session.      Patient was found HOB elevated with arterial line, blood pressure cuff, PureWick, peripheral IV, pulse ox (continuous), telemetry, ventilator, RORO drain, NG tube upon OT entry to room.    General Precautions: Standard, fall (SBP < 140, R bone flap pxs)  Orthopedic Precautions: N/A  Braces:  (helmet)  Ventilator SIMV, 30% FiO2, PEEP5  Vital Signs: 153/88 98% 80 bpm    Bed Mobility:    Patient completed Supine to Sit with maximal assistance and 2 persons  Patient completed Sit to Supine with maximal assistance and 2 persons  Sitting EOB with maxA, L lean present; no righting reactions noted    Functional Mobility/Transfers:  Not safe at this time    Activities of Daily Living:  Grooming: total assistance for suctioning and wiping secretions    AMPAC 6 Click ADL:  AMPAC Total Score: 6    Functional Cognition:  Intermittently following commands, no attempts at communication    Visual Perceptual Skills:  R gaze fixation at rest, not noted to track; opened B eyes to command; difficult to assess startle    Upper Extremity Function:  No active movement noted; no withdrawal to noxious stim      Therapeutic Positioning  Risk for acquired pressure injuries is increased due to relative decrease in mobility d/t hospitalization , impaired mobility, impaired sensation, and inability to communicate toileting needs.    OT interventions performed during the course of today's session:   Education was provided on benefits of and recommendations for therapeutic positioning  Therapeutic positioning was provided at the conclusion of session to offload all bony prominences for the prevention and/or reduction of pressure injuries    Skin assessment: all bony prominences were assessed    Findings: known area of  altered skin integrity at surgical incision    OT recommendations for therapeutic positioning throughout hospitalization:   Follow [facility] Pressure Injury Prevention Protocol  Geomat recommended for sacral protection while [facility]  [employer] - watch for needs    Patient Education:  Patient provided with verbal education education regarding OT role/goals/POC, fall prevention, safety awareness, Discharge/DME recommendations, and pressure ulcer prevention.   Unable to return understanding/demonstration      Patient left with bed in chair position with all lines intact, call button in reach, wedge under R side, and RN present.    GOALS:   Multidisciplinary Problems       Occupational Therapy Goals          Problem: Occupational Therapy    Goal Priority Disciplines Outcome Interventions   Occupational Therapy Goal     OT, PT/OT Progressing    Description: LTG: Pt will perform basic ADLs and ADL t/fs with min A using LRAD by d/c.    STG: to be met by 3/7/25  1. Pt will follow 100% of simple one step commands across 3 sessions  2. Pt will perform grooming EOB with min A.   3. Pt will perform functional grasp/reach/release of items >80% of trials in prep for increased participation in ADL tasks.   4. Pt will perform sit<>stand with mod A in prep for ADL t/fs.  5. Pt will perform bedside commode transfer with mod A.                         History:     Past Medical History:   Diagnosis Date    CAD (coronary artery disease)     DMII (diabetes mellitus, type 2)     HLD (hyperlipidemia)     HTN (hypertension)     Psoriasis     ST elevation (STEMI) myocardial infarction of unspecified site          Past Surgical History:   Procedure Laterality Date    CRANIOTOMY FOR EVACUATION OF SUBDURAL HEMATOMA Right 2/4/2025    Procedure: CRANIOTOMY, FOR SUBDURAL HEMATOMA EVACUATION;  Surgeon: Samir Garcia MD;  Location: [facility];  Service: Neurosurgery;  Laterality: Right;       Time Tracking:     OT Date of Treatment:    OT Start Time:  1347  OT Stop Time: 1415  OT Total Time (min): 28 min    Billable Minutes:Evaluation high    2/7/2025

## 2025-02-07 NOTE — PLAN OF CARE
Problem: Physical Therapy  Goal: Physical Therapy Goal  Description: Goals to be met by: d/c     Patient will increase functional independence with mobility by performin. Supine to sit with MInimal Assistance  2. Sit to supine with MInimal Assistance  3. Sit to stand transfer with Minimal Assistance  4. Bed to chair transfer with Minimal Assistance using LRAD vs pivot  5. Gait to be assessed as appropriate    Outcome: Progressing

## 2025-02-07 NOTE — PT/OT/SLP EVAL
Physical Therapy Evaluation    Patient Name:  Ezequiel Ramires   MRN:  [id number]    Recommendations:     Discharge therapy intensity: High Intensity Therapy (in a neuro setting)   Discharge Equipment Recommendations:  (TBD)   Barriers to discharge: Decreased caregiver support, Impaired mobility, and Ongoing medical needs    Assessment:     Ezequiel Ramires is a 64 y.o. male admitted with a medical diagnosis of B SDH s/p R craniectomy, SAH, IPH, L occipital bone fx, R 4th rib fx. Patient was found down with presumed fall. Patient presents on ventilator, orally intubated.  He presents with the following impairments/functional limitations: weakness, impaired self care skills, impaired functional mobility, decreased lower extremity function, impaired endurance, impaired balance. Pt tolerated sitting EOB x 8 minutes with MaxA, fair head control. Pt with increased coughing over the vent towards the end of session requiring return to supine. Pt did not demonstrate any UE movement to command, was able to wiggle toes B, and kick RLE to command x 2. Pt opened eyes to name calling on every attempt, unable to track appropriately. R gaze preference noted. Continue to progress as pt can tolerate.     Rehab Prognosis: Good; patient would benefit from acute skilled PT services to address these deficits and reach maximum level of function.    Recent Surgery: Procedure(s) (LRB):  CRANIOTOMY, FOR SUBDURAL HEMATOMA EVACUATION (Right) 3 Days Post-Op    Plan:     During this hospitalization, patient would benefit from acute PT services 6 x/week to address the identified rehab impairments via therapeutic activities, therapeutic exercises and progress toward the following goals:    Plan of Care Expires:  03/08/25    Subjective     Chief Complaint: unable to verbalize  Patient/Family Comments/goals: unable to verbalize  Pain/Comfort:  Pain Rating 1:  (unable to verbalize)    Patients cultural, spiritual, Rastafari conflicts given the current situation:       Living Environment:  Pt unable to verbalize  Prior to admission, patients level of function was TBD.  Equipment used at home:  (unable to determine).  DME owned (not currently used):  TBD .  Upon discharge, patient will have assistance from TBD.    Objective:     Communicated with RN/MD prior to session.  Patient found HOB elevated with arterial line, blood pressure cuff, peripheral IV, PureWick, pulse ox (continuous), telemetry, ventilator, RORO drain, NG tube, SCD  upon PT entry to room.    General Precautions: Standard, fall (<140, R bone flap pxn)  Orthopedic Precautions:N/A   Braces:  (helmet)  Respiratory Status: Ventilator SIMV, PEEP 5, FI02 30%  Blood Pressure: 153/80 sitting EOB       Exams:  RLE Strength: unable to accurately assess 2/2 cognition, able to wiggle toes and perform LAQ  LLE Strength: unable to accurately assess 2/2 cognition, able to wiggle toes  Skin integrity:  swelling noted to head and R eye      Functional Mobility:  Bed Mobility:     Supine to Sit: total assistance and of 2 persons  Sit to Supine: total assistance and of 2 persons  Balance: poor static sitting balance at EOB, fair head control noted at EOB. Increased coughing/secretions, eventually requiring return to supine.       AM-PAC 6 CLICK MOBILITY  Total Score:8       Treatment & Education:    Patient provided with verbal education education regarding PT role/goals/POC, fall prevention, and safety awareness.  Additional teaching is warranted.     Patient left with bed in chair position with all lines intact, call button in reach, and pressure relief boots.    GOALS:   Multidisciplinary Problems       Physical Therapy Goals          Problem: Physical Therapy    Goal Priority Disciplines Outcome Interventions   Physical Therapy Goal     PT, PT/OT Progressing    Description: Goals to be met by: d/c     Patient will increase functional independence with mobility by performin. Supine to sit with MInimal Assistance  2. Sit  to supine with MInimal Assistance  3. Sit to stand transfer with Minimal Assistance  4. Bed to chair transfer with Minimal Assistance using LRAD vs pivot  5. Gait to be assessed as appropriate                         History:     Past Medical History:   Diagnosis Date    CAD (coronary artery disease)     DMII (diabetes mellitus, type 2)     HLD (hyperlipidemia)     HTN (hypertension)     Psoriasis     ST elevation (STEMI) myocardial infarction of unspecified site        Past Surgical History:   Procedure Laterality Date    CRANIOTOMY FOR EVACUATION OF SUBDURAL HEMATOMA Right 2/4/2025    Procedure: CRANIOTOMY, FOR SUBDURAL HEMATOMA EVACUATION;  Surgeon: Samir Garcia MD;  Location: [facility];  Service: Neurosurgery;  Laterality: Right;       Time Tracking:     PT Received On: 02/07/25  PT Start Time: 1347     PT Stop Time: 1411  PT Total Time (min): 24 min     Billable Minutes: Evaluation 24      02/07/2025

## 2025-02-07 NOTE — PROGRESS NOTES
Trauma ICU Progress Note    Patient Information:   Patient Name: Ezequiel Ramires                   : 1960     MRN: [id number]   Date of Admission: 2025  Code Status: Full Code  Date of Exam: 2025  HD#3  POD#3 Days Post-Op  Attending Provider: Maik Moreno Jr., *  Admission Summary:   Patient is a 64 year old  male with PMH of CAD, HTN, DM, HLD, alcoholism, arthritis, anxiety, MI who presents as a transfer from [facility] with bilateral SDH with 0.8 right to left midline shift, SAH, left occipital fracture, right 4th rib fracture. Report per wife patient was last seen normal around 1930 yesterday, she woke this morning to find him on the ground next to the bed with vomit on the floor, he was taken to the OSH at around 0500     Interval history:    Patient continues to be off sedation. He did open his eyes this morning and moved his toes for me.    Consults:   Consults: Neurosurgery Injuries:  1. Acute nondisplaced R 4th rib fracture  2. Tree-in-bud nodularities in the bilateral lower lobes which could be due to infectious bronchiolitis or aspiration   3. 4 mm anterolisthesis of C4 on C5 due to degeneration versus trauma  4. Nondisplaced left occipital bone fracture extending inferiorly to the level of the foramen magnum   5. Bilateral SDH  6. Bilateral IPH  7. Bilateral SAH  8. Small layering hemorrhage in the left occipital horn   9. 8 mm right to left midline shift    [x]Problems list reviewed  [x]Tertiary exam performed Operations/Procedures:  Crani 25     Past medical history:  HTN  HLD  DMII  Psoriasis  CAD s/p stents  MI    Medications: [x] Medications reviewed/updated   Home Meds:    Current Outpatient Medications   Medication Instructions    amLODIPine (NORVASC) 5 MG tablet 1 tablet, Daily    aspirin (ECOTRIN) 81 MG EC tablet 1 tablet, Oral, Every morning    ezetimibe (ZETIA) 10 mg tablet 1 tablet, Daily    icosapent ethyL (VASCEPA) 2,000 mg, Nightly    losartan (COZAAR) 100 MG tablet 1  tablet, Nightly    metoprolol tartrate (LOPRESSOR) 50 MG tablet 1 tablet, 2 times daily    rosuvastatin (CRESTOR) 10 MG tablet 1 tablet, Nightly      Scheduled Meds:    acetaminophen  650 mg Per OG tube Q4H    amantadine HCL  100 mg Per NG tube BID    docusate  100 mg Per G Tube BID    enoxparin  40 mg Subcutaneous Q12H (prophylaxis, 0900/2100)    famotidine  20 mg Per OG tube BID    folic acid  1 mg Per OG tube Daily    insulin glargine U-100  10 Units Subcutaneous QHS    levETIRAcetam (Keppra) IV (PEDS and ADULTS)  500 mg Intravenous Q12H    methocarbamoL  500 mg Per OG tube Q8H    methylphenidate HCl  10 mg Per OG tube BID WM    multivitamin  1 tablet Per OG tube Daily    mupirocin   Nasal BID    polyethylene glycol  17 g Per OG tube BID    propranoloL  40 mg Per OG tube TID    thiamine  100 mg Per OG tube Daily     Continuous Infusions:    0.9% NaCl 500 mL with sodium chloride (23.4%) HYPERTONIC 4 mEq/mL 232 mEq infusion   Intravenous Continuous   Stopped at 02/07/25 0320    D10W   Intravenous Continuous PRN         PRN Meds:   Current Facility-Administered Medications:     acetaminophen, 650 mg, Rectal, Q4H PRN    acetaminophen, 650 mg, Oral, Q4H PRN    bisacodyL, 10 mg, Rectal, Daily PRN    D10W, , Intravenous, Continuous PRN    dextrose 50%, 12.5 g, Intravenous, PRN    dextrose 50%, 25 g, Intravenous, PRN    enalaprilat, 1.25 mg, Intravenous, Q4H PRN    glucagon (human recombinant), 1 mg, Intramuscular, PRN    hydrALAZINE, 10 mg, Intravenous, Q4H PRN    insulin aspart U-100, 0-10 Units, Subcutaneous, Q4H PRN    labetaloL, 20 mg, Intravenous, Q4H PRN    LORazepam, 2 mg, Oral, Q4H PRN    melatonin, 6 mg, Oral, Nightly PRN    morphine, 2 mg, Intravenous, Q2H PRN    ondansetron, 4 mg, Intravenous, Q6H PRN    oxyCODONE, 5 mg, Oral, Q4H PRN    prochlorperazine, 5 mg, Intravenous, Q6H PRN    Flushing PICC/Midline Protocol, , , Until Discontinued **AND** sodium chloride 0.9%, 10 mL, Intravenous, Q12H PRN  "    Vitals:  VITAL SIGNS: 24 HR MIN & MAX LAST   Temp  Min: 99.1 °F (37.3 °C)  Max: 102.1 °F (38.9 °C)  99.3 °F (37.4 °C)   BP  Min: 100/61  Max: 156/82  133/74    Pulse  Min: 65  Max: 103  69    Resp  Min: 16  Max: 33  18    SpO2  Min: 95 %  Max: 100 %  100 %      HT: 6' 0.01" (182.9 cm)  WT: 80 kg (176 lb 5.9 oz)  BMI: 23.9   Ideal Body Weight (IBW), Male: 178.06 lb  % Ideal Body Weight, Male (lb): 99.08 %        General  Exam: intubated      Neuro/Psych  GCS: 10T (E 3) (V T) (M 6)  Exam: Patient is following commands moving his toes for me and opening his eyes to command. Crani incision c/d/I Corbin drain +SS drainage  ICP monitor: No  ICP treatment: ICP Treatment: 3% saline   C-Collar: No    Plan:   Bilateral SDH s/p crani- Continue 3% saline with Na goal of 150-155, Keppra x 7 days, HOB > 30 degrees. BP<140. Will start amantadine and ritalin     HEENT  Exam: NCAT ETT/OGT in place    Plan:   monitoring     Pulmonary  Vitals: Resp  Av.7  Min: 16  Max: 33  SpO2  Av.8 %  Min: 95 %  Max: 100 %    Ventilator/Oxygen Settings:   Vent Mode: SIMV  Vt Set: 500 mL  Set Rate: 18 BPM  Pressure Support: 10 cmH20  I:E Ratio Measured: 1:2.2  Total PEEP: 5 cmH20 Vent Mode: SIMV (25)  Ventilator Initiated: Yes (25 1240)  Set Rate: 18 BPM (25)  Vt Set: 500 mL (25)  Pressure Support: 10 cmH20 (25)  PEEP/CPAP: 5 cmH20 (25)  Oxygen Concentration (%): 30 (25)  Peak Airway Pressure: 15 cmH20 (25)  Total Ve: 11.7 L/m (25)  F/VT Ratio<105 (RSBI): (!) 32.73 (25)        ABG:   Recent Labs   Lab 25   PH 7.450   PO2 110.0*   PCO2 36.0   HCO3 25.0        CXR:    X-Ray Chest 1 View for Line/Tube Placement    Result Date: 2025  PICC line insertion. Confluent opacities in the left retrocardiac region with silhouetting of the left hemidiaphragm which might be related to an infiltrate/atelectasis. No other change " "Electronically signed by: Serg Mera Date:    2025 Time:    11:33        Rib fractures: right rib fracture  Chest Tube: None     Exam: Coarse BS bilaterally on minimal vent settings     Plan:     Remaining intubated due to poor mental status  Incentive Spirometry/RT Treatments: none     Cardiovascular  Vitals: Pulse  Av.2  Min: 65  Max: 103  BP  Min: 100/61  Max: 156/82  No results for input(s): "TROPONINI", "CKTOTAL", "CKMB", "BNP" in the last 168 hours.  Vasoactive Agents: None  Exam: RRR    Plan:   HTN- prns     Renal  Recent Labs     25  1333 25  0401 25  0352 25  0222   BUN 22.5 18.4 12.9 18.5   CREATININE 1.12 0.86 0.74 0.71*       No results for input(s): "LACTIC" in the last 72 hours.    Intake/Output - Last 3 Shifts          0659  0659  07 0659    I.V. (mL/kg) 1224.3 (15.3) 1059.2 (13.2)     NG/ 2348     IV Piggyback 378.6 220.5     Total Intake(mL/kg) 2182.9 (27.3) 3627.6 (45.3)     Urine (mL/kg/hr) 1465 (0.8) 1925 (1)     Drains 40 105     Stool  0     Total Output 1505 2030     Net +677.9 +1597.6            Stool Occurrence  3 x              Intake/Output Summary (Last 24 hours) at 2025 1032  Last data filed at 2025 0600  Gross per 24 hour   Intake 3329.12 ml   Output 1655 ml   Net 1674.12 ml         Garcia: Yes     Plan:   Discontinue Garcia     FEN/GI  Recent Labs     25  1333 25  0401 25  1157 25  0352 25  0654 25  1858 25  2331 25  0222 25  0743    141   < > 146*   < > 152* 154* 156* 153*   K 3.5 3.3*  --  3.5  --   --   --  3.7  --     107  --  115*  --   --   --  126*  --    CO2 21* 23  --  22*  --   --   --  22*  --    CALCIUM 8.9 8.3*  --  8.2*  --   --   --  7.9*  --    ALBUMIN  --  3.4  --  2.8*  --   --   --  2.5*  --    BILITOT  --  0.7  --  0.5  --   --   --  0.4  --    AST  --  83*  --  27  --   --   --  24  --    ALKPHOS  -- "  58  --  58  --   --   --  53  --    ALT  --  37  --  22  --   --   --  22  --     < > = values in this interval not displayed.       Diet: NPO tube feeds    Last BM: none    Abdominal Exam: s/NT/ND +BS    Plan:   Tube feeds at goal   Bowel regimen     Heme/Onc  Recent Labs     25  1333 25  0401 25  0130 25  0222   HGB 14.3 12.0* 10.7* 9.6*   HCT 40.3* 36.0* 33.2* 30.5*    208 173 176   INR 1.2  --   --   --        Transfusions (over past 24h): None    Plan:   monitor     ID  Temp  Av.2 °F (37.9 °C)  Min: 99.1 °F (37.3 °C)  Max: 102.1 °F (38.9 °C)      Recent Labs     25  1333 25  0401 25  0130 25  0222   WBC 12.17* 10.57 9.44 7.95       Cultures: Antibiotics:    none 1. none     Plan:   monitor     Endocrine  Recent Labs     25  1333 25  0401 25  0352 25  0222   GLUCOSE 249* 247* 221* 160*      Recent Labs     25  1746 25  2359 25  1126 25  1547 25  2013 25  2222 25  2335 25  0358   POCTGLUCOSE 265* 219* 273* 269* 258* 257* 204* 172*        Plan:   DMII-continue moderate sliding scale  Insulin treatment: low SS     Musculoskeletal  Weight bearing status:   RUE: WBAT  LUE: WBAT  RLE: WBAT  LLE: WBAT    Exam: does not have purposeful movement  Plan:   monitor     Wounds  Wounds exam: crani incision c/d/i  Wound vac: No   Media: none  Plan: local wound care     Precautions  Precautions: Seizure     Prophylaxis  Seizure: Keppra (day 3/7)  DVT: Holding lovenox    GI: H2B     Lines/drains/airway   Lines/Drains/Airways       Peripherally Inserted Central Catheter Line  Duration             PICC Triple Lumen 25 1106 right basilic 1 day              Drain  Duration                  NG/OG Tube 25 orogastric Center mouth 3 days         Closed/Suction Drain 25 1600 Tube - 1 Posterior;Right;Superior Other (Comment) Bulb 10 Fr. 2 days    Male External Urinary Catheter 25  2300 Small <1 day              Airway  Duration                  Airway - Non-Surgical 02/04/25 1200 Endotracheal Tube 2 days              Peripheral Intravenous Line  Duration                  Peripheral IV - Single Lumen 02/04/25 1322 18 G Left Antecubital 2 days         Peripheral IV - Single Lumen 02/04/25 1730 20 G;18 G Right Antecubital 2 days                    Plan:  Cont all lines    [x]LDA reviewed/updated      Restraints  Face to face evaluation of need for restraints on rounds today:   Currently restrained? No.        Assessment & Disposition:   Problem list:  Active Problem List with Overview Notes    Diagnosis Date Noted    Intraparenchymal hemorrhage of brain 02/05/2025    IVH (intraventricular hemorrhage) 02/05/2025    SDH (subdural hematoma) 02/04/2025    SAH (subarachnoid hemorrhage) 02/04/2025    Closed fracture of one rib of right side 02/04/2025    Closed fracture of left side of occipital bone 02/04/2025       Guarded. Poor prognosis.  Bilateral SDH s/p crani- Continue 3% saline with Na goal of 150-155, Keppra x 7 days, HOB > 30 degrees. BP<140. Will start amantadine and ritalin  Remaining intubated due to poor mental status  HTN- prns  Continue TF  Lovenox  PT/OT  DMII- will advance to moderate sliding scale  Discontinue alvarado     Critical Care Time:   42 minutes of critical care was spent on this patient personally by me on the following activities: development of treatment plan with patient and bedside nurse, discussions with consultants, evaluation of patient's response to treatment, examining the patient, ordering and preforming treatments and interventions, ordering and reviewing laboratory studies, ordering and reviewing radiologic studies, and re-evaluation of patient's condition.     Maik Moreno Jr, MD, MS  Trauma Critical Care Surgery  [facility]   02/07/2025

## 2025-02-08 LAB
ALBUMIN SERPL-MCNC: 2.5 G/DL (ref 3.4–4.8)
ALBUMIN/GLOB SERPL: 0.7 RATIO (ref 1.1–2)
ALLENS TEST BLOOD GAS (OHS): ABNORMAL
ALP SERPL-CCNC: 64 UNIT/L (ref 40–150)
ALT SERPL-CCNC: 38 UNIT/L (ref 0–55)
ANION GAP SERPL CALC-SCNC: 8 MEQ/L
AST SERPL-CCNC: 38 UNIT/L (ref 5–34)
BASE EXCESS BLD CALC-SCNC: 0.6 MMOL/L (ref -2–2)
BASOPHILS # BLD AUTO: 0.05 X10(3)/MCL
BASOPHILS NFR BLD AUTO: 0.6 %
BILIRUB SERPL-MCNC: 0.3 MG/DL
BLOOD GAS SAMPLE TYPE (OHS): ABNORMAL
BUN SERPL-MCNC: 19.4 MG/DL (ref 8.4–25.7)
CA-I BLD-SCNC: 1.14 MMOL/L (ref 1.12–1.23)
CALCIUM SERPL-MCNC: 8.2 MG/DL (ref 8.8–10)
CHLORIDE SERPL-SCNC: 125 MMOL/L (ref 98–107)
CO2 BLDA-SCNC: 25.6 MMOL/L
CO2 SERPL-SCNC: 23 MMOL/L (ref 23–31)
COHGB MFR BLDA: 2.4 % (ref 0.5–1.5)
CREAT SERPL-MCNC: 0.71 MG/DL (ref 0.72–1.25)
CREAT/UREA NIT SERPL: 27
DRAWN BY BLOOD GAS (OHS): ABNORMAL
EOSINOPHIL # BLD AUTO: 0.06 X10(3)/MCL (ref 0–0.9)
EOSINOPHIL NFR BLD AUTO: 0.8 %
ERYTHROCYTE [DISTWIDTH] IN BLOOD BY AUTOMATED COUNT: 14.6 % (ref 11.5–17)
GFR SERPLBLD CREATININE-BSD FMLA CKD-EPI: >60 ML/MIN/1.73/M2
GLOBULIN SER-MCNC: 3.5 GM/DL (ref 2.4–3.5)
GLUCOSE SERPL-MCNC: 163 MG/DL (ref 82–115)
GROUP & RH: NORMAL
HCO3 BLDA-SCNC: 24.5 MMOL/L (ref 22–26)
HCT VFR BLD AUTO: 32.5 % (ref 42–52)
HGB BLD-MCNC: 10.1 G/DL (ref 14–18)
IMM GRANULOCYTES # BLD AUTO: 0.04 X10(3)/MCL (ref 0–0.04)
IMM GRANULOCYTES NFR BLD AUTO: 0.5 %
INDIRECT COOMBS: NORMAL
INHALED O2 CONCENTRATION: 30 %
LYMPHOCYTES # BLD AUTO: 2.04 X10(3)/MCL (ref 0.6–4.6)
LYMPHOCYTES NFR BLD AUTO: 26.1 %
MCH RBC QN AUTO: 29.4 PG (ref 27–31)
MCHC RBC AUTO-ENTMCNC: 31.1 G/DL (ref 33–36)
MCV RBC AUTO: 94.8 FL (ref 80–94)
MECH RR (OHS): 18 B/MIN
METHGB MFR BLDA: 0.9 % (ref 0.4–1.5)
MODE (OHS): ABNORMAL
MONOCYTES # BLD AUTO: 0.82 X10(3)/MCL (ref 0.1–1.3)
MONOCYTES NFR BLD AUTO: 10.5 %
NEUTROPHILS # BLD AUTO: 4.8 X10(3)/MCL (ref 2.1–9.2)
NEUTROPHILS NFR BLD AUTO: 61.5 %
NRBC BLD AUTO-RTO: 0 %
O2 HB BLOOD GAS (OHS): 95.5 % (ref 94–97)
OXYGEN DEVICE BLOOD GAS (OHS): ABNORMAL
OXYHGB MFR BLDA: 11.6 G/DL (ref 12–16)
PCO2 BLDA: 36 MMHG (ref 35–45)
PEEP RESPIRATORY: 5 CMH2O
PH BLDA: 7.44 [PH] (ref 7.35–7.45)
PLATELET # BLD AUTO: 211 X10(3)/MCL (ref 130–400)
PMV BLD AUTO: 10.4 FL (ref 7.4–10.4)
PO2 BLDA: 79 MMHG (ref 80–100)
POCT GLUCOSE: 215 MG/DL (ref 70–110)
POCT GLUCOSE: 242 MG/DL (ref 70–110)
POCT GLUCOSE: 252 MG/DL (ref 70–110)
POCT GLUCOSE: 256 MG/DL (ref 70–110)
POCT GLUCOSE: 266 MG/DL (ref 70–110)
POTASSIUM BLOOD GAS (OHS): 3.3 MMOL/L (ref 3.5–5)
POTASSIUM SERPL-SCNC: 3.3 MMOL/L (ref 3.5–5.1)
PROT SERPL-MCNC: 6 GM/DL (ref 5.8–7.6)
PS (OHS): 10 CMH2O
RBC # BLD AUTO: 3.43 X10(6)/MCL (ref 4.7–6.1)
SAMPLE SITE BLOOD GAS (OHS): ABNORMAL
SAO2 % BLDA: 96 %
SODIUM BLOOD GAS (OHS): 153 MMOL/L (ref 137–145)
SODIUM SERPL-SCNC: 152 MMOL/L (ref 136–145)
SODIUM SERPL-SCNC: 152 MMOL/L (ref 136–145)
SODIUM SERPL-SCNC: 154 MMOL/L (ref 136–145)
SODIUM SERPL-SCNC: 156 MMOL/L (ref 136–145)
SODIUM SERPL-SCNC: 156 MMOL/L (ref 136–145)
SPECIMEN OUTDATE: NORMAL
SPONT+MECH VT ON VENT: 500 ML
WBC # BLD AUTO: 7.81 X10(3)/MCL (ref 4.5–11.5)

## 2025-02-08 PROCEDURE — 36415 COLL VENOUS BLD VENIPUNCTURE: CPT | Performed by: SURGERY

## 2025-02-08 PROCEDURE — 97535 SELF CARE MNGMENT TRAINING: CPT | Mod: CO

## 2025-02-08 PROCEDURE — 63600175 PHARM REV CODE 636 W HCPCS: Performed by: PHYSICIAN ASSISTANT

## 2025-02-08 PROCEDURE — 84295 ASSAY OF SERUM SODIUM: CPT | Performed by: SURGERY

## 2025-02-08 PROCEDURE — 27200966 HC CLOSED SUCTION SYSTEM

## 2025-02-08 PROCEDURE — 63600175 PHARM REV CODE 636 W HCPCS: Performed by: SURGERY

## 2025-02-08 PROCEDURE — 25000003 PHARM REV CODE 250

## 2025-02-08 PROCEDURE — 27100171 HC OXYGEN HIGH FLOW UP TO 24 HOURS

## 2025-02-08 PROCEDURE — 63600175 PHARM REV CODE 636 W HCPCS: Performed by: NURSE PRACTITIONER

## 2025-02-08 PROCEDURE — 94761 N-INVAS EAR/PLS OXIMETRY MLT: CPT | Mod: XB

## 2025-02-08 PROCEDURE — 63600175 PHARM REV CODE 636 W HCPCS

## 2025-02-08 PROCEDURE — 85025 COMPLETE CBC W/AUTO DIFF WBC: CPT

## 2025-02-08 PROCEDURE — 97112 NEUROMUSCULAR REEDUCATION: CPT | Mod: CO

## 2025-02-08 PROCEDURE — 20800000 HC ICU TRAUMA

## 2025-02-08 PROCEDURE — 99900035 HC TECH TIME PER 15 MIN (STAT)

## 2025-02-08 PROCEDURE — 97530 THERAPEUTIC ACTIVITIES: CPT | Mod: CQ

## 2025-02-08 PROCEDURE — 25000003 PHARM REV CODE 250: Performed by: NURSE PRACTITIONER

## 2025-02-08 PROCEDURE — 99900026 HC AIRWAY MAINTENANCE (STAT)

## 2025-02-08 PROCEDURE — 82803 BLOOD GASES ANY COMBINATION: CPT

## 2025-02-08 PROCEDURE — 86900 BLOOD TYPING SEROLOGIC ABO: CPT | Performed by: SURGERY

## 2025-02-08 PROCEDURE — 99900031 HC PATIENT EDUCATION (STAT)

## 2025-02-08 PROCEDURE — 25000003 PHARM REV CODE 250: Performed by: PHYSICIAN ASSISTANT

## 2025-02-08 PROCEDURE — 80053 COMPREHEN METABOLIC PANEL: CPT

## 2025-02-08 PROCEDURE — 94003 VENT MGMT INPAT SUBQ DAY: CPT

## 2025-02-08 PROCEDURE — 25000003 PHARM REV CODE 250: Performed by: SURGERY

## 2025-02-08 PROCEDURE — 99291 CRITICAL CARE FIRST HOUR: CPT | Mod: ,,, | Performed by: SURGERY

## 2025-02-08 PROCEDURE — 94760 N-INVAS EAR/PLS OXIMETRY 1: CPT | Mod: XB

## 2025-02-08 PROCEDURE — 37799 UNLISTED PX VASCULAR SURGERY: CPT

## 2025-02-08 RX ORDER — AMLODIPINE BESYLATE 5 MG/1
10 TABLET ORAL DAILY
Status: DISCONTINUED | OUTPATIENT
Start: 2025-02-09 | End: 2025-02-12

## 2025-02-08 RX ORDER — INSULIN GLARGINE 100 [IU]/ML
10 INJECTION, SOLUTION SUBCUTANEOUS 2 TIMES DAILY
Status: DISCONTINUED | OUTPATIENT
Start: 2025-02-08 | End: 2025-02-09

## 2025-02-08 RX ADMIN — METHOCARBAMOL 500 MG: 500 TABLET ORAL at 02:02

## 2025-02-08 RX ADMIN — FOLIC ACID 1 MG: 1 TABLET ORAL at 09:02

## 2025-02-08 RX ADMIN — THIAMINE HCL TAB 100 MG 100 MG: 100 TAB at 09:02

## 2025-02-08 RX ADMIN — MUPIROCIN: 20 OINTMENT TOPICAL at 08:02

## 2025-02-08 RX ADMIN — METHYLPHENIDATE HYDROCHLORIDE 10 MG: 5 TABLET ORAL at 05:02

## 2025-02-08 RX ADMIN — ACETAMINOPHEN 650 MG: 650 SOLUTION ORAL at 02:02

## 2025-02-08 RX ADMIN — LABETALOL HYDROCHLORIDE 20 MG: 5 INJECTION, SOLUTION INTRAVENOUS at 02:02

## 2025-02-08 RX ADMIN — FAMOTIDINE 20 MG: 20 TABLET, FILM COATED ORAL at 08:02

## 2025-02-08 RX ADMIN — ENALAPRILAT 1.25 MG: 2.5 INJECTION INTRAVENOUS at 01:02

## 2025-02-08 RX ADMIN — LEVETIRACETAM 500 MG: 100 INJECTION, SOLUTION INTRAVENOUS at 08:02

## 2025-02-08 RX ADMIN — INSULIN GLARGINE 10 UNITS: 100 INJECTION, SOLUTION SUBCUTANEOUS at 08:02

## 2025-02-08 RX ADMIN — OXYCODONE HYDROCHLORIDE 5 MG: 5 TABLET ORAL at 04:02

## 2025-02-08 RX ADMIN — METOPROLOL TARTRATE 50 MG: 50 TABLET, FILM COATED ORAL at 09:02

## 2025-02-08 RX ADMIN — METOPROLOL TARTRATE 50 MG: 50 TABLET, FILM COATED ORAL at 08:02

## 2025-02-08 RX ADMIN — HYDRALAZINE HYDROCHLORIDE 10 MG: 20 INJECTION INTRAMUSCULAR; INTRAVENOUS at 10:02

## 2025-02-08 RX ADMIN — METHOCARBAMOL 500 MG: 500 TABLET ORAL at 05:02

## 2025-02-08 RX ADMIN — METHYLPHENIDATE HYDROCHLORIDE 10 MG: 5 TABLET ORAL at 09:02

## 2025-02-08 RX ADMIN — AMANTADINE HYDROCHLORIDE 100 MG: 50 SOLUTION ORAL at 09:02

## 2025-02-08 RX ADMIN — POTASSIUM BICARBONATE 40 MEQ: 391 TABLET, EFFERVESCENT ORAL at 09:02

## 2025-02-08 RX ADMIN — ACETAMINOPHEN 650 MG: 650 SOLUTION ORAL at 10:02

## 2025-02-08 RX ADMIN — AMANTADINE HYDROCHLORIDE 100 MG: 50 SOLUTION ORAL at 08:02

## 2025-02-08 RX ADMIN — THERA TABS 1 TABLET: TAB at 09:02

## 2025-02-08 RX ADMIN — INSULIN ASPART 4 UNITS: 100 INJECTION, SOLUTION INTRAVENOUS; SUBCUTANEOUS at 04:02

## 2025-02-08 RX ADMIN — LOSARTAN POTASSIUM 100 MG: 50 TABLET, FILM COATED ORAL at 08:02

## 2025-02-08 RX ADMIN — METHOCARBAMOL 500 MG: 500 TABLET ORAL at 10:02

## 2025-02-08 RX ADMIN — ENOXAPARIN SODIUM 40 MG: 40 INJECTION SUBCUTANEOUS at 08:02

## 2025-02-08 RX ADMIN — INSULIN ASPART 4 UNITS: 100 INJECTION, SOLUTION INTRAVENOUS; SUBCUTANEOUS at 09:02

## 2025-02-08 RX ADMIN — DOCUSATE SODIUM LIQUID 100 MG: 100 LIQUID ORAL at 08:02

## 2025-02-08 RX ADMIN — ENOXAPARIN SODIUM 40 MG: 40 INJECTION SUBCUTANEOUS at 09:02

## 2025-02-08 RX ADMIN — POLYETHYLENE GLYCOL 3350 17 G: 17 POWDER, FOR SOLUTION ORAL at 08:02

## 2025-02-08 RX ADMIN — HYDRALAZINE HYDROCHLORIDE 10 MG: 20 INJECTION INTRAMUSCULAR; INTRAVENOUS at 03:02

## 2025-02-08 RX ADMIN — HYDRALAZINE HYDROCHLORIDE 10 MG: 20 INJECTION INTRAMUSCULAR; INTRAVENOUS at 11:02

## 2025-02-08 RX ADMIN — LEVETIRACETAM 500 MG: 100 INJECTION, SOLUTION INTRAVENOUS at 09:02

## 2025-02-08 RX ADMIN — LABETALOL HYDROCHLORIDE 20 MG: 5 INJECTION, SOLUTION INTRAVENOUS at 07:02

## 2025-02-08 RX ADMIN — SODIUM CHLORIDE: 4 INJECTION, SOLUTION, CONCENTRATE INTRAVENOUS at 12:02

## 2025-02-08 RX ADMIN — INSULIN ASPART 6 UNITS: 100 INJECTION, SOLUTION INTRAVENOUS; SUBCUTANEOUS at 08:02

## 2025-02-08 RX ADMIN — INSULIN ASPART 6 UNITS: 100 INJECTION, SOLUTION INTRAVENOUS; SUBCUTANEOUS at 12:02

## 2025-02-08 RX ADMIN — POLYETHYLENE GLYCOL 3350 17 G: 17 POWDER, FOR SOLUTION ORAL at 09:02

## 2025-02-08 RX ADMIN — LABETALOL HYDROCHLORIDE 20 MG: 5 INJECTION, SOLUTION INTRAVENOUS at 09:02

## 2025-02-08 RX ADMIN — FAMOTIDINE 20 MG: 20 TABLET, FILM COATED ORAL at 09:02

## 2025-02-08 RX ADMIN — MUPIROCIN: 20 OINTMENT TOPICAL at 09:02

## 2025-02-08 RX ADMIN — DOCUSATE SODIUM LIQUID 100 MG: 100 LIQUID ORAL at 09:02

## 2025-02-08 RX ADMIN — INSULIN ASPART 6 UNITS: 100 INJECTION, SOLUTION INTRAVENOUS; SUBCUTANEOUS at 04:02

## 2025-02-08 NOTE — PT/OT/SLP PROGRESS
Occupational Therapy   Treatment    Name: Ezequiel Ramires  MRN: [id number]  Admitting Diagnosis:  <principal problem not specified>  4 Days Post-Op    Recommendations:     Recommended therapy intensity at discharge: High Intensity Therapy   Discharge Equipment Recommendations:   (TBD as patient progresses)  Barriers to discharge:       Assessment:     Ezequiel Ramires is a 64 y.o. male with a medical diagnosis of <principal problem not specified>.  Performance deficits affecting function are weakness, impaired endurance, impaired sensation, impaired self care skills, impaired functional mobility, impaired balance, visual deficits, impaired cognition, decreased coordination, decreased upper extremity function, decreased lower extremity function, decreased safety awareness, decreased ROM.     Rehab Prognosis:  Good; patient would benefit from acute skilled OT services to address these deficits and reach maximum level of function.       Plan:     Patient to be seen 5 x/week to address the above listed problems via self-care/home management, therapeutic activities, therapeutic exercises, neuromuscular re-education  Plan of Care Expires: 03/07/25  Plan of Care Reviewed with: patient    Subjective     Pain/Comfort:       Objective:     Communicated with: nurse prior to session.  Patient found HOB elevated with arterial line, blood pressure cuff, peripheral IV, PureWick, pulse ox (continuous), telemetry, CPAP, RORO drain, NG tube, SCD upon OT entry to room.    General Precautions: Standard, fall    Orthopedic Precautions:N/A  Braces:  (helmet)  Respiratory Status:  CPAP, FiO2 30,PEEP 5, 97%  Vital Signs: Blood Pressure: 147/86 nurse approved Tx     Occupational Performance:     Bed Mobility:    Patient completed Supine to Sit with total assistance and 2 persons  Patient completed Sit to Supine with total assistance and 2 persons     Activities of Daily Living:  Grooming: total assistance and hand over hand assist to wash  face    Therapeutic Activities:  Activities to facilitate static sitting balance and head control     Therapeutic Exercise:  PROM to BUE    Therapeutic Positioning    OT interventions performed during the course of today's session in an effort to prevent and/or reduce acquired pressure injuries:   Therapeutic positioning was provided at the conclusion of session to offload all bony prominences for the prevention and/or reduction of pressure injuries    [facility] 6 Click ADL: 6    Patient Education:  Patient and family were provided with verbal education and demonstrations education regarding OT role/goals/POC.  Additional teaching is warranted.      Patient left HOB elevated with all lines intact, call button in reach, and family present.    GOALS:   Multidisciplinary Problems       Occupational Therapy Goals          Problem: Occupational Therapy    Goal Priority Disciplines Outcome Interventions   Occupational Therapy Goal     OT, PT/OT Progressing    Description: LTG: Pt will perform basic ADLs and ADL t/fs with min A using LRAD by d/c.    STG: to be met by 3/7/25  1. Pt will follow 100% of simple one step commands across 3 sessions  2. Pt will perform grooming EOB with min A.   3. Pt will perform functional grasp/reach/release of items >80% of trials in prep for increased participation in ADL tasks.   4. Pt will perform sit<>stand with mod A in prep for ADL t/fs.  5. Pt will perform bedside commode transfer with mod A.                         Time Tracking:     OT Date of Treatment: 02/08/25  OT Start Time: 1036  OT Stop Time: 1110  OT Total Time (min): 34 min    Billable Minutes:Self Care/Home Management 15  Neuromuscular Re-education 19    OT/WENDI: WENDI     Number of WENDI visits since last OT visit: 1    2/8/2025

## 2025-02-08 NOTE — PROGRESS NOTES
Trauma ICU Progress Note    Patient Information:   Patient Name: Ezequiel Ramires                   : 1960     MRN: [id number]   Date of Admission: 2025  Code Status: Full Code  Date of Exam: 2025  HD#4  POD#4 Days Post-Op  Attending Provider: Maik Moreno Jr., *  Admission Summary:   Patient is a 64 year old  male with PMH of CAD, HTN, DM, HLD, alcoholism, arthritis, anxiety, MI who presents as a transfer from [facility] with bilateral SDH with 0.8 right to left midline shift, SAH, left occipital fracture, right 4th rib fracture. Report per wife patient was last seen normal around 1930 yesterday, she woke this morning to find him on the ground next to the bed with vomit on the floor, he was taken to the OSH at around 0500     Interval history:    Patient is now following commands now worked with PT/OT    Consults:   Consults: Neurosurgery Injuries:  1. Acute nondisplaced R 4th rib fracture  2. Tree-in-bud nodularities in the bilateral lower lobes which could be due to infectious bronchiolitis or aspiration   3. 4 mm anterolisthesis of C4 on C5 due to degeneration versus trauma  4. Nondisplaced left occipital bone fracture extending inferiorly to the level of the foramen magnum   5. Bilateral SDH  6. Bilateral IPH  7. Bilateral SAH  8. Small layering hemorrhage in the left occipital horn   9. 8 mm right to left midline shift    [x]Problems list reviewed  [x]Tertiary exam performed Operations/Procedures:  Crani 25     Past medical history:  HTN  HLD  DMII  Psoriasis  CAD s/p stents  MI    Medications: [x] Medications reviewed/updated   Home Meds:    Current Outpatient Medications   Medication Instructions    amLODIPine (NORVASC) 5 MG tablet 1 tablet, Daily    aspirin (ECOTRIN) 81 MG EC tablet 1 tablet, Oral, Every morning    ezetimibe (ZETIA) 10 mg tablet 1 tablet, Daily    icosapent ethyL (VASCEPA) 2,000 mg, Nightly    losartan (COZAAR) 100 MG tablet 1 tablet, Nightly    metoprolol tartrate  (LOPRESSOR) 50 MG tablet 1 tablet, 2 times daily    prasugreL HCl (EFFIENT) 10 mg, Daily    rosuvastatin (CRESTOR) 10 MG tablet 1 tablet, Nightly    semaglutide (OZEMPIC SUBQ) Subcutaneous      Scheduled Meds:    acetaminophen  650 mg Per OG tube Q4H    amantadine HCL  100 mg Per NG tube BID    [START ON 2/9/2025] amLODIPine  10 mg Per OG tube Daily    docusate  100 mg Per G Tube BID    enoxparin  40 mg Subcutaneous Q12H (prophylaxis, 0900/2100)    famotidine  20 mg Per OG tube BID    folic acid  1 mg Per OG tube Daily    insulin glargine U-100  10 Units Subcutaneous QHS    levETIRAcetam (Keppra) IV (PEDS and ADULTS)  500 mg Intravenous Q12H    losartan  100 mg Per OG tube QHS    methocarbamoL  500 mg Per OG tube Q8H    methylphenidate HCl  10 mg Per OG tube BID WM    metoprolol tartrate  50 mg Per OG tube BID    multivitamin  1 tablet Per OG tube Daily    mupirocin   Nasal BID    polyethylene glycol  17 g Per OG tube BID    thiamine  100 mg Per OG tube Daily     Continuous Infusions:    D10W   Intravenous Continuous PRN         PRN Meds:   Current Facility-Administered Medications:     acetaminophen, 650 mg, Rectal, Q4H PRN    acetaminophen, 650 mg, Oral, Q4H PRN    bisacodyL, 10 mg, Rectal, Daily PRN    D10W, , Intravenous, Continuous PRN    dextrose 50%, 12.5 g, Intravenous, PRN    dextrose 50%, 25 g, Intravenous, PRN    enalaprilat, 1.25 mg, Intravenous, Q4H PRN    glucagon (human recombinant), 1 mg, Intramuscular, PRN    hydrALAZINE, 10 mg, Intravenous, Q4H PRN    insulin aspart U-100, 0-10 Units, Subcutaneous, Q4H PRN    labetaloL, 20 mg, Intravenous, Q4H PRN    LORazepam, 2 mg, Oral, Q4H PRN    melatonin, 6 mg, Oral, Nightly PRN    ondansetron, 4 mg, Intravenous, Q6H PRN    oxyCODONE, 5 mg, Oral, Q4H PRN    prochlorperazine, 5 mg, Intravenous, Q6H PRN    Flushing PICC/Midline Protocol, , , Until Discontinued **AND** sodium chloride 0.9%, 10 mL, Intravenous, Q12H PRN     Vitals:  VITAL SIGNS: 24 HR MIN & MAX  "LAST   Temp  Min: 98.9 °F (37.2 °C)  Max: 101 °F (38.3 °C)  98.9 °F (37.2 °C)   BP  Min: 105/61  Max: 162/82  114/65    Pulse  Min: 62  Max: 88  72    Resp  Min: 18  Max: 32  18    SpO2  Min: 95 %  Max: 100 %  98 %      HT: 6' 0.01" (182.9 cm)  WT: 80 kg (176 lb 5.9 oz)  BMI: 23.9   Ideal Body Weight (IBW), Male: 178.06 lb  % Ideal Body Weight, Male (lb): 99.08 %        General  Exam: intubated      Neuro/Psych  GCS: 11T (E 4) (V T) (M 6)  Exam: Patient is following commands moving his toes for me and opening his eyes to command. Crani incision c/d/I Corbin drain +SS drainage  ICP monitor: No  ICP treatment: ICP Treatment: 3% saline   C-Collar: No    Plan:   Bilateral SDH s/p crani- Discontinue 3% Keppra x 7 days, HOB > 30 degrees. BP<140. Will start amantadine and ritalin     HEENT  Exam: NCAT ETT/OGT in place    Plan:   monitoring     Pulmonary  Vitals: Resp  Av.8  Min: 18  Max: 32  SpO2  Av.7 %  Min: 95 %  Max: 100 %    Ventilator/Oxygen Settings:   Vent Mode: CPAP / PSV  Vt Set: 500 mL  Set Rate: 18 BPM  Pressure Support: 10 cmH20  I:E Ratio Measured: 1:1.9  Total PEEP: 5 cmH20 Vent Mode: CPAP / PSV (25)  Ventilator Initiated: Yes (25 124)  Set Rate: 18 BPM (25)  Vt Set: 500 mL (25)  Pressure Support: 10 cmH20 (25)  PEEP/CPAP: 5 cmH20 (25)  Oxygen Concentration (%): 30 (25 0400)  Peak Airway Pressure: 18 cmH20 (25)  Total Ve: 8.1 L/m (25)  F/VT Ratio<105 (RSBI): (!) 31.58 (25 0300)        ABG:   Recent Labs   Lab 25  0303   PH 7.440   PO2 79.0*   PCO2 36.0   HCO3 24.5        CXR:    X-Ray Chest 1 View for Line/Tube Placement    Result Date: 2025  PICC line insertion. Confluent opacities in the left retrocardiac region with silhouetting of the left hemidiaphragm which might be related to an infiltrate/atelectasis. No other change Electronically signed by: Serg Mera Date:    2025 " "Time:    11:33        Rib fractures: right rib fracture  Chest Tube: None     Exam: Coarse BS bilaterally on minimal vent settings     Plan:     Remaining intubated due to protection of airway  CPAP trial today  Incentive Spirometry/RT Treatments: none     Cardiovascular  Vitals: Pulse  Av.2  Min: 62  Max: 88  BP  Min: 105/61  Max: 162/82  No results for input(s): "TROPONINI", "CKTOTAL", "CKMB", "BNP" in the last 168 hours.  Vasoactive Agents: None  Exam: RRR    Plan:   HTN- increased Norvasc continue home meds and prns     Renal  Recent Labs     25  0352 25  0222 25  0250   BUN 12.9 18.5 19.4   CREATININE 0.74 0.71* 0.71*       No results for input(s): "LACTIC" in the last 72 hours.    Intake/Output - Last 3 Shifts          0659  07 07 0659    I.V. (mL/kg) 1059.2 (13.2) 342 (4.3)     NG/GT 2348 1080     IV Piggyback 220.5 200     Total Intake(mL/kg) 3627.6 (45.3) 1622 (20.3)     Urine (mL/kg/hr) 1925 (1) 1100 (0.6)     Drains 105 105     Stool 0      Total Output  1205     Net +1597.6 +417            Urine Occurrence  1 x     Stool Occurrence 3 x 1 x              Intake/Output Summary (Last 24 hours) at 2025 0918  Last data filed at 2025 0400  Gross per 24 hour   Intake 1622 ml   Output 1205 ml   Net 417 ml         Garcia: no    Plan:   monitor     FEN/GI  Recent Labs     25  0352 25  0654 25  0222 25  0743 25  1810 25  0029 25  0250 25  0701   *   < > 156*   < > 155* 156* 156* 154*   K 3.5  --  3.7  --   --   --  3.3*  --    *  --  126*  --   --   --  125*  --    CO2 22*  --  22*  --   --   --  23  --    CALCIUM 8.2*  --  7.9*  --   --   --  8.2*  --    ALBUMIN 2.8*  --  2.5*  --   --   --  2.5*  --    BILITOT 0.5  --  0.4  --   --   --  0.3  --    AST 27  --  24  --   --   --  38*  --    ALKPHOS 58  --  53  --   --   --  64  --    ALT 22  --  22  --   --   --  38  --     " < > = values in this interval not displayed.       Diet: NPO tube feeds    Last BM: none    Abdominal Exam: s/NT/ND +BS    Plan:   Tube feeds at goal   Bowel regimen     Heme/Onc  Recent Labs     25  0130 25  0222 25  0250   HGB 10.7* 9.6* 10.1*   HCT 33.2* 30.5* 32.5*    176 211       Transfusions (over past 24h): None    Plan:   monitor     ID  Temp  Av.9 °F (37.7 °C)  Min: 98.9 °F (37.2 °C)  Max: 101 °F (38.3 °C)      Recent Labs     25  0130 25  0222 25  0250   WBC 9.44 7.95 7.81       Cultures: Antibiotics:    none 1. none     Plan:   monitor     Endocrine  Recent Labs     25  0352 25  0222 25  0250   GLUCOSE 221* 160* 163*      Recent Labs     25  0358 25  0855 25  1218 25  1609 25  1956 25  2347 25  0412 25  0855   POCTGLUCOSE 172* 254* 262* 210* 229* 251* 215* 242*        Plan:   DMII-continue moderate sliding scale and lantus at night  Insulin treatment: moderate SS 10 lantus QHS     Musculoskeletal  Weight bearing status:   RUE: WBAT  LUE: WBAT  RLE: WBAT  LLE: WBAT    Exam: does not have purposeful movement  Plan:   monitor     Wounds  Wounds exam: crani incision c/d/I RORO drain in place  Wound vac: No   Media: none  Plan: local wound care     Precautions  Precautions: Seizure     Prophylaxis  Seizure: Keppra (day 4)  DVT: Lovenox  GI: H2B     Lines/drains/airway   Lines/Drains/Airways       Peripherally Inserted Central Catheter Line  Duration             PICC Triple Lumen 25 1106 right basilic 2 days              Drain  Duration                  NG/OG Tube 25 orogastric Center mouth 4 days         Closed/Suction Drain 25 1600 Tube - 1 Posterior;Right;Superior Other (Comment) Bulb 10 Fr. 3 days    Male External Urinary Catheter 25 2300 Small 1 day              Airway  Duration                  Airway - Non-Surgical 25 1200 Endotracheal Tube 3 days               Peripheral Intravenous Line  Duration                  Peripheral IV - Single Lumen 02/04/25 1322 18 G Left Antecubital 3 days         Peripheral IV - Single Lumen 02/04/25 1730 20 G;18 G Right Antecubital 3 days                    Plan:  Cont all lines    [x]LDA reviewed/updated      Restraints  Face to face evaluation of need for restraints on rounds today:   Currently restrained? No.        Assessment & Disposition:   Problem list:  Active Problem List with Overview Notes    Diagnosis Date Noted    Intraparenchymal hemorrhage of brain 02/05/2025    IVH (intraventricular hemorrhage) 02/05/2025    SDH (subdural hematoma) 02/04/2025    SAH (subarachnoid hemorrhage) 02/04/2025    Closed fracture of one rib of right side 02/04/2025    Closed fracture of left side of occipital bone 02/04/2025       Guarded. Poor prognosis.  Bilateral SDH s/p crani- discontinue 3% saline, Keppra x 7 days, HOB > 30 degrees. BP<140. Will start amantadine and ritalin  Remaining intubated due to airway protection  CPAP trial today  HTN- increased Norvasc continue home meds and PRNs  Continue TF  Replace K  Lovenox  PT/OT  DMII-Moderate SS and 10 of lantus QHS  9. Will discontinue PICC     Critical Care Time:   42 minutes of critical care was spent on this patient personally by me on the following activities: development of treatment plan with patient and bedside nurse, discussions with consultants, evaluation of patient's response to treatment, examining the patient, ordering and preforming treatments and interventions, ordering and reviewing laboratory studies, ordering and reviewing radiologic studies, and re-evaluation of patient's condition.     Maik Moreno Jr, MD, MS  Trauma Critical Care Surgery  [facility]   02/08/2025

## 2025-02-08 NOTE — PT/OT/SLP PROGRESS
Physical Therapy Treatment    Patient Name:  Ezequiel Ramires   MRN:  [id number]    Recommendations:     Discharge therapy intensity: High Intensity Therapy   Discharge Equipment Recommendations:  (TBD)  Barriers to discharge: Decreased caregiver support, Impaired mobility, Ongoing medical needs, and placement    Assessment:     Ezequiel Ramires is a 64 y.o. male admitted with a medical diagnosis of B SDH s/p R craniectomy, SAH, IPH, L occipital bone fx, R 4th rib fx. Patient was found down with presumed fall. Patient presents on ventilator, orally intubated. .  He presents with the following impairments/functional limitations: weakness, impaired self care skills, impaired functional mobility, decreased lower extremity function, impaired endurance, impaired balance .    Pt sat EOB ~10min totalA, poor head control, vitals remained stable, helmet donned. Pt able to kick LLE and RLE on command but still unable to move BUEs. Eyes remained closed majority of session.    Rehab Prognosis: Good; patient would benefit from acute skilled PT services to address these deficits and reach maximum level of function.    Recent Surgery: Procedure(s) (LRB):  CRANIOTOMY, FOR SUBDURAL HEMATOMA EVACUATION (Right) 4 Days Post-Op    Plan:     During this hospitalization, patient would benefit from acute PT services 6 x/week to address the identified rehab impairments via therapeutic activities, therapeutic exercises and progress toward the following goals:    Plan of Care Expires:  03/08/25    Subjective     Chief Complaint: LEONEL  Patient/Family Comments/goals:   Pain/Comfort:         Objective:     Communicated with RN prior to session.  Patient found HOB elevated with arterial line, blood pressure cuff, peripheral IV, PureWick, pulse ox (continuous), telemetry, ventilator, RORO drain, NG tube, SCD upon PT entry to room.     General Precautions: Standard, fall (<140, R bone flap pxn)  Orthopedic Precautions: N/A  Braces:  (helmet)  Respiratory Status:  Ventilator CPAP, o2: 30%, PEEP:5  Blood Pressure: 147/84 (RN gave ok)  Skin Integrity:  known issues      Functional Mobility:  Bed Mobility:     Supine to Sit: total assistance and of 2 persons  Sit to Supine: total assistance and of 2 persons    Therapeutic Activities/Exercises:  Pt sat EOB ~10min totalA with poor trunk and head control.   While EOB pt able to to kick RLE 3x and LLE 5x on command.    Education:  Patient and family were provided with verbal education education regarding PT role/goals/POC.  Understanding was verbalized, however additional teaching warranted.     Patient left with bed in chair position with all lines intact, call button in reach, pressure relief boots, RN notified, and Family present    GOALS:   Multidisciplinary Problems       Physical Therapy Goals          Problem: Physical Therapy    Goal Priority Disciplines Outcome Interventions   Physical Therapy Goal     PT, PT/OT Progressing    Description: Goals to be met by: d/c     Patient will increase functional independence with mobility by performin. Supine to sit with MInimal Assistance  2. Sit to supine with MInimal Assistance  3. Sit to stand transfer with Minimal Assistance  4. Bed to chair transfer with Minimal Assistance using LRAD vs pivot  5. Gait to be assessed as appropriate                         Time Tracking:     PT Received On: 25  PT Start Time: 1036     PT Stop Time: 1110  PT Total Time (min): 34 min     Billable Minutes: Therapeutic Activity 34    Treatment Type: Treatment  PT/PTA: PTA     Number of PTA visits since last PT visit: 2025

## 2025-02-08 NOTE — PLAN OF CARE
Problem: Adult Inpatient Plan of Care  Goal: Plan of Care Review  Outcome: Progressing  Goal: Patient-Specific Goal (Individualized)  Outcome: Progressing  Goal: Absence of Hospital-Acquired Illness or Injury  Outcome: Progressing  Goal: Optimal Comfort and Wellbeing  Outcome: Progressing  Goal: Readiness for Transition of Care  Outcome: Progressing     Problem: Infection  Goal: Absence of Infection Signs and Symptoms  Outcome: Progressing     Problem: Wound  Goal: Optimal Coping  Outcome: Progressing  Goal: Optimal Functional Ability  Outcome: Progressing  Goal: Absence of Infection Signs and Symptoms  Outcome: Progressing  Goal: Improved Oral Intake  Outcome: Progressing  Goal: Optimal Pain Control and Function  Outcome: Progressing  Goal: Skin Health and Integrity  Outcome: Progressing  Goal: Optimal Wound Healing  Outcome: Progressing     Problem: Fall Injury Risk  Goal: Absence of Fall and Fall-Related Injury  Outcome: Progressing     Problem: Skin Injury Risk Increased  Goal: Skin Health and Integrity  Outcome: Progressing     Problem: Delirium  Goal: Optimal Coping  Outcome: Progressing  Goal: Improved Behavioral Control  Outcome: Progressing  Goal: Improved Attention and Thought Clarity  Outcome: Progressing  Goal: Improved Sleep  Outcome: Progressing     Problem: Mechanical Ventilation Invasive  Goal: Effective Communication  Outcome: Progressing  Goal: Optimal Device Function  Outcome: Progressing  Goal: Mechanical Ventilation Liberation  Outcome: Progressing  Goal: Optimal Nutrition Delivery  Outcome: Progressing  Goal: Absence of Device-Related Skin and Tissue Injury  Outcome: Progressing  Goal: Absence of Ventilator-Induced Lung Injury  Outcome: Progressing     Problem: Artificial Airway  Goal: Effective Communication  Outcome: Progressing  Goal: Optimal Device Function  Outcome: Progressing  Goal: Absence of Device-Related Skin or Tissue Injury  Outcome: Progressing

## 2025-02-09 LAB
ALBUMIN SERPL-MCNC: 2.5 G/DL (ref 3.4–4.8)
ALBUMIN/GLOB SERPL: 0.9 RATIO (ref 1.1–2)
ALLENS TEST BLOOD GAS (OHS): ABNORMAL
ALP SERPL-CCNC: 68 UNIT/L (ref 40–150)
ALT SERPL-CCNC: 40 UNIT/L (ref 0–55)
ANION GAP SERPL CALC-SCNC: 9 MEQ/L
AST SERPL-CCNC: 31 UNIT/L (ref 5–34)
BACTERIA TISS AEROBE CULT: NO GROWTH
BASE EXCESS BLD CALC-SCNC: 2.1 MMOL/L (ref -2–2)
BASOPHILS # BLD AUTO: 0.04 X10(3)/MCL
BASOPHILS NFR BLD AUTO: 0.6 %
BILIRUB SERPL-MCNC: 0.4 MG/DL
BLOOD GAS SAMPLE TYPE (OHS): ABNORMAL
BUN SERPL-MCNC: 19.3 MG/DL (ref 8.4–25.7)
CA-I BLD-SCNC: 1.13 MMOL/L (ref 1.12–1.23)
CALCIUM SERPL-MCNC: 8 MG/DL (ref 8.8–10)
CHLORIDE SERPL-SCNC: 115 MMOL/L (ref 98–107)
CO2 BLDA-SCNC: 26.6 MMOL/L
CO2 SERPL-SCNC: 25 MMOL/L (ref 23–31)
COHGB MFR BLDA: 1.7 % (ref 0.5–1.5)
CREAT SERPL-MCNC: 0.77 MG/DL (ref 0.72–1.25)
CREAT/UREA NIT SERPL: 25
DRAWN BY BLOOD GAS (OHS): ABNORMAL
EOSINOPHIL # BLD AUTO: 0.07 X10(3)/MCL (ref 0–0.9)
EOSINOPHIL NFR BLD AUTO: 1 %
ERYTHROCYTE [DISTWIDTH] IN BLOOD BY AUTOMATED COUNT: 14.7 % (ref 11.5–17)
GFR SERPLBLD CREATININE-BSD FMLA CKD-EPI: >60 ML/MIN/1.73/M2
GLOBULIN SER-MCNC: 2.9 GM/DL (ref 2.4–3.5)
GLUCOSE SERPL-MCNC: 332 MG/DL (ref 82–115)
HCO3 BLDA-SCNC: 25.5 MMOL/L (ref 22–26)
HCT VFR BLD AUTO: 32.2 % (ref 42–52)
HGB BLD-MCNC: 10.2 G/DL (ref 14–18)
IMM GRANULOCYTES # BLD AUTO: 0.04 X10(3)/MCL (ref 0–0.04)
IMM GRANULOCYTES NFR BLD AUTO: 0.6 %
INHALED O2 CONCENTRATION: 30 %
LYMPHOCYTES # BLD AUTO: 1.53 X10(3)/MCL (ref 0.6–4.6)
LYMPHOCYTES NFR BLD AUTO: 22.6 %
MCH RBC QN AUTO: 29.7 PG (ref 27–31)
MCHC RBC AUTO-ENTMCNC: 31.7 G/DL (ref 33–36)
MCV RBC AUTO: 93.6 FL (ref 80–94)
MECH RR (OHS): 18 B/MIN
METHGB MFR BLDA: 1 % (ref 0.4–1.5)
MODE (OHS): ABNORMAL
MONOCYTES # BLD AUTO: 0.67 X10(3)/MCL (ref 0.1–1.3)
MONOCYTES NFR BLD AUTO: 9.9 %
NEUTROPHILS # BLD AUTO: 4.41 X10(3)/MCL (ref 2.1–9.2)
NEUTROPHILS NFR BLD AUTO: 65.3 %
NRBC BLD AUTO-RTO: 0 %
O2 HB BLOOD GAS (OHS): 96 % (ref 94–97)
OXYGEN DEVICE BLOOD GAS (OHS): ABNORMAL
OXYHGB MFR BLDA: 13 G/DL (ref 12–16)
PCO2 BLDA: 35 MMHG (ref 35–45)
PEEP RESPIRATORY: 5 CMH2O
PH BLDA: 7.47 [PH] (ref 7.35–7.45)
PLATELET # BLD AUTO: 220 X10(3)/MCL (ref 130–400)
PMV BLD AUTO: 11 FL (ref 7.4–10.4)
PO2 BLDA: 93 MMHG (ref 80–100)
POCT GLUCOSE: 304 MG/DL (ref 70–110)
POCT GLUCOSE: 312 MG/DL (ref 70–110)
POCT GLUCOSE: 325 MG/DL (ref 70–110)
POCT GLUCOSE: 396 MG/DL (ref 70–110)
POTASSIUM BLOOD GAS (OHS): 3.5 MMOL/L (ref 3.5–5)
POTASSIUM SERPL-SCNC: 3.7 MMOL/L (ref 3.5–5.1)
PROT SERPL-MCNC: 5.4 GM/DL (ref 5.8–7.6)
PS (OHS): 10 CMH2O
RBC # BLD AUTO: 3.44 X10(6)/MCL (ref 4.7–6.1)
SAMPLE SITE BLOOD GAS (OHS): ABNORMAL
SAO2 % BLDA: 97.7 %
SODIUM BLOOD GAS (OHS): 146 MMOL/L (ref 137–145)
SODIUM SERPL-SCNC: 146 MMOL/L (ref 136–145)
SODIUM SERPL-SCNC: 148 MMOL/L (ref 136–145)
SODIUM SERPL-SCNC: 149 MMOL/L (ref 136–145)
SODIUM SERPL-SCNC: 149 MMOL/L (ref 136–145)
SPONT+MECH VT ON VENT: 500 ML
WBC # BLD AUTO: 6.76 X10(3)/MCL (ref 4.5–11.5)

## 2025-02-09 PROCEDURE — 94760 N-INVAS EAR/PLS OXIMETRY 1: CPT | Mod: XB

## 2025-02-09 PROCEDURE — 84295 ASSAY OF SERUM SODIUM: CPT | Performed by: SURGERY

## 2025-02-09 PROCEDURE — 63600175 PHARM REV CODE 636 W HCPCS

## 2025-02-09 PROCEDURE — 99900031 HC PATIENT EDUCATION (STAT)

## 2025-02-09 PROCEDURE — 27100171 HC OXYGEN HIGH FLOW UP TO 24 HOURS

## 2025-02-09 PROCEDURE — 99900035 HC TECH TIME PER 15 MIN (STAT)

## 2025-02-09 PROCEDURE — 36415 COLL VENOUS BLD VENIPUNCTURE: CPT | Performed by: SURGERY

## 2025-02-09 PROCEDURE — 80053 COMPREHEN METABOLIC PANEL: CPT

## 2025-02-09 PROCEDURE — 37799 UNLISTED PX VASCULAR SURGERY: CPT

## 2025-02-09 PROCEDURE — 82803 BLOOD GASES ANY COMBINATION: CPT

## 2025-02-09 PROCEDURE — 94761 N-INVAS EAR/PLS OXIMETRY MLT: CPT | Mod: XB

## 2025-02-09 PROCEDURE — 99900026 HC AIRWAY MAINTENANCE (STAT)

## 2025-02-09 PROCEDURE — 36415 COLL VENOUS BLD VENIPUNCTURE: CPT

## 2025-02-09 PROCEDURE — 25000003 PHARM REV CODE 250

## 2025-02-09 PROCEDURE — 99291 CRITICAL CARE FIRST HOUR: CPT | Mod: ,,, | Performed by: SURGERY

## 2025-02-09 PROCEDURE — 94003 VENT MGMT INPAT SUBQ DAY: CPT

## 2025-02-09 PROCEDURE — 63600175 PHARM REV CODE 636 W HCPCS: Mod: JZ,TB | Performed by: SURGERY

## 2025-02-09 PROCEDURE — 85025 COMPLETE CBC W/AUTO DIFF WBC: CPT

## 2025-02-09 PROCEDURE — 25000003 PHARM REV CODE 250: Performed by: NURSE PRACTITIONER

## 2025-02-09 PROCEDURE — 25000003 PHARM REV CODE 250: Performed by: SURGERY

## 2025-02-09 PROCEDURE — 20800000 HC ICU TRAUMA

## 2025-02-09 PROCEDURE — 63600175 PHARM REV CODE 636 W HCPCS: Performed by: NURSE PRACTITIONER

## 2025-02-09 RX ORDER — INSULIN GLARGINE 100 [IU]/ML
10 INJECTION, SOLUTION SUBCUTANEOUS ONCE
Status: COMPLETED | OUTPATIENT
Start: 2025-02-09 | End: 2025-02-09

## 2025-02-09 RX ORDER — BUMETANIDE 0.25 MG/ML
1 INJECTION, SOLUTION INTRAMUSCULAR; INTRAVENOUS ONCE
Status: COMPLETED | OUTPATIENT
Start: 2025-02-09 | End: 2025-02-09

## 2025-02-09 RX ORDER — INSULIN GLARGINE 100 [IU]/ML
20 INJECTION, SOLUTION SUBCUTANEOUS 2 TIMES DAILY
Status: DISCONTINUED | OUTPATIENT
Start: 2025-02-09 | End: 2025-02-11

## 2025-02-09 RX ADMIN — INSULIN ASPART 8 UNITS: 100 INJECTION, SOLUTION INTRAVENOUS; SUBCUTANEOUS at 04:02

## 2025-02-09 RX ADMIN — METHYLPHENIDATE HYDROCHLORIDE 10 MG: 5 TABLET ORAL at 04:02

## 2025-02-09 RX ADMIN — LABETALOL HYDROCHLORIDE 20 MG: 5 INJECTION, SOLUTION INTRAVENOUS at 02:02

## 2025-02-09 RX ADMIN — INSULIN GLARGINE 10 UNITS: 100 INJECTION, SOLUTION SUBCUTANEOUS at 07:02

## 2025-02-09 RX ADMIN — AMANTADINE HYDROCHLORIDE 100 MG: 50 SOLUTION ORAL at 08:02

## 2025-02-09 RX ADMIN — LABETALOL HYDROCHLORIDE 20 MG: 5 INJECTION, SOLUTION INTRAVENOUS at 09:02

## 2025-02-09 RX ADMIN — DOCUSATE SODIUM LIQUID 100 MG: 100 LIQUID ORAL at 08:02

## 2025-02-09 RX ADMIN — MUPIROCIN: 20 OINTMENT TOPICAL at 07:02

## 2025-02-09 RX ADMIN — THERA TABS 1 TABLET: TAB at 07:02

## 2025-02-09 RX ADMIN — BUMETANIDE 1 MG: 0.25 INJECTION INTRAMUSCULAR; INTRAVENOUS at 08:02

## 2025-02-09 RX ADMIN — INSULIN GLARGINE 10 UNITS: 100 INJECTION, SOLUTION SUBCUTANEOUS at 09:02

## 2025-02-09 RX ADMIN — LEVETIRACETAM 500 MG: 100 INJECTION, SOLUTION INTRAVENOUS at 07:02

## 2025-02-09 RX ADMIN — METOPROLOL TARTRATE 50 MG: 50 TABLET, FILM COATED ORAL at 07:02

## 2025-02-09 RX ADMIN — AMLODIPINE BESYLATE 10 MG: 5 TABLET ORAL at 07:02

## 2025-02-09 RX ADMIN — OXYCODONE HYDROCHLORIDE 5 MG: 5 TABLET ORAL at 10:02

## 2025-02-09 RX ADMIN — ACETAMINOPHEN 650 MG: 650 SOLUTION ORAL at 02:02

## 2025-02-09 RX ADMIN — POLYETHYLENE GLYCOL 3350 17 G: 17 POWDER, FOR SOLUTION ORAL at 08:02

## 2025-02-09 RX ADMIN — METHOCARBAMOL 500 MG: 500 TABLET ORAL at 06:02

## 2025-02-09 RX ADMIN — METHYLPHENIDATE HYDROCHLORIDE 10 MG: 5 TABLET ORAL at 07:02

## 2025-02-09 RX ADMIN — FAMOTIDINE 20 MG: 20 TABLET, FILM COATED ORAL at 09:02

## 2025-02-09 RX ADMIN — INSULIN ASPART 10 UNITS: 100 INJECTION, SOLUTION INTRAVENOUS; SUBCUTANEOUS at 06:02

## 2025-02-09 RX ADMIN — LEVETIRACETAM 500 MG: 100 INJECTION, SOLUTION INTRAVENOUS at 08:02

## 2025-02-09 RX ADMIN — THIAMINE HCL TAB 100 MG 100 MG: 100 TAB at 07:02

## 2025-02-09 RX ADMIN — INSULIN ASPART 8 UNITS: 100 INJECTION, SOLUTION INTRAVENOUS; SUBCUTANEOUS at 12:02

## 2025-02-09 RX ADMIN — METHOCARBAMOL 500 MG: 500 TABLET ORAL at 02:02

## 2025-02-09 RX ADMIN — ENOXAPARIN SODIUM 40 MG: 40 INJECTION SUBCUTANEOUS at 07:02

## 2025-02-09 RX ADMIN — INSULIN ASPART 8 UNITS: 100 INJECTION, SOLUTION INTRAVENOUS; SUBCUTANEOUS at 08:02

## 2025-02-09 RX ADMIN — ACETAMINOPHEN 650 MG: 650 SOLUTION ORAL at 06:02

## 2025-02-09 RX ADMIN — HYDRALAZINE HYDROCHLORIDE 10 MG: 20 INJECTION INTRAMUSCULAR; INTRAVENOUS at 02:02

## 2025-02-09 RX ADMIN — LOSARTAN POTASSIUM 100 MG: 50 TABLET, FILM COATED ORAL at 08:02

## 2025-02-09 RX ADMIN — FOLIC ACID 1 MG: 1 TABLET ORAL at 07:02

## 2025-02-09 RX ADMIN — ENALAPRILAT 1.25 MG: 2.5 INJECTION INTRAVENOUS at 06:02

## 2025-02-09 RX ADMIN — AMANTADINE HYDROCHLORIDE 100 MG: 50 SOLUTION ORAL at 07:02

## 2025-02-09 RX ADMIN — HYDRALAZINE HYDROCHLORIDE 10 MG: 20 INJECTION INTRAMUSCULAR; INTRAVENOUS at 12:02

## 2025-02-09 RX ADMIN — ENOXAPARIN SODIUM 40 MG: 40 INJECTION SUBCUTANEOUS at 08:02

## 2025-02-09 RX ADMIN — LABETALOL HYDROCHLORIDE 20 MG: 5 INJECTION, SOLUTION INTRAVENOUS at 06:02

## 2025-02-09 RX ADMIN — FAMOTIDINE 20 MG: 20 TABLET, FILM COATED ORAL at 07:02

## 2025-02-09 RX ADMIN — INSULIN GLARGINE 20 UNITS: 100 INJECTION, SOLUTION SUBCUTANEOUS at 08:02

## 2025-02-09 RX ADMIN — ENALAPRILAT 1.25 MG: 2.5 INJECTION INTRAVENOUS at 01:02

## 2025-02-09 RX ADMIN — METOPROLOL TARTRATE 50 MG: 50 TABLET, FILM COATED ORAL at 08:02

## 2025-02-09 NOTE — PROGRESS NOTES
Trauma ICU Progress Note    Patient Information:   Patient Name: Ezequiel Ramires                   : 1960     MRN: [id number]   Date of Admission: 2025  Code Status: Full Code  Date of Exam: 2025  HD#5  POD#5 Days Post-Op  Attending Provider: Maik Moreno Jr., *  Admission Summary:   Patient is a 64 year old  male with PMH of CAD, HTN, DM, HLD, alcoholism, arthritis, anxiety, MI who presents as a transfer from [facility] with bilateral SDH with 0.8 right to left midline shift, SAH, left occipital fracture, right 4th rib fracture. Report per wife patient was last seen normal around 1930 yesterday, she woke this morning to find him on the ground next to the bed with vomit on the floor, he was taken to the OSH at around 0500     Interval history:    NAEON. CPAP during day rate at night    Consults:   Consults: Neurosurgery Injuries:  1. Acute nondisplaced R 4th rib fracture  2. Tree-in-bud nodularities in the bilateral lower lobes which could be due to infectious bronchiolitis or aspiration   3. 4 mm anterolisthesis of C4 on C5 due to degeneration versus trauma  4. Nondisplaced left occipital bone fracture extending inferiorly to the level of the foramen magnum   5. Bilateral SDH  6. Bilateral IPH  7. Bilateral SAH  8. Small layering hemorrhage in the left occipital horn   9. 8 mm right to left midline shift    [x]Problems list reviewed  [x]Tertiary exam performed Operations/Procedures:  Crani 25     Past medical history:  HTN  HLD  DMII  Psoriasis  CAD s/p stents  MI    Medications: [x] Medications reviewed/updated   Home Meds:    Current Outpatient Medications   Medication Instructions    amLODIPine (NORVASC) 5 MG tablet 1 tablet, Daily    aspirin (ECOTRIN) 81 MG EC tablet 1 tablet, Oral, Every morning    ezetimibe (ZETIA) 10 mg tablet 1 tablet, Daily    icosapent ethyL (VASCEPA) 2,000 mg, Nightly    losartan (COZAAR) 100 MG tablet 1 tablet, Nightly    metoprolol tartrate (LOPRESSOR) 50 MG tablet 1  tablet, 2 times daily    prasugreL HCl (EFFIENT) 10 mg, Daily    rosuvastatin (CRESTOR) 10 MG tablet 1 tablet, Nightly    semaglutide (OZEMPIC SUBQ) Subcutaneous      Scheduled Meds:    acetaminophen  650 mg Per OG tube Q4H    amantadine HCL  100 mg Per NG tube BID    amLODIPine  10 mg Per OG tube Daily    docusate  100 mg Per G Tube BID    enoxparin  40 mg Subcutaneous Q12H (prophylaxis, 0900/2100)    famotidine  20 mg Per OG tube BID    folic acid  1 mg Per OG tube Daily    insulin glargine U-100  10 Units Subcutaneous BID    levETIRAcetam (Keppra) IV (PEDS and ADULTS)  500 mg Intravenous Q12H    losartan  100 mg Per OG tube QHS    methocarbamoL  500 mg Per OG tube Q8H    methylphenidate HCl  10 mg Per OG tube BID WM    metoprolol tartrate  50 mg Per OG tube BID    multivitamin  1 tablet Per OG tube Daily    polyethylene glycol  17 g Per OG tube BID    thiamine  100 mg Per OG tube Daily     Continuous Infusions:    D10W   Intravenous Continuous PRN         PRN Meds:   Current Facility-Administered Medications:     acetaminophen, 650 mg, Rectal, Q4H PRN    acetaminophen, 650 mg, Oral, Q4H PRN    bisacodyL, 10 mg, Rectal, Daily PRN    D10W, , Intravenous, Continuous PRN    dextrose 50%, 12.5 g, Intravenous, PRN    dextrose 50%, 25 g, Intravenous, PRN    enalaprilat, 1.25 mg, Intravenous, Q4H PRN    glucagon (human recombinant), 1 mg, Intramuscular, PRN    hydrALAZINE, 10 mg, Intravenous, Q4H PRN    insulin aspart U-100, 0-10 Units, Subcutaneous, Q4H PRN    labetaloL, 20 mg, Intravenous, Q4H PRN    LORazepam, 2 mg, Oral, Q4H PRN    melatonin, 6 mg, Oral, Nightly PRN    ondansetron, 4 mg, Intravenous, Q6H PRN    oxyCODONE, 5 mg, Oral, Q4H PRN    prochlorperazine, 5 mg, Intravenous, Q6H PRN    Flushing PICC/Midline Protocol, , , Until Discontinued **AND** sodium chloride 0.9%, 10 mL, Intravenous, Q12H PRN     Vitals:  VITAL SIGNS: 24 HR MIN & MAX LAST   Temp  Min: 98.8 °F (37.1 °C)  Max: 100.6 °F (38.1 °C)  98.9 °F  "(37.2 °C)   BP  Min: 115/60  Max: 183/79  131/73    Pulse  Min: 73  Max: 98  84    Resp  Min: 11  Max: 30  (!) 23    SpO2  Min: 95 %  Max: 98 %  98 %      HT: 6' 0.01" (182.9 cm)  WT: 80 kg (176 lb 5.9 oz)  BMI: 23.9   Ideal Body Weight (IBW), Male: 178.06 lb  % Ideal Body Weight, Male (lb): 99.08 %        General  Exam: intubated      Neuro/Psych  GCS: 11T (E 4) (V T) (M 6)  Exam: Patient is following commands moving his toes for me and opening his eyes to command. Crani incision c/d/I Corbin drain +SS drainage  ICP monitor: No  ICP treatment: ICP Treatment: 3% saline   C-Collar: No    Plan:   Bilateral SDH s/p crani-  Keppra x 7 days, HOB > 30 degrees. BP<140. Will start amantadine and ritalin     HEENT  Exam: NCAT ETT/OGT in place    Plan:   monitoring     Pulmonary  Vitals: Resp  Av.6  Min: 11  Max: 30  SpO2  Av.8 %  Min: 95 %  Max: 98 %    Ventilator/Oxygen Settings:   Vent Mode: SIMV  Vt Set: 500 mL  Set Rate: 18 BPM  Pressure Support: 10 cmH20  I:E Ratio Measured: 1:2.5  Total PEEP: 5 cmH20 Vent Mode: SIMV (25)  Ventilator Initiated: Yes (25 1240)  Set Rate: 18 BPM (25)  Vt Set: 500 mL (25)  Pressure Support: 10 cmH20 (25)  PEEP/CPAP: 5 cmH20 (25)  Oxygen Concentration (%): 30 (25 0600)  Peak Airway Pressure: 15 cmH20 (25)  Total Ve: 8.2 L/m (25)  F/VT Ratio<105 (RSBI): (!) 48.19 (25)        ABG:   Recent Labs   Lab 25  040   PH 7.470*   PO2 93.0   PCO2 35.0   HCO3 25.5        CXR:    X-Ray Chest 1 View for Line/Tube Placement    Result Date: 2025  PICC line insertion. Confluent opacities in the left retrocardiac region with silhouetting of the left hemidiaphragm which might be related to an infiltrate/atelectasis. No other change Electronically signed by: Serg Mera Date:    2025 Time:    11:33        Rib fractures: right rib fracture  Chest Tube: None     Exam: Coarse BS " "bilaterally on minimal vent settings     Plan:     Remaining intubated due to protection of airway- will need a trach  CPAP trial today  Incentive Spirometry/RT Treatments: none     Cardiovascular  Vitals: Pulse  Av.7  Min: 73  Max: 98  BP  Min: 115/60  Max: 183/79  No results for input(s): "TROPONINI", "CKTOTAL", "CKMB", "BNP" in the last 168 hours.  Vasoactive Agents: None  Exam: RRR    Plan:   HTN- continue home meds and prns     Renal  Recent Labs     25  0222 25  0250 25  0422   BUN 18.5 19.4 19.3   CREATININE 0.71* 0.71* 0.77       No results for input(s): "LACTIC" in the last 72 hours.    Intake/Output - Last 3 Shifts          07 0659  07 0659  0700  02/10 0659    I.V. (mL/kg) 342 (4.3) 0 (0)     NG/GT 1080 1458     IV Piggyback 200 100     Total Intake(mL/kg) 1622 (20.3) 1558 (19.5)     Urine (mL/kg/hr) 1100 (0.6) 2550 (1.3)     Drains 105 45     Stool       Total Output 1205 2595     Net +417 -1037            Urine Occurrence 1 x      Stool Occurrence 1 x               Intake/Output Summary (Last 24 hours) at 2025 0839  Last data filed at 2025 2200  Gross per 24 hour   Intake 1558 ml   Output 2595 ml   Net -1037 ml         Garcia: no    Plan:   monitor     FEN/GI  Recent Labs     25  0222 25  0743 25  0250 25  0701 25  1159 25  1816 25  2357 25  0422   *   < > 156*   < > 152* 152* 149* 149*   K 3.7  --  3.3*  --   --   --   --  3.7   *  --  125*  --   --   --   --  115*   CO2 22*  --  23  --   --   --   --  25   CALCIUM 7.9*  --  8.2*  --   --   --   --  8.0*   ALBUMIN 2.5*  --  2.5*  --   --   --   --  2.5*   BILITOT 0.4  --  0.3  --   --   --   --  0.4   AST 24  --  38*  --   --   --   --  31   ALKPHOS 53  --  64  --   --   --   --  68   ALT 22  --  38  --   --   --   --  40    < > = values in this interval not displayed.       Diet: NPO tube feeds at goal    Last BM: +BM    Abdominal " Exam: s/NT/ND +BS    Plan:   Tube feeds at goal   Bowel regimen  Will need a PEG     Heme/Onc  Recent Labs     25  0222 25  0250 25  0422   HGB 9.6* 10.1* 10.2*   HCT 30.5* 32.5* 32.2*    211 220       Transfusions (over past 24h): None    Plan:   monitor     ID  Temp  Av.2 °F (37.3 °C)  Min: 98.8 °F (37.1 °C)  Max: 100.6 °F (38.1 °C)      Recent Labs     25  0222 25  0250 25  0422   WBC 7.95 7.81 6.76       Cultures: Antibiotics:    none 1. none     Plan:   monitor     Endocrine  Recent Labs     25  0250 25  0422   GLUCOSE 160* 163* 332*      Recent Labs     25  1956 25  2347 25  0412 25  0855 25  1200 25  1623 25  2041 25  0618   POCTGLUCOSE 229* 251* 215* 242* 252* 266* 256* 396*        Plan:   DMII-continue moderate sliding scale and increase lantus to 20 BID  Insulin treatment: moderate SS 20 lantus BID     Musculoskeletal  Weight bearing status:   RUE: WBAT  LUE: WBAT  RLE: WBAT  LLE: WBAT    Exam: does not have purposeful movement  Plan:   monitor     Wounds  Wounds exam: crani incision c/d/I RORO drain in place  Wound vac: No   Media: none  Plan: local wound care     Precautions  Precautions: Seizure     Prophylaxis  Seizure: Keppra (day 5/7)  DVT: Lovenox  GI: H2B     Lines/drains/airway   Lines/Drains/Airways       Drain  Duration                  NG/OG Tube 25 orogastric Center mouth 5 days         Closed/Suction Drain 25 1600 Tube - 1 Posterior;Right;Superior Other (Comment) Bulb 10 Fr. 4 days    Male External Urinary Catheter 25 2300 Small 2 days              Airway  Duration                  Airway - Non-Surgical 25 1200 Endotracheal Tube 4 days              Peripheral Intravenous Line  Duration                  Peripheral IV - Single Lumen 25 1322 18 G Left Antecubital 4 days         Peripheral IV - Single Lumen 25 1730 20 G;18 G Right Antecubital  4 days         Peripheral IV - Single Lumen 02/08/25 1600 18 G Anterior;Right Upper Arm <1 day                    Plan:  Cont all lines    [x]LDA reviewed/updated      Restraints  Face to face evaluation of need for restraints on rounds today:   Currently restrained? No.        Assessment & Disposition:   Problem list:  Active Problem List with Overview Notes    Diagnosis Date Noted    Intraparenchymal hemorrhage of brain 02/05/2025    IVH (intraventricular hemorrhage) 02/05/2025    SDH (subdural hematoma) 02/04/2025    SAH (subarachnoid hemorrhage) 02/04/2025    Closed fracture of one rib of right side 02/04/2025    Closed fracture of left side of occipital bone 02/04/2025       Guarded. Poor prognosis.  Bilateral SDH s/p crani- discontinue 3% saline, Keppra x 7 days, HOB > 30 degrees. BP<140. Will start amantadine and ritalin  Remaining intubated due to airway protection  Will need trach  CPAP trial today  HTN-  continue home meds and PRNs  Continue TF  Will need trach  Diuresis- 2 bumex  Lovenox  PT/OT  DMII-Moderate SS and 120 of lantus BID       Critical Care Time:   42 minutes of critical care was spent on this patient personally by me on the following activities: development of treatment plan with patient and bedside nurse, discussions with consultants, evaluation of patient's response to treatment, examining the patient, ordering and preforming treatments and interventions, ordering and reviewing laboratory studies, ordering and reviewing radiologic studies, and re-evaluation of patient's condition.     Maik Moreno Jr, MD, MS  Trauma Critical Care Surgery  [facility]   02/09/2025

## 2025-02-09 NOTE — PROGRESS NOTES
POD#5 right craniectomy for SDH  He is currently intubated, mechanically ventilated  No sedation  Na 149    AFVSS  Pupils 3mm bilateral, reactive brisk  He does open his eyes to sternal rub  He is intermittently following commands in bilateral LE  Dressing c/d/I  Flap full but not tense  Drain output 45, currently with 15 in bulb    Plan: Continue drain  OK to leave incision open to air  Continue Q2 hour neuro exams  Continue BP parameters below 150/90  Keppra BID  SCDs and lovenox for DVT prophylaxis

## 2025-02-10 LAB
ALBUMIN SERPL-MCNC: 2.8 G/DL (ref 3.4–4.8)
ALBUMIN/GLOB SERPL: 0.9 RATIO (ref 1.1–2)
ALLENS TEST BLOOD GAS (OHS): ABNORMAL
ALP SERPL-CCNC: 73 UNIT/L (ref 40–150)
ALT SERPL-CCNC: 33 UNIT/L (ref 0–55)
ANION GAP SERPL CALC-SCNC: 7 MEQ/L
APICAL FOUR CHAMBER EJECTION FRACTION: 57 %
AST SERPL-CCNC: 18 UNIT/L (ref 5–34)
AV INDEX (PROSTH): 0.85
AV MEAN GRADIENT: 5 MMHG
AV PEAK GRADIENT: 10 MMHG
AV VALVE AREA BY VELOCITY RATIO: 3.4 CM²
AV VALVE AREA: 3.5 CM²
AV VELOCITY RATIO: 0.81
BASE EXCESS BLD CALC-SCNC: 4.8 MMOL/L (ref -2–2)
BASOPHILS # BLD AUTO: 0.06 X10(3)/MCL
BASOPHILS NFR BLD AUTO: 0.7 %
BILIRUB SERPL-MCNC: 0.6 MG/DL
BLOOD GAS SAMPLE TYPE (OHS): ABNORMAL
BSA FOR ECHO PROCEDURE: 2.02 M2
BUN SERPL-MCNC: 24.7 MG/DL (ref 8.4–25.7)
CA-I BLD-SCNC: 1.17 MMOL/L (ref 1.12–1.23)
CALCIUM SERPL-MCNC: 8.6 MG/DL (ref 8.8–10)
CHLORIDE SERPL-SCNC: 110 MMOL/L (ref 98–107)
CO2 BLDA-SCNC: 29 MMOL/L
CO2 SERPL-SCNC: 27 MMOL/L (ref 23–31)
COHGB MFR BLDA: 1.8 % (ref 0.5–1.5)
CREAT SERPL-MCNC: 0.8 MG/DL (ref 0.72–1.25)
CREAT/UREA NIT SERPL: 31
CRP SERPL-MCNC: 40.8 MG/L
CV ECHO LV RWT: 0.51 CM
DOP CALC AO PEAK VEL: 1.6 M/S
DOP CALC AO VTI: 17 CM
DOP CALC LVOT AREA: 4.2 CM2
DOP CALC LVOT DIAMETER: 2.3 CM
DOP CALC LVOT PEAK VEL: 1.3 M/S
DOP CALC LVOT STROKE VOLUME: 59.8 CM3
DOP CALC MV VTI: 20.9 CM
DOP CALCLVOT PEAK VEL VTI: 14.4 CM
DRAWN BY BLOOD GAS (OHS): ABNORMAL
E WAVE DECELERATION TIME: 187 MSEC
E/A RATIO: 0.94
E/E' RATIO: 9 M/S
ECHO LV POSTERIOR WALL: 1.1 CM (ref 0.6–1.1)
EJECTION FRACTION: 55 %
EOSINOPHIL # BLD AUTO: 0.08 X10(3)/MCL (ref 0–0.9)
EOSINOPHIL NFR BLD AUTO: 0.9 %
ERYTHROCYTE [DISTWIDTH] IN BLOOD BY AUTOMATED COUNT: 14.6 % (ref 11.5–17)
FRACTIONAL SHORTENING: 30.2 % (ref 28–44)
GFR SERPLBLD CREATININE-BSD FMLA CKD-EPI: >60 ML/MIN/1.73/M2
GLOBULIN SER-MCNC: 3.2 GM/DL (ref 2.4–3.5)
GLUCOSE SERPL-MCNC: 305 MG/DL (ref 82–115)
GLUCOSE SERPL-MCNC: 342 MG/DL (ref 70–110)
HCO3 BLDA-SCNC: 27.9 MMOL/L (ref 22–26)
HCT VFR BLD AUTO: 36.3 % (ref 42–52)
HGB BLD-MCNC: 11.5 G/DL (ref 14–18)
IMM GRANULOCYTES # BLD AUTO: 0.04 X10(3)/MCL (ref 0–0.04)
IMM GRANULOCYTES NFR BLD AUTO: 0.5 %
INHALED O2 CONCENTRATION: 30 %
INTERVENTRICULAR SEPTUM: 1.2 CM (ref 0.6–1.1)
LEFT ATRIUM AREA SYSTOLIC (APICAL 4 CHAMBER): 11.6 CM2
LEFT ATRIUM SIZE: 3.1 CM
LEFT INTERNAL DIMENSION IN SYSTOLE: 3 CM (ref 2.1–4)
LEFT VENTRICLE DIASTOLIC VOLUME INDEX: 40.89 ML/M2
LEFT VENTRICLE DIASTOLIC VOLUME: 82.6 ML
LEFT VENTRICLE END DIASTOLIC VOLUME APICAL 4 CHAMBER: 52.2 ML
LEFT VENTRICLE END SYSTOLIC VOLUME APICAL 4 CHAMBER: 24.8 ML
LEFT VENTRICLE MASS INDEX: 86 G/M2
LEFT VENTRICLE SYSTOLIC VOLUME INDEX: 17.5 ML/M2
LEFT VENTRICLE SYSTOLIC VOLUME: 35.3 ML
LEFT VENTRICULAR INTERNAL DIMENSION IN DIASTOLE: 4.3 CM (ref 3.5–6)
LEFT VENTRICULAR MASS: 173.6 G
LV LATERAL E/E' RATIO: 7.1 M/S
LV SEPTAL E/E' RATIO: 12.8 M/S
LVED V (TEICH): 82.6 ML
LVES V (TEICH): 35.3 ML
LVOT MG: 4 MMHG
LVOT MV: 0.86 CM/S
LYMPHOCYTES # BLD AUTO: 1.51 X10(3)/MCL (ref 0.6–4.6)
LYMPHOCYTES NFR BLD AUTO: 17.1 %
MCH RBC QN AUTO: 29.3 PG (ref 27–31)
MCHC RBC AUTO-ENTMCNC: 31.7 G/DL (ref 33–36)
MCV RBC AUTO: 92.4 FL (ref 80–94)
MECH RR (OHS): 18 B/MIN
METHGB MFR BLDA: 0.6 % (ref 0.4–1.5)
MODE (OHS): ABNORMAL
MONOCYTES # BLD AUTO: 1.01 X10(3)/MCL (ref 0.1–1.3)
MONOCYTES NFR BLD AUTO: 11.5 %
MV MEAN GRADIENT: 2 MMHG
MV PEAK A VEL: 0.68 M/S
MV PEAK E VEL: 0.64 M/S
MV PEAK GRADIENT: 3 MMHG
MV STENOSIS PRESSURE HALF TIME: 167 MS
MV VALVE AREA BY CONTINUITY EQUATION: 2.86 CM2
MV VALVE AREA P 1/2 METHOD: 1.32 CM2
NEUTROPHILS # BLD AUTO: 6.11 X10(3)/MCL (ref 2.1–9.2)
NEUTROPHILS NFR BLD AUTO: 69.3 %
NRBC BLD AUTO-RTO: 0 %
O2 HB BLOOD GAS (OHS): 95.8 % (ref 94–97)
OHS QRS DURATION: 74 MS
OHS QTC CALCULATION: 441 MS
OXYGEN DEVICE BLOOD GAS (OHS): ABNORMAL
OXYHGB MFR BLDA: 11.6 G/DL (ref 12–16)
PCO2 BLDA: 35 MMHG (ref 35–45)
PEEP RESPIRATORY: 5 CMH2O
PH BLDA: 7.51 [PH] (ref 7.35–7.45)
PISA TR MAX VEL: 2.7 M/S
PLATELET # BLD AUTO: 273 X10(3)/MCL (ref 130–400)
PMV BLD AUTO: 11 FL (ref 7.4–10.4)
PO2 BLDA: 93 MMHG (ref 80–100)
POCT GLUCOSE: 272 MG/DL (ref 70–110)
POCT GLUCOSE: 295 MG/DL (ref 70–110)
POCT GLUCOSE: 304 MG/DL (ref 70–110)
POCT GLUCOSE: 307 MG/DL (ref 70–110)
POCT GLUCOSE: 342 MG/DL (ref 70–110)
POTASSIUM BLOOD GAS (OHS): 3.9 MMOL/L (ref 3.5–5)
POTASSIUM SERPL-SCNC: 4 MMOL/L (ref 3.5–5.1)
PREALB SERPL-MCNC: 16.7 MG/DL (ref 16–42)
PROT SERPL-MCNC: 6 GM/DL (ref 5.8–7.6)
PS (OHS): 10 CMH2O
PV PEAK GRADIENT: 26 MMHG
PV PEAK VELOCITY: 2.54 M/S
RBC # BLD AUTO: 3.93 X10(6)/MCL (ref 4.7–6.1)
SAMPLE SITE BLOOD GAS (OHS): ABNORMAL
SAO2 % BLDA: 97.9 %
SODIUM BLOOD GAS (OHS): 143 MMOL/L (ref 137–145)
SODIUM SERPL-SCNC: 141 MMOL/L (ref 136–145)
SODIUM SERPL-SCNC: 144 MMOL/L (ref 136–145)
SPONT+MECH VT ON VENT: 500 ML
TDI LATERAL: 0.09 M/S
TDI SEPTAL: 0.05 M/S
TDI: 0.07 M/S
TR MAX PG: 29 MMHG
TRICUSPID ANNULAR PLANE SYSTOLIC EXCURSION: 1.97 CM
WBC # BLD AUTO: 8.81 X10(3)/MCL (ref 4.5–11.5)
Z-SCORE OF LEFT VENTRICULAR DIMENSION IN END DIASTOLE: -3.25
Z-SCORE OF LEFT VENTRICULAR DIMENSION IN END SYSTOLE: -1.55

## 2025-02-10 PROCEDURE — 36415 COLL VENOUS BLD VENIPUNCTURE: CPT

## 2025-02-10 PROCEDURE — 85025 COMPLETE CBC W/AUTO DIFF WBC: CPT

## 2025-02-10 PROCEDURE — 80053 COMPREHEN METABOLIC PANEL: CPT

## 2025-02-10 PROCEDURE — 25000003 PHARM REV CODE 250: Performed by: SURGERY

## 2025-02-10 PROCEDURE — 94003 VENT MGMT INPAT SUBQ DAY: CPT

## 2025-02-10 PROCEDURE — 93010 ELECTROCARDIOGRAM REPORT: CPT | Mod: ,,, | Performed by: INTERNAL MEDICINE

## 2025-02-10 PROCEDURE — 27000221 HC OXYGEN, UP TO 24 HOURS

## 2025-02-10 PROCEDURE — 37799 UNLISTED PX VASCULAR SURGERY: CPT

## 2025-02-10 PROCEDURE — 84295 ASSAY OF SERUM SODIUM: CPT | Performed by: SURGERY

## 2025-02-10 PROCEDURE — 27200966 HC CLOSED SUCTION SYSTEM

## 2025-02-10 PROCEDURE — 93005 ELECTROCARDIOGRAM TRACING: CPT

## 2025-02-10 PROCEDURE — 63600175 PHARM REV CODE 636 W HCPCS: Performed by: SURGERY

## 2025-02-10 PROCEDURE — 27100171 HC OXYGEN HIGH FLOW UP TO 24 HOURS

## 2025-02-10 PROCEDURE — 63600175 PHARM REV CODE 636 W HCPCS

## 2025-02-10 PROCEDURE — 97530 THERAPEUTIC ACTIVITIES: CPT | Mod: CO

## 2025-02-10 PROCEDURE — 25000003 PHARM REV CODE 250

## 2025-02-10 PROCEDURE — 87186 SC STD MICRODIL/AGAR DIL: CPT

## 2025-02-10 PROCEDURE — 94761 N-INVAS EAR/PLS OXIMETRY MLT: CPT | Mod: XB

## 2025-02-10 PROCEDURE — 86140 C-REACTIVE PROTEIN: CPT

## 2025-02-10 PROCEDURE — 20800000 HC ICU TRAUMA

## 2025-02-10 PROCEDURE — 99291 CRITICAL CARE FIRST HOUR: CPT | Mod: ,,, | Performed by: SURGERY

## 2025-02-10 PROCEDURE — 97110 THERAPEUTIC EXERCISES: CPT | Mod: CQ

## 2025-02-10 PROCEDURE — 84134 ASSAY OF PREALBUMIN: CPT

## 2025-02-10 PROCEDURE — 36415 COLL VENOUS BLD VENIPUNCTURE: CPT | Performed by: SURGERY

## 2025-02-10 PROCEDURE — 87040 BLOOD CULTURE FOR BACTERIA: CPT

## 2025-02-10 PROCEDURE — 99900026 HC AIRWAY MAINTENANCE (STAT)

## 2025-02-10 PROCEDURE — 82803 BLOOD GASES ANY COMBINATION: CPT

## 2025-02-10 PROCEDURE — 94760 N-INVAS EAR/PLS OXIMETRY 1: CPT

## 2025-02-10 PROCEDURE — 99900031 HC PATIENT EDUCATION (STAT)

## 2025-02-10 PROCEDURE — 99900035 HC TECH TIME PER 15 MIN (STAT)

## 2025-02-10 RX ORDER — CEFAZOLIN 2 G/1
2 INJECTION, POWDER, FOR SOLUTION INTRAMUSCULAR; INTRAVENOUS ONCE
Status: DISCONTINUED | OUTPATIENT
Start: 2025-02-10 | End: 2025-02-10

## 2025-02-10 RX ORDER — CEFAZOLIN 2 G/1
2 INJECTION, POWDER, FOR SOLUTION INTRAMUSCULAR; INTRAVENOUS
Status: DISCONTINUED | OUTPATIENT
Start: 2025-02-10 | End: 2025-02-20

## 2025-02-10 RX ORDER — CEFEPIME HYDROCHLORIDE 1 G/1
1 INJECTION, POWDER, FOR SOLUTION INTRAMUSCULAR; INTRAVENOUS
Status: COMPLETED | OUTPATIENT
Start: 2025-02-10 | End: 2025-02-15

## 2025-02-10 RX ADMIN — INSULIN ASPART 3 UNITS: 100 INJECTION, SOLUTION INTRAVENOUS; SUBCUTANEOUS at 12:02

## 2025-02-10 RX ADMIN — FOLIC ACID 1 MG: 1 TABLET ORAL at 08:02

## 2025-02-10 RX ADMIN — POLYETHYLENE GLYCOL 3350 17 G: 17 POWDER, FOR SOLUTION ORAL at 08:02

## 2025-02-10 RX ADMIN — METOPROLOL TARTRATE 50 MG: 50 TABLET, FILM COATED ORAL at 07:02

## 2025-02-10 RX ADMIN — METHYLPHENIDATE HYDROCHLORIDE 10 MG: 5 TABLET ORAL at 11:02

## 2025-02-10 RX ADMIN — FAMOTIDINE 20 MG: 20 TABLET, FILM COATED ORAL at 08:02

## 2025-02-10 RX ADMIN — AMANTADINE HYDROCHLORIDE 100 MG: 50 SOLUTION ORAL at 08:02

## 2025-02-10 RX ADMIN — INSULIN ASPART 3 UNITS: 100 INJECTION, SOLUTION INTRAVENOUS; SUBCUTANEOUS at 07:02

## 2025-02-10 RX ADMIN — ENOXAPARIN SODIUM 40 MG: 40 INJECTION SUBCUTANEOUS at 08:02

## 2025-02-10 RX ADMIN — VANCOMYCIN HYDROCHLORIDE 1000 MG: 1 INJECTION, POWDER, LYOPHILIZED, FOR SOLUTION INTRAVENOUS at 02:02

## 2025-02-10 RX ADMIN — INSULIN ASPART 8 UNITS: 100 INJECTION, SOLUTION INTRAVENOUS; SUBCUTANEOUS at 03:02

## 2025-02-10 RX ADMIN — INSULIN ASPART 8 UNITS: 100 INJECTION, SOLUTION INTRAVENOUS; SUBCUTANEOUS at 08:02

## 2025-02-10 RX ADMIN — VANCOMYCIN HYDROCHLORIDE 1000 MG: 1 INJECTION, POWDER, LYOPHILIZED, FOR SOLUTION INTRAVENOUS at 03:02

## 2025-02-10 RX ADMIN — THERA TABS 1 TABLET: TAB at 08:02

## 2025-02-10 RX ADMIN — THIAMINE HCL TAB 100 MG 100 MG: 100 TAB at 08:02

## 2025-02-10 RX ADMIN — LEVETIRACETAM 500 MG: 100 INJECTION, SOLUTION INTRAVENOUS at 08:02

## 2025-02-10 RX ADMIN — METHYLPHENIDATE HYDROCHLORIDE 10 MG: 5 TABLET ORAL at 06:02

## 2025-02-10 RX ADMIN — INSULIN ASPART 8 UNITS: 100 INJECTION, SOLUTION INTRAVENOUS; SUBCUTANEOUS at 11:02

## 2025-02-10 RX ADMIN — INSULIN GLARGINE 20 UNITS: 100 INJECTION, SOLUTION SUBCUTANEOUS at 08:02

## 2025-02-10 RX ADMIN — CEFEPIME 1 G: 1 INJECTION, POWDER, FOR SOLUTION INTRAMUSCULAR; INTRAVENOUS at 01:02

## 2025-02-10 RX ADMIN — DOCUSATE SODIUM LIQUID 100 MG: 100 LIQUID ORAL at 08:02

## 2025-02-10 RX ADMIN — ACETAMINOPHEN 325MG 650 MG: 325 TABLET ORAL at 10:02

## 2025-02-10 RX ADMIN — METHOCARBAMOL 500 MG: 500 TABLET ORAL at 01:02

## 2025-02-10 RX ADMIN — AMLODIPINE BESYLATE 10 MG: 5 TABLET ORAL at 08:02

## 2025-02-10 RX ADMIN — ACETAMINOPHEN 325MG 650 MG: 325 TABLET ORAL at 07:02

## 2025-02-10 RX ADMIN — METOPROLOL TARTRATE 50 MG: 50 TABLET, FILM COATED ORAL at 08:02

## 2025-02-10 RX ADMIN — OXYCODONE HYDROCHLORIDE 5 MG: 5 TABLET ORAL at 07:02

## 2025-02-10 RX ADMIN — CEFEPIME 1 G: 1 INJECTION, POWDER, FOR SOLUTION INTRAMUSCULAR; INTRAVENOUS at 07:02

## 2025-02-10 NOTE — PROGRESS NOTES
POD#6 right craniectomy for SDH  He is currently intubated, mechanically ventilated  No sedation  Na 144    AFVSS  Pupils 3mm bilateral, reactive brisk  He does open his eyes to sternal rub  He is intermittently following commands in bilateral LE, less today than previous exam  Dressing c/d/I  Flap full but not tense  Drain output 17/24hrs    Plan: I will dc his drain today  OK to leave incision open to air  Continue Q4 hour neuro exams  Continue BP parameters below 150/90  Keppra BID  SCDs and lovenox for DVT prophylaxis

## 2025-02-10 NOTE — PROGRESS NOTES
Pharmacokinetic Initial Assessment: IV Vancomycin    Assessment/Plan:    Initiate intravenous vancomycin with loading dose of 2000 mg once followed by a maintenance dose of vancomycin 1250mg IV every 12 hours.  Desired empiric serum trough concentration is 10 to 20 mcg/mL.  Draw vancomycin trough level 60 min prior to 5th dose on 02/12 at approximately 1300.  Pharmacy will continue to follow and monitor vancomycin.      Please contact pharmacy at extension [phone] with any questions regarding this assessment.     Thank you for the consult,   Dieudonne Escobar       Patient brief summary:  Ezequiel Ramires is a 64 y.o. male initiated on antimicrobial therapy with IV Vancomycin.    Drug Allergies:   Review of patient's allergies indicates:  No Known Allergies    Actual Body Weight:   Wt Readings from Last 1 Encounters:   02/05/25 80 kg (176 lb 5.9 oz)       Renal Function:   Estimated Creatinine Clearance: 102.4 mL/min (based on SCr of 0.8 mg/dL).    CBC (last 72 hours):  Recent Labs   Lab Result Units 02/08/25  0250 02/09/25  0422 02/10/25  0307   WBC x10(3)/mcL 7.81 6.76 8.81   Hgb g/dL 10.1* 10.2* 11.5*   Hct % 32.5* 32.2* 36.3*   Platelet x10(3)/mcL 211 220 273   Mono % % 10.5 9.9 11.5   Eos % % 0.8 1.0 0.9   Basophil % % 0.6 0.6 0.7       Metabolic Panel (last 72 hours):  Recent Labs   Lab Result Units 02/07/25  1810 02/08/25  0029 02/08/25  0250 02/08/25  0303 02/08/25  0701 02/08/25  1159 02/08/25  1816 02/08/25  2357 02/09/25  0401 02/09/25  0422 02/09/25  1245 02/09/25  1805 02/09/25  2245 02/10/25  0307 02/10/25  0650 02/10/25  0712 02/10/25  1043   Sodium mmol/L 155* 156* 156*  --  154* 152* 152* 149*  --  149* 148* 146* 144 144 144  --  144   Sodium, Blood Gas mmol/L  --   --   --  153*  --   --   --   --  146*  --   --   --   --   --   --  143  --    Potassium mmol/L  --   --  3.3*  --   --   --   --   --   --  3.7  --   --   --  4.0  --   --   --    Potassium, Blood Gas mmol/L  --   --   --  3.3*  --   --   --   --   3.5  --   --   --   --   --   --  3.9  --    Chloride mmol/L  --   --  125*  --   --   --   --   --   --  115*  --   --   --  110*  --   --   --    CO2 mmol/L  --   --  23  --   --   --   --   --   --  25  --   --   --  27  --   --   --    Glucose mg/dL  --   --  163*  --   --   --   --   --   --  332*  --   --   --  305*  --   --   --    Blood Urea Nitrogen mg/dL  --   --  19.4  --   --   --   --   --   --  19.3  --   --   --  24.7  --   --   --    Creatinine mg/dL  --   --  0.71*  --   --   --   --   --   --  0.77  --   --   --  0.80  --   --   --    Albumin g/dL  --   --  2.5*  --   --   --   --   --   --  2.5*  --   --   --  2.8*  --   --   --    Bilirubin Total mg/dL  --   --  0.3  --   --   --   --   --   --  0.4  --   --   --  0.6  --   --   --    ALP unit/L  --   --  64  --   --   --   --   --   --  68  --   --   --  73  --   --   --    AST unit/L  --   --  38*  --   --   --   --   --   --  31  --   --   --  18  --   --   --    ALT unit/L  --   --  38  --   --   --   --   --   --  40  --   --   --  33  --   --   --        Microbiologic Results:  Microbiology Results (last 7 days)       Procedure Component Value Units Date/Time    Blood Culture [[id number]] Collected: 02/10/25 1137    Order Status: Resulted Specimen: Blood Updated: 02/10/25 1224    Respiratory Culture [[id number]] Collected: 02/10/25 1057    Order Status: Resulted Specimen: Sputum from Endotracheal Aspirate Updated: 02/10/25 1122    Blood Culture [[id number]] Collected: 02/10/25 1043    Order Status: Resulted Specimen: Blood Updated: 02/10/25 1110    Tissue Culture - Aerobic [[id number]] Collected: 02/04/25 1613    Order Status: Completed Specimen: Bone from Skull Updated: 02/09/25 1112     Tissue - Aerobic Culture No Growth    Anaerobic Culture [[id number]] Collected: 02/04/25 1613    Order Status: Completed Specimen: Bone from Skull Updated: 02/07/25 0747     Anaerobe Culture No Anaerobes Isolated

## 2025-02-10 NOTE — CONSULTS
Inpatient consult to Cardiology  Consult performed by: Dandy Schwarz ANP  Consult ordered by: Jose Armando Monroe MD  Reason for consult: VFib Arrest        [facility] *    Cardiology  Consult Note    Patient Name: Ezequiel Ramires  MRN: [id number]  Admission Date: 2/4/2025  Hospital Length of Stay: 6 days  Code Status: Full Code   Attending Provider: Maik Moreno Jr., *   Consulting Provider: HEBERT Wilkerson  Primary Care Physician: Davy Stacy MD  Principal Problem:<principal problem not specified>    Patient information was obtained from past medical records and ER records.     Subjective:     Chief Complaint/Reason for Consult: Bigeminy    HPI: Mr. Ramires is a 63 y/o male who is unknown to [facility]. The patient initially presented to [facility] on 2.4.25 via Transfer as a Trauma from a [facility] for a Noted Bilateral SDH with 0.8 R to L Midline Shift, SAH, L Occipital Fracture and a R 4th Rib Fracture. The patients last known normal was around 7:30 PM the night prior and when she woke up she found him in Vomit on the Floor. On 2.4.25 he underwent an Emergent Crani. He was stabilized and monitored in the Trauma ICU. He was being worked up and planned for a PEG/Tracheostomy, however, he developed Bigeminy on EKG and this was postponed. [facility] was consulted for Recommendations.     PMH: CAD, DM II, HLD, ETOH Abuse, Psoriasis, Hx of MI, Anxiety, OA  PSH: Craniotomy  Family History: Denies Family History of Heart Disease  Social History: Denies Tobacco Use/Former Smoker; + 1/2 Pint ETOH Daily; Denies Illicit Drug Use    Previous Cardiac Diagnostics: None     Review of patient's allergies indicates:  No Known Allergies  No current facility-administered medications on file prior to encounter.     Current Outpatient Medications on File Prior to Encounter   Medication Sig    amLODIPine (NORVASC) 5 MG tablet Take 1 tablet by mouth once daily.    ezetimibe (ZETIA) 10 mg tablet Take 1 tablet by mouth  once daily.    icosapent ethyL (VASCEPA) 1 gram Cap Take 2,000 mg by mouth every evening.    losartan (COZAAR) 100 MG tablet Take 1 tablet by mouth every evening.    metoprolol tartrate (LOPRESSOR) 50 MG tablet Take 1 tablet by mouth 2 (two) times daily.    prasugreL HCl (EFFIENT) 10 mg Tab Take 10 mg by mouth once daily.    rosuvastatin (CRESTOR) 10 MG tablet Take 1 tablet by mouth every evening.    aspirin (ECOTRIN) 81 MG EC tablet Take 1 tablet by mouth every morning.    semaglutide (OZEMPIC SUBQ) Inject into the skin.     Review of Systems   Unable to perform ROS: Intubated     Objective:     Vital Signs (Most Recent):  Temp: (!) 102.7 °F (39.3 °C) (02/10/25 1007)  Pulse: 107 (02/10/25 1000)  Resp: (!) 31 (02/10/25 1000)  BP: 126/86 (02/10/25 1000)  SpO2: 95 % (02/10/25 1000) Vital Signs (24h Range):  Temp:  [98.8 °F (37.1 °C)-102.7 °F (39.3 °C)] 102.7 °F (39.3 °C)  Pulse:  [] 107  Resp:  [19-34] 31  SpO2:  [94 %-95 %] 95 %  BP: (111-167)/(57-86) 126/86  Arterial Line BP: (123-190)/(52-73) 162/66   Weight: 80 kg (176 lb 5.9 oz)  Body mass index is 23.91 kg/m².  SpO2: 95 %       Intake/Output Summary (Last 24 hours) at 2/10/2025 1409  Last data filed at 2/10/2025 0400  Gross per 24 hour   Intake 2580 ml   Output 2967.5 ml   Net -387.5 ml     Lines/Drains/Airways       Drain  Duration                  NG/OG Tube 02/04/25 orogastric Center mouth 6 days         Closed/Suction Drain 02/04/25 1600 Tube - 1 Posterior;Right;Superior Other (Comment) Bulb 10 Fr. 5 days    Male External Urinary Catheter 02/06/25 2300 Small 3 days              Airway  Duration                  Airway - Non-Surgical 02/04/25 1200 Endotracheal Tube 6 days              Peripheral Intravenous Line  Duration                  Peripheral IV - Single Lumen 02/04/25 1322 18 G Left Antecubital 6 days         Peripheral IV - Single Lumen 02/04/25 1730 20 G;18 G Right Antecubital 5 days         Peripheral IV - Single Lumen 02/08/25 1600 18 G  Anterior;Right Upper Arm 1 day                  Significant Labs:   Chemistries:   Recent Labs   Lab 02/06/25  0352 02/06/25  0654 02/07/25  0222 02/07/25  0743 02/08/25  0250 02/08/25  0701 02/09/25  0422 02/09/25  1245 02/09/25  1805 02/09/25  2245 02/10/25  0307 02/10/25  0650 02/10/25  1043   *   < > 156*   < > 156*   < > 149*   < > 146* 144 144 144 144   K 3.5  --  3.7  --  3.3*  --  3.7  --   --   --  4.0  --   --    *  --  126*  --  125*  --  115*  --   --   --  110*  --   --    CO2 22*  --  22*  --  23  --  25  --   --   --  27  --   --    BUN 12.9  --  18.5  --  19.4  --  19.3  --   --   --  24.7  --   --    CREATININE 0.74  --  0.71*  --  0.71*  --  0.77  --   --   --  0.80  --   --    CALCIUM 8.2*  --  7.9*  --  8.2*  --  8.0*  --   --   --  8.6*  --   --    BILITOT 0.5  --  0.4  --  0.3  --  0.4  --   --   --  0.6  --   --    ALKPHOS 58  --  53  --  64  --  68  --   --   --  73  --   --    ALT 22  --  22  --  38  --  40  --   --   --  33  --   --    AST 27  --  24  --  38*  --  31  --   --   --  18  --   --    GLUCOSE 221*  --  160*  --  163*  --  332*  --   --   --  305*  --   --     < > = values in this interval not displayed.        CBC/Anemia Labs: Coags:    Recent Labs   Lab 02/08/25  0250 02/09/25  0422 02/10/25  0307   WBC 7.81 6.76 8.81   HGB 10.1* 10.2* 11.5*   HCT 32.5* 32.2* 36.3*    220 273   MCV 94.8* 93.6 92.4   RDW 14.6 14.7 14.6    Recent Labs   Lab 02/04/25  0527 02/04/25  0636 02/04/25  1333   INR 1.1 1 1.2   APTT  --   --  35.0*        Significant Imaging:  Imaging Results              CT Head Without Contrast (Final result)  Result time 02/04/25 14:04:09      Final result by Jc Farooq MD (02/04/25 14:04:09)                   Impression:      1. Stable appearance of the bifrontal hemorrhagic contusions, left cerebral intraparenchymal hemorrhage, predominantly right frontal subdural hematoma and extensive multifocal subarachnoid blood products with trace  intraventricular blood products.  There has been no new hemorrhage.  The 8 mm of right to left midline shift is stable from the prior study.  2. Unchanged nondisplaced left occipital bone fracture with extension into foramen magnum.      Electronically signed by: Jc Farooq MD  Date:    02/04/2025  Time:    14:04               Narrative:    EXAMINATION:  CT HEAD WITHOUT CONTRAST    CLINICAL HISTORY:  Subdural hemorrhage;    TECHNIQUE:  Axial images of the head were obtained without IV contrast administration.  Coronal and sagittal reconstructions were provided.  Three dimensional and MIP images were obtained and evaluated.  Total DLP was 926 mGy-cm. Dose lowering technique and automated exposure control were utilized for this exam.    COMPARISON:  CT of the head 02/04/2025.    FINDINGS:  The inferior bilateral lower lobe hemorrhagic contusions are unchanged in size.  The intraparenchymal hematoma in the inferior cerebellar region is unchanged.  The small volume intraventricular blood products in the bilateral lateral ventricles is unchanged.  The subarachnoid blood products in the skull base, bilateral sylvian fissure, bilateral frontal lobes is unchanged.  The right predominantly frontal subdural hematoma is unchanged.  This measures 9 mm in maximum thickness.  The right to left midline shift measuring 8 mm is stable from the prior study.  The suprasellar cistern is patent.  There is no downward transtentorial herniation.    There is a nondisplaced fracture of the left occipital bone extending inferiorly into foramen magnum best visualized on series 4, image 5.  The bilateral orbits are normal.  There is mucoperiosteal thickening of the bilateral maxillary sinuses.                                       X-Ray Chest AP Portable (Final result)  Result time 02/04/25 13:05:36      Final result by Alex Abdi MD (02/04/25 13:05:36)                   Impression:      Suspect some mild left basilar  atelectasis.      Electronically signed by: Alex Abdi  Date:    02/04/2025  Time:    13:05               Narrative:    EXAMINATION:  XR CHEST AP PORTABLE    CLINICAL HISTORY:  Transfer, intubated;    COMPARISON:  No priors    FINDINGS:  Frontal view of the chest was obtained. Endotracheal tube tip midthoracic trachea.  Enteric tube extends into the stomach.  The heart is not enlarged.  Suspect some mild left basilar atelectasis.  Lungs otherwise grossly clear.  No pneumothorax.                                    EKG:       Telemetry: ST (Low 100s) with Bigeminy     Physical Exam  Constitutional:       General: He is not in acute distress.     Appearance: He is ill-appearing.      Comments: Vented/Sedated   HENT:      Head: Normocephalic.      Mouth/Throat:      Mouth: Mucous membranes are dry.   Cardiovascular:      Rate and Rhythm: Tachycardia present. Rhythm irregular.      Pulses: Normal pulses.      Heart sounds: Murmur heard.   Pulmonary:      Effort: Pulmonary effort is normal. No respiratory distress.      Breath sounds: Examination of the right-lower field reveals decreased breath sounds. Examination of the left-lower field reveals decreased breath sounds. Decreased breath sounds present.      Comments: Ventilator Associated Breath Sounds  Vent Mode: SIMV  Oxygen Concentration (%):  [30] 30  Resp Rate Total:  [20 br/min-31 br/min] 31 br/min  Vt Set:  [500 mL] 500 mL  PEEP/CPAP:  [5 cmH20] 5 cmH20  Pressure Support:  [10 cmH20] 10 cmH20  Mean Airway Pressure:  [8 cmH20-10 cmH20] 9 cmH20  Abdominal:      Palpations: Abdomen is soft.   Skin:     General: Skin is warm and dry.   Neurological:      Comments: Vented/Sedated       Home Medications:   No current facility-administered medications on file prior to encounter.     Current Outpatient Medications on File Prior to Encounter   Medication Sig Dispense Refill    amLODIPine (NORVASC) 5 MG tablet Take 1 tablet by mouth once daily.      ezetimibe (ZETIA) 10  mg tablet Take 1 tablet by mouth once daily.      icosapent ethyL (VASCEPA) 1 gram Cap Take 2,000 mg by mouth every evening.      losartan (COZAAR) 100 MG tablet Take 1 tablet by mouth every evening.      metoprolol tartrate (LOPRESSOR) 50 MG tablet Take 1 tablet by mouth 2 (two) times daily.      prasugreL HCl (EFFIENT) 10 mg Tab Take 10 mg by mouth once daily.      rosuvastatin (CRESTOR) 10 MG tablet Take 1 tablet by mouth every evening.      aspirin (ECOTRIN) 81 MG EC tablet Take 1 tablet by mouth every morning.      semaglutide (OZEMPIC SUBQ) Inject into the skin.       Current Schedule Inpatient Medications:   amantadine HCL  100 mg Per NG tube BID    amLODIPine  10 mg Per OG tube Daily    ceFAZolin (Ancef) IV (PEDS and ADULTS)  2 g Intravenous Once    ceFEPime IV (PEDS and ADULTS)  1 g Intravenous Q6H    docusate  100 mg Per G Tube BID    enoxparin  40 mg Subcutaneous Q12H (prophylaxis, 0900/2100)    famotidine  20 mg Per OG tube BID    folic acid  1 mg Per OG tube Daily    insulin glargine U-100  20 Units Subcutaneous BID    levETIRAcetam (Keppra) IV (PEDS and ADULTS)  500 mg Intravenous Q12H    losartan  100 mg Per OG tube QHS    methocarbamoL  500 mg Per OG tube Q8H    methylphenidate HCl  10 mg Per OG tube BID WM    metoprolol tartrate  50 mg Per OG tube BID    multivitamin  1 tablet Per OG tube Daily    polyethylene glycol  17 g Per OG tube BID    thiamine  100 mg Per OG tube Daily    vancomycin (VANCOCIN) 1,000 mg in D5W 250 mL IVPB (admixture device)  1,000 mg Intravenous Once    Followed by    vancomycin (VANCOCIN) 1,000 mg in D5W 250 mL IVPB (admixture device)  1,000 mg Intravenous Once     Continuous Infusions:   D10W   Intravenous Continuous PRN         Assessment:   ST with Adriana  Alleged Fall/Trauma (PT Found Down by Bed in [place]) resulting in (2.4.25) Acute Non-Displaced R 4th Rib Fracture, Tree-in-bud Nodularities in the Bilateral Lower Lobes which could be do to Infectious  Bronchiolitis/Aspiration, 4mm Anterolisthesis of C4 on C5 due to Degeneration vs Trauma, Non-Displaced L Occipital Bone Fracture extending Inferiorly to the Level of the L Foramen Magnum, Bilateral SDH, Bilateral IPH, Bilateral SAH, Small Layering Hemorrhage in the L Occipital Horn and 8mm R to L Midline Shift    - s/p Emergent Crani (2.9.25)   Acute Hypoxemic Respiratory Failure requiring Intubation/Ventilation   CAD/Unspecified (No Reports for Review)   DM II  HLD  ETOH Abuse  Psoriasis  Hx of MI  Anxiety  OA  No Hx of GIB     Plan:   EKG Reviewed   Continue BB   Keep K > 4.0 and Mg > 2.0   Defer Lytes to Primary Team   ECHO Today  Will F/U ECHO Results  Labs and EKG in AM: CBC, CMP and Mg     Thank you for your consult.     Dandy Schwarz, HEBERT  Cardiology  [facility]     Physician addendum:  I have seen and examined this patient as a split-shared visit with the HERMILA d/t complicated medical management of above problems written in assessment and high acuity requiring physician expertise in medical decision-making. I performed the substantive portion of the history and exam. Above medical decision-making is also formulated by me.    Cardiovascular exam:  S1, S2  Lungs:  fine crackles at bases.  Extremities:  + edema bilaterally    Plan:  Medications as above.  Continue beta-blocker.  .We will check echo today.  Continue supportive therapy.     Naga Hamilton MD  Cardiologist

## 2025-02-10 NOTE — PHYSICIAN QUERY
Question: Please specify diagnosis or diagnoses associated with the following clinical findings in the query.    Provider Query Response:  Traumatic Cerebral Edema

## 2025-02-10 NOTE — PLAN OF CARE
Trauma multidisciplinary rounding; cards consult, will need T&P and neuro rehab.  anticipates T&P in the next few days.   Spoke with wife Reina who is at bedside and she selects [facility].   Cardiologist in to see pt now.   Magaly at [facility] will be sent initial info once T&P is done and pt is close to being medically stable    1335 spoke with Magaly at [facility] who is off this week . Will send referral to [facility] intake

## 2025-02-10 NOTE — PT/OT/SLP PROGRESS
Physical Therapy Treatment    Patient Name:  Ezequiel Ramires   MRN:  [id number]    Recommendations:     Discharge therapy intensity: High Intensity Therapy   Discharge Equipment Recommendations:  (TBD)  Barriers to discharge: Impaired mobility, Ongoing medical needs, and placement.    Assessment:     Ezequiel Ramires is a 64 y.o. male admitted with a medical diagnosis of B SDH s/p R craniectomy, SAH, IPH, L occipital bone fx, R 4th rib fx.  He presents with the following impairments/functional limitations: weakness, impaired self care skills, impaired functional mobility, decreased lower extremity function, impaired endurance, impaired balance.    Mobility to EOB held per RN 2/2 increased lethargy and cardiology consult; PROM perf to BLE.    Rehab Prognosis: Fair; patient would benefit from acute skilled PT services to address these deficits and reach maximum level of function.    Recent Surgery: Procedure(s) (LRB):  CRANIOTOMY, FOR SUBDURAL HEMATOMA EVACUATION (Right) 6 Days Post-Op    Plan:     During this hospitalization, patient would benefit from acute PT services 6 x/week to address the identified rehab impairments via therapeutic activities, therapeutic exercises and progress toward the following goals:    Plan of Care Expires:  03/08/25    Subjective     Chief Complaint: LEONEL  Patient/Family Comments/goals:   Pain/Comfort:         Objective:     Communicated with RN prior to session.  Patient found supine with arterial line, blood pressure cuff, peripheral IV, PureWick, pulse ox (continuous), telemetry, ventilator, RORO drain, NG tube, SCD upon PT entry to room.     General Precautions: Standard, fall (<140, R bone flap pxn)  Orthopedic Precautions: N/A  Braces:  (helmet)  Respiratory Status: Ventilator SIMV; 30% FiO2; 5 PEEP; O2 94%  Blood Pressure: 126/86  Skin Integrity: Visible skin intact    Therapeutic Exercises:  PROM perf to BLE in all planes x 15 reps    Education:  Patient and spouse were provided with verbal  education education regarding PT role/goals/POC, fall prevention, and safety awareness.  Additional teaching is warranted.     Patient left supine with all lines intact, call button in reach, wedge under R side, pressure relief boots, RN notified, and wife present    GOALS:   Multidisciplinary Problems       Physical Therapy Goals          Problem: Physical Therapy    Goal Priority Disciplines Outcome Interventions   Physical Therapy Goal     PT, PT/OT Progressing    Description: Goals to be met by: d/c     Patient will increase functional independence with mobility by performin. Supine to sit with MInimal Assistance  2. Sit to supine with MInimal Assistance  3. Sit to stand transfer with Minimal Assistance  4. Bed to chair transfer with Minimal Assistance using LRAD vs pivot  5. Gait to be assessed as appropriate                         Time Tracking:     PT Received On: 02/10/25  PT Start Time: 1046     PT Stop Time: 1057  PT Total Time (min): 11 min     Billable Minutes: Therapeutic Exercise 1    Treatment Type: Treatment  PT/PTA: PTA     Number of PTA visits since last PT visit: 2     02/10/2025

## 2025-02-10 NOTE — PT/OT/SLP PROGRESS
Occupational Therapy   Treatment    Name: Ezequiel Ramires  MRN: [id number]  Admitting Diagnosis:  B SDH s/p R craniectomy, SAH, IPH, L occipital bone fx, R 4th rib fx   6 Days Post-Op    Recommendations:     Recommended therapy intensity at discharge: High Intensity Therapy Pending Progress  Discharge Equipment Recommendations:   (TBD as patient progresses)  Barriers to discharge:       Assessment:     Ezequiel Ramires is a 64 y.o. male with a medical diagnosis of B SDH s/p R craniectomy, SAH, IPH, L occipital bone fx, R 4th rib fx .  He presents with increased lethargy, per RN cardiology consulted, mobility to EOB held at this time. No withdrawal to pain noted.  Performance deficits affecting function are weakness, impaired endurance, impaired self care skills, impaired functional mobility, impaired balance.     Rehab Prognosis:  Fair; patient would benefit from acute skilled OT services to address these deficits and reach maximum level of function.       Plan:     Patient to be seen 5 x/week to address the above listed problems via self-care/home management, therapeutic activities, therapeutic exercises  Plan of Care Expires: 03/07/25  Plan of Care Reviewed with: patient    Subjective     Pain/Comfort:      Objective:     Communicated with: RN prior to session.  Patient found HOB elevated with   upon OT entry to room.    General Precautions: Standard, fall    Orthopedic Precautions:N/A  Braces:  (helmet)  Respiratory Status: Ventilator  Vital Signs: Blood Pressure: 112/69   108HR  94  SIMV, 30fio2, 5 PEEP     Occupational Performance:   Supported Long sit- Total A, no head control noted, eyes closed throughout long sit despite max stimulation and sternal rubbing. No withdrawal to pain noted.   PROM/Stretching performed to B UE, noted swelling in B UE (Hands)  [place]ka A required during grooming task (washing face) no initiation noted.   Pt. Repositioned on wedge with B UE elevated.   Therapeutic Positioning    OT interventions  performed during the course of today's session in an effort to prevent and/or reduce acquired pressure injuries:   Therapeutic positioning was provided at the conclusion of session to offload all bony prominences for the prevention and/or reduction of pressure injuries      Patient left HOB elevated with all lines intact and call button in reach.    GOALS:   Multidisciplinary Problems       Occupational Therapy Goals          Problem: Occupational Therapy    Goal Priority Disciplines Outcome Interventions   Occupational Therapy Goal     OT, PT/OT Progressing    Description: LTG: Pt will perform basic ADLs and ADL t/fs with min A using LRAD by d/c.    STG: to be met by 3/7/25  1. Pt will follow 100% of simple one step commands across 3 sessions  2. Pt will perform grooming EOB with min A.   3. Pt will perform functional grasp/reach/release of items >80% of trials in prep for increased participation in ADL tasks.   4. Pt will perform sit<>stand with mod A in prep for ADL t/fs.  5. Pt will perform bedside commode transfer with mod A.                         Time Tracking:     OT Date of Treatment: 02/10/25  OT Start Time: 0833  OT Stop Time: 0843  OT Total Time (min): 10 min    Billable Minutes:Therapeutic Activity 1    OT/WENDI: WENDI     Number of WENDI visits since last OT visit: 2    2/10/2025

## 2025-02-10 NOTE — PROGRESS NOTES
Trauma ICU Progress Note    Patient Information:   Patient Name: Ezequiel Ramires                   : 1960     MRN: [id number]   Date of Admission: 2025  Code Status: Full Code  Date of Exam: 02/10/2025  HD#6  POD#6 Days Post-Op  Attending Provider: Maik Moreno Jr., *  Admission Summary:   Patient is a 64 year old  male with PMH of CAD, HTN, DM, HLD, alcoholism, arthritis, anxiety, MI who presents as a transfer from [facility] with bilateral SDH with 0.8 right to left midline shift, SAH, left occipital fracture, right 4th rib fracture. Report per wife patient was last seen normal around 1930 yesterday, she woke this morning to find him on the ground next to the bed with vomit on the floor, he was taken to the OSH at around 0500     Interval history:    AF HDS. NAEO. On minimal vent settings. Planned tracheostomy/PEG tube postponed due to new bigeminy pattern on EKG - Cardiology consulted.         Consults:   Consults: Neurosurgery Injuries:  1. Acute nondisplaced R 4th rib fracture  2. Tree-in-bud nodularities in the bilateral lower lobes which could be due to infectious bronchiolitis or aspiration   3. 4 mm anterolisthesis of C4 on C5 due to degeneration versus trauma  4. Nondisplaced left occipital bone fracture extending inferiorly to the level of the foramen magnum   5. Bilateral SDH  6. Bilateral IPH  7. Bilateral SAH  8. Small layering hemorrhage in the left occipital horn   9. 8 mm right to left midline shift    [x]Problems list reviewed  [x]Tertiary exam performed Operations/Procedures:  Crani 25     Past medical history:  HTN  HLD  DMII  Psoriasis  CAD s/p stents  MI    Medications: [x] Medications reviewed/updated   Home Meds:    Current Outpatient Medications   Medication Instructions    amLODIPine (NORVASC) 5 MG tablet 1 tablet, Daily    aspirin (ECOTRIN) 81 MG EC tablet 1 tablet, Oral, Every morning    ezetimibe (ZETIA) 10 mg tablet 1 tablet, Daily    icosapent ethyL (VASCEPA) 2,000 mg,  Nightly    losartan (COZAAR) 100 MG tablet 1 tablet, Nightly    metoprolol tartrate (LOPRESSOR) 50 MG tablet 1 tablet, 2 times daily    prasugreL HCl (EFFIENT) 10 mg, Daily    rosuvastatin (CRESTOR) 10 MG tablet 1 tablet, Nightly    semaglutide (OZEMPIC SUBQ) Subcutaneous      Scheduled Meds:    amantadine HCL  100 mg Per NG tube BID    amLODIPine  10 mg Per OG tube Daily    ceFAZolin (Ancef) IV (PEDS and ADULTS)  2 g Intravenous Once    docusate  100 mg Per G Tube BID    enoxparin  40 mg Subcutaneous Q12H (prophylaxis, 0900/2100)    famotidine  20 mg Per OG tube BID    folic acid  1 mg Per OG tube Daily    insulin glargine U-100  20 Units Subcutaneous BID    levETIRAcetam (Keppra) IV (PEDS and ADULTS)  500 mg Intravenous Q12H    losartan  100 mg Per OG tube QHS    methocarbamoL  500 mg Per OG tube Q8H    methylphenidate HCl  10 mg Per OG tube BID WM    metoprolol tartrate  50 mg Per OG tube BID    multivitamin  1 tablet Per OG tube Daily    polyethylene glycol  17 g Per OG tube BID    thiamine  100 mg Per OG tube Daily     Continuous Infusions:    D10W   Intravenous Continuous PRN         PRN Meds:   Current Facility-Administered Medications:     acetaminophen, 650 mg, Rectal, Q4H PRN    acetaminophen, 650 mg, Oral, Q4H PRN    bisacodyL, 10 mg, Rectal, Daily PRN    D10W, , Intravenous, Continuous PRN    dextrose 50%, 12.5 g, Intravenous, PRN    dextrose 50%, 25 g, Intravenous, PRN    enalaprilat, 1.25 mg, Intravenous, Q4H PRN    glucagon (human recombinant), 1 mg, Intramuscular, PRN    hydrALAZINE, 10 mg, Intravenous, Q4H PRN    insulin aspart U-100, 0-10 Units, Subcutaneous, Q4H PRN    labetaloL, 20 mg, Intravenous, Q4H PRN    LORazepam, 2 mg, Oral, Q4H PRN    melatonin, 6 mg, Oral, Nightly PRN    ondansetron, 4 mg, Intravenous, Q6H PRN    oxyCODONE, 5 mg, Oral, Q4H PRN    prochlorperazine, 5 mg, Intravenous, Q6H PRN    Flushing PICC/Midline Protocol, , , Until Discontinued **AND** sodium chloride 0.9%, 10 mL,  "Intravenous, Q12H PRN     Vitals:  VITAL SIGNS: 24 HR MIN & MAX LAST   Temp  Min: 98.8 °F (37.1 °C)  Max: 100.1 °F (37.8 °C)  98.8 °F (37.1 °C)   BP  Min: 111/76  Max: 167/57  116/78    Pulse  Min: 84  Max: 116  107    Resp  Min: 19  Max: 33  (!) 28    SpO2  Min: 92 %  Max: 97 %  95 %      HT: 6' 0.01" (182.9 cm)  WT: 80 kg (176 lb 5.9 oz)  BMI: 23.9   Ideal Body Weight (IBW), Male: 178.06 lb  % Ideal Body Weight, Male (lb): 99.08 %        General  Exam: intubated      Neuro/Psych  GCS: 11T (E 4) (V T) (M 6)  Exam: Patient is following commands moving his toes for me and opening his eyes to command. Crani incision c/d/I Corbin drain +SS drainage  ICP monitor: No  ICP treatment: ICP Treatment: 3% saline   C-Collar: No    Plan:   Bilateral SDH s/p crani-  Keppra x 7 days, HOB > 30 degrees. BP<140.   On amantadine   Holding today ritalin due to EKG changes     HEENT  Exam: NCAT ETT/OGT in place    Plan:   monitoring     Pulmonary  Vitals: Resp  Av.4  Min: 19  Max: 33  SpO2  Av.8 %  Min: 92 %  Max: 97 %    Ventilator/Oxygen Settings:   Vent Mode: SIMV  Vt Set: 500 mL  Set Rate: 18 BPM  Pressure Support: 10 cmH20  I:E Ratio Measured: 1:2.1  Total PEEP: 5 cmH20 Vent Mode: SIMV (02/10/25 0911)  Ventilator Initiated: Yes (25 1240)  Set Rate: 18 BPM (02/10/25 0911)  Vt Set: 500 mL (02/10/25 0911)  Pressure Support: 10 cmH20 (02/10/25 0911)  PEEP/CPAP: 5 cmH20 (02/10/25 0911)  Oxygen Concentration (%): 30 (02/10/25 0911)  Peak Airway Pressure: 22 cmH20 (02/10/25 0911)  Total Ve: 12.9 L/m (02/10/25 0911)  F/VT Ratio<105 (RSBI): (!) 48.19 (25 0300)        ABG:   Recent Labs   Lab 02/10/25  0712   PH 7.510*   PO2 93.0   PCO2 35.0   HCO3 27.9*        CXR:    X-Ray Chest 1 View for Line/Tube Placement    Result Date: 2025  PICC line insertion. Confluent opacities in the left retrocardiac region with silhouetting of the left hemidiaphragm which might be related to an infiltrate/atelectasis. No other change " "Electronically signed by: Serg Mera Date:    2025 Time:    11:33        Rib fractures: right rib fracture  Chest Tube: None     Exam: Coarse BS bilaterally on minimal vent settings     Plan:     Remaining intubated due to protection of airway- will need a trach, potentially   On minimal vent settings   CTM  Incentive Spirometry/RT Treatments: none     Cardiovascular  Vitals: Pulse  Av.4  Min: 84  Max: 116  BP  Min: 111/76  Max: 167/57  No results for input(s): "TROPONINI", "CKTOTAL", "CKMB", "BNP" in the last 168 hours.  Vasoactive Agents: None  Exam: RRR    Plan:   HTN- continue home meds and prns     Renal  Recent Labs     25  0250 25  0422 02/10/25  0307   BUN 19.4 19.3 24.7   CREATININE 0.71* 0.77 0.80       No results for input(s): "LACTIC" in the last 72 hours.    Intake/Output - Last 3 Shifts          0659 02/09 0700  02/10 0659 02/10 0700  02/11 0659    I.V. (mL/kg) 0 (0)      NG/GT 1458 2580     IV Piggyback 100 197.4     Total Intake(mL/kg) 1558 (19.5) 2777.4 (34.7)     Urine (mL/kg/hr) 2550 (1.3) 5450 (2.8)     Drains 45 17.5     Stool  0     Total Output 2595 5467.5     Net -1037 -2690.1            Urine Occurrence  1 x     Stool Occurrence  3 x              Intake/Output Summary (Last 24 hours) at 2/10/2025 0917  Last data filed at 2/10/2025 0400  Gross per 24 hour   Intake 2777.37 ml   Output 4767.5 ml   Net -1990.13 ml         Garcia: no    Plan:   monitor     FEN/GI  Recent Labs     25  0250 25  0701 25  0422 25  1245 25  1805 25  2245 02/10/25  0307 02/10/25  0650   *   < > 149*   < > 146* 144 144 144   K 3.3*  --  3.7  --   --   --  4.0  --    *  --  115*  --   --   --  110*  --    CO2 23  --  25  --   --   --  27  --    CALCIUM 8.2*  --  8.0*  --   --   --  8.6*  --    ALBUMIN 2.5*  --  2.5*  --   --   --  2.8*  --    BILITOT 0.3  --  0.4  --   --   --  0.6  --    AST 38*  --  31  --   --   --  18  " --    ALKPHOS 64  --  68  --   --   --  73  --    ALT 38  --  40  --   --   --  33  --     < > = values in this interval not displayed.       Diet: NPO tube feeds at goal    Last BM: +BM    Abdominal Exam: s/NT/ND +BS    Plan:   Tube feeds at goal   Bowel regimen  Will need a PEG, possibly      Heme/Onc  Recent Labs     25  0250 25  0422 02/10/25  0307   HGB 10.1* 10.2* 11.5*   HCT 32.5* 32.2* 36.3*    220 273       Transfusions (over past 24h): None    Plan:   monitor     ID  Temp  Av.4 °F (37.4 °C)  Min: 98.8 °F (37.1 °C)  Max: 100.1 °F (37.8 °C)      Recent Labs     25  0250 25  0422 02/10/25  0307   WBC 7.81 6.76 8.81       Cultures: Antibiotics:    none 1. none     Plan:   monitor     Endocrine  Recent Labs     25  0250 25  0422 02/10/25  0307   GLUCOSE 163* 332* 305*      Recent Labs     25  1623 25  2041 25  0618 25  1159 25  1650 25  2039 02/10/25  0031 02/10/25  0701   POCTGLUCOSE 266* 256* 396* 304* 312* 325* 295* 272*        Plan:   DMII-continue moderate sliding scale and lantus 20 BID  Insulin treatment: moderate SS 20 lantus BID     Musculoskeletal  Weight bearing status:   RUE: WBAT  LUE: WBAT  RLE: WBAT  LLE: WBAT    Exam: does not have purposeful movement  Plan:   monitor     Wounds  Wounds exam: crani incision c/d/I RORO drain in place  Wound vac: No   Media: none  Plan: local wound care     Precautions  Precautions: Seizure     Prophylaxis  Seizure: Keppra (day 5/7)  DVT: Lovenox  GI: H2B     Lines/drains/airway   Lines/Drains/Airways       Drain  Duration                  NG/OG Tube 25 orogastric Center mouth 6 days         Closed/Suction Drain 25 1600 Tube - 1 Posterior;Right;Superior Other (Comment) Bulb 10 Fr. 5 days    Male External Urinary Catheter 25 2300 Small 3 days              Airway  Duration                  Airway - Non-Surgical 25 1200 Endotracheal Tube 5 days               Peripheral Intravenous Line  Duration                  Peripheral IV - Single Lumen 02/04/25 1322 18 G Left Antecubital 5 days         Peripheral IV - Single Lumen 02/04/25 1730 20 G;18 G Right Antecubital 5 days         Peripheral IV - Single Lumen 02/08/25 1600 18 G Anterior;Right Upper Arm 1 day                    Plan:  Cont all lines    [x]LDA reviewed/updated      Restraints  Face to face evaluation of need for restraints on rounds today:   Currently restrained? No.        Assessment & Disposition:   Problem list:  Active Problem List with Overview Notes    Diagnosis Date Noted    Intraparenchymal hemorrhage of brain 02/05/2025    IVH (intraventricular hemorrhage) 02/05/2025    SDH (subdural hematoma) 02/04/2025    SAH (subarachnoid hemorrhage) 02/04/2025    Closed fracture of one rib of right side 02/04/2025    Closed fracture of left side of occipital bone 02/04/2025       Guarded. Poor prognosis.  Bilateral SDH s/p crani- Keppra x 7 days, HOB > 30 degrees. BP<140. Amantadine. Holding ritalin due to new bigeminy on EKG 2/10.  Remaining intubated due to airway protection  Will need trach - planned for 2/11  On minimal vent settings  HTN-  continue home meds and PRNs  Continue TF. Hold 2/11 for tracheostomy/PEG tube.  Will need trach - 2/11  Lovenox  PT/OT - high intensity therapy  DMII-Moderate SS and 20 of lantus BID         Jose Armando Monroe MD  General Surgery PGY-1  [facility]    Critical Care Time:   42 minutes of critical care was spent on this patient personally by me on the following activities: development of treatment plan with patient and bedside nurse, discussions with consultants, evaluation of patient's response to treatment, examining the patient, ordering and preforming treatments and interventions, ordering and reviewing laboratory studies, ordering and reviewing radiologic studies, and re-evaluation of patient's condition.     Daisy Moreno Jr., MD, MS  Trauma Critical Care  Surgery  [facility]   02/10/2025

## 2025-02-11 LAB
ALBUMIN SERPL-MCNC: 2.5 G/DL (ref 3.4–4.8)
ALBUMIN/GLOB SERPL: 0.8 RATIO (ref 1.1–2)
ALLENS TEST BLOOD GAS (OHS): ABNORMAL
ALP SERPL-CCNC: 56 UNIT/L (ref 40–150)
ALT SERPL-CCNC: 25 UNIT/L (ref 0–55)
ANION GAP SERPL CALC-SCNC: 9 MEQ/L
AST SERPL-CCNC: 16 UNIT/L (ref 5–34)
BASE EXCESS BLD CALC-SCNC: 3.4 MMOL/L (ref -2–2)
BASOPHILS # BLD AUTO: 0.04 X10(3)/MCL
BASOPHILS NFR BLD AUTO: 0.5 %
BILIRUB SERPL-MCNC: 0.6 MG/DL
BLOOD GAS SAMPLE TYPE (OHS): ABNORMAL
BUN SERPL-MCNC: 29.5 MG/DL (ref 8.4–25.7)
CA-I BLD-SCNC: 1.15 MMOL/L (ref 1.12–1.23)
CALCIUM SERPL-MCNC: 7.9 MG/DL (ref 8.8–10)
CHLORIDE SERPL-SCNC: 109 MMOL/L (ref 98–107)
CO2 BLDA-SCNC: 27.8 MMOL/L
CO2 SERPL-SCNC: 25 MMOL/L (ref 23–31)
COHGB MFR BLDA: 2.3 % (ref 0.5–1.5)
CREAT SERPL-MCNC: 0.79 MG/DL (ref 0.72–1.25)
CREAT/UREA NIT SERPL: 37
DRAWN BY BLOOD GAS (OHS): ABNORMAL
EOSINOPHIL # BLD AUTO: 0.11 X10(3)/MCL (ref 0–0.9)
EOSINOPHIL NFR BLD AUTO: 1.5 %
ERYTHROCYTE [DISTWIDTH] IN BLOOD BY AUTOMATED COUNT: 14.7 % (ref 11.5–17)
GFR SERPLBLD CREATININE-BSD FMLA CKD-EPI: >60 ML/MIN/1.73/M2
GLOBULIN SER-MCNC: 3 GM/DL (ref 2.4–3.5)
GLUCOSE SERPL-MCNC: 297 MG/DL (ref 82–115)
HCO3 BLDA-SCNC: 26.7 MMOL/L (ref 22–26)
HCT VFR BLD AUTO: 33.9 % (ref 42–52)
HGB BLD-MCNC: 10.9 G/DL (ref 14–18)
IMM GRANULOCYTES # BLD AUTO: 0.03 X10(3)/MCL (ref 0–0.04)
IMM GRANULOCYTES NFR BLD AUTO: 0.4 %
INHALED O2 CONCENTRATION: 30 %
LYMPHOCYTES # BLD AUTO: 1.39 X10(3)/MCL (ref 0.6–4.6)
LYMPHOCYTES NFR BLD AUTO: 18.4 %
MCH RBC QN AUTO: 29 PG (ref 27–31)
MCHC RBC AUTO-ENTMCNC: 32.2 G/DL (ref 33–36)
MCV RBC AUTO: 90.2 FL (ref 80–94)
MECH RR (OHS): 18 B/MIN
METHGB MFR BLDA: 0.9 % (ref 0.4–1.5)
MODE (OHS): ABNORMAL
MONOCYTES # BLD AUTO: 1.15 X10(3)/MCL (ref 0.1–1.3)
MONOCYTES NFR BLD AUTO: 15.3 %
NEUTROPHILS # BLD AUTO: 4.82 X10(3)/MCL (ref 2.1–9.2)
NEUTROPHILS NFR BLD AUTO: 63.9 %
NRBC BLD AUTO-RTO: 0 %
O2 HB BLOOD GAS (OHS): 94 % (ref 94–97)
OXYGEN DEVICE BLOOD GAS (OHS): ABNORMAL
OXYHGB MFR BLDA: 11.3 G/DL (ref 12–16)
PCO2 BLDA: 35 MMHG (ref 35–45)
PEEP RESPIRATORY: 5 CMH2O
PH BLDA: 7.49 [PH] (ref 7.35–7.45)
PLATELET # BLD AUTO: 278 X10(3)/MCL (ref 130–400)
PMV BLD AUTO: 11.4 FL (ref 7.4–10.4)
PO2 BLDA: 70 MMHG (ref 80–100)
POCT GLUCOSE: 269 MG/DL (ref 70–110)
POCT GLUCOSE: 289 MG/DL (ref 70–110)
POCT GLUCOSE: 315 MG/DL (ref 70–110)
POCT GLUCOSE: 359 MG/DL (ref 70–110)
POTASSIUM BLOOD GAS (OHS): 3.9 MMOL/L (ref 3.5–5)
POTASSIUM SERPL-SCNC: 4.2 MMOL/L (ref 3.5–5.1)
PROT SERPL-MCNC: 5.5 GM/DL (ref 5.8–7.6)
PS (OHS): 10 CMH2O
RBC # BLD AUTO: 3.76 X10(6)/MCL (ref 4.7–6.1)
SAMPLE SITE BLOOD GAS (OHS): ABNORMAL
SAO2 % BLDA: 95.2 %
SODIUM BLOOD GAS (OHS): 140 MMOL/L (ref 137–145)
SODIUM SERPL-SCNC: 143 MMOL/L (ref 136–145)
SPONT+MECH VT ON VENT: 500 ML
WBC # BLD AUTO: 7.54 X10(3)/MCL (ref 4.5–11.5)

## 2025-02-11 PROCEDURE — 63600175 PHARM REV CODE 636 W HCPCS

## 2025-02-11 PROCEDURE — 63600175 PHARM REV CODE 636 W HCPCS: Performed by: SURGERY

## 2025-02-11 PROCEDURE — 85025 COMPLETE CBC W/AUTO DIFF WBC: CPT

## 2025-02-11 PROCEDURE — 99291 CRITICAL CARE FIRST HOUR: CPT | Mod: ,,, | Performed by: SURGERY

## 2025-02-11 PROCEDURE — 94760 N-INVAS EAR/PLS OXIMETRY 1: CPT

## 2025-02-11 PROCEDURE — 25000003 PHARM REV CODE 250: Performed by: SURGERY

## 2025-02-11 PROCEDURE — 99900026 HC AIRWAY MAINTENANCE (STAT)

## 2025-02-11 PROCEDURE — 27100171 HC OXYGEN HIGH FLOW UP TO 24 HOURS

## 2025-02-11 PROCEDURE — 80053 COMPREHEN METABOLIC PANEL: CPT

## 2025-02-11 PROCEDURE — 27200966 HC CLOSED SUCTION SYSTEM

## 2025-02-11 PROCEDURE — 99900035 HC TECH TIME PER 15 MIN (STAT)

## 2025-02-11 PROCEDURE — 0DH63UZ INSERTION OF FEEDING DEVICE INTO STOMACH, PERCUTANEOUS APPROACH: ICD-10-PCS

## 2025-02-11 PROCEDURE — 37799 UNLISTED PX VASCULAR SURGERY: CPT

## 2025-02-11 PROCEDURE — 94761 N-INVAS EAR/PLS OXIMETRY MLT: CPT

## 2025-02-11 PROCEDURE — 25000003 PHARM REV CODE 250

## 2025-02-11 PROCEDURE — 82803 BLOOD GASES ANY COMBINATION: CPT

## 2025-02-11 PROCEDURE — 94003 VENT MGMT INPAT SUBQ DAY: CPT

## 2025-02-11 PROCEDURE — 20800000 HC ICU TRAUMA

## 2025-02-11 PROCEDURE — 36415 COLL VENOUS BLD VENIPUNCTURE: CPT

## 2025-02-11 PROCEDURE — 0B113F4 BYPASS TRACHEA TO CUTANEOUS WITH TRACHEOSTOMY DEVICE, PERCUTANEOUS APPROACH: ICD-10-PCS

## 2025-02-11 PROCEDURE — 99900031 HC PATIENT EDUCATION (STAT)

## 2025-02-11 RX ORDER — PROPOFOL 10 MG/ML
VIAL (ML) INTRAVENOUS CODE/TRAUMA/SEDATION MEDICATION
Status: COMPLETED | OUTPATIENT
Start: 2025-02-11 | End: 2025-02-11

## 2025-02-11 RX ORDER — MIDAZOLAM HYDROCHLORIDE 2 MG/2ML
INJECTION, SOLUTION INTRAMUSCULAR; INTRAVENOUS CODE/TRAUMA/SEDATION MEDICATION
Status: COMPLETED | OUTPATIENT
Start: 2025-02-11 | End: 2025-02-11

## 2025-02-11 RX ORDER — ROCURONIUM BROMIDE 10 MG/ML
100 INJECTION, SOLUTION INTRAVENOUS ONCE
Status: COMPLETED | OUTPATIENT
Start: 2025-02-11 | End: 2025-02-11

## 2025-02-11 RX ORDER — INSULIN GLARGINE 100 [IU]/ML
30 INJECTION, SOLUTION SUBCUTANEOUS 2 TIMES DAILY
Status: DISCONTINUED | OUTPATIENT
Start: 2025-02-11 | End: 2025-02-12

## 2025-02-11 RX ADMIN — VANCOMYCIN HYDROCHLORIDE 1250 MG: 1.25 INJECTION, POWDER, LYOPHILIZED, FOR SOLUTION INTRAVENOUS at 01:02

## 2025-02-11 RX ADMIN — DOCUSATE SODIUM LIQUID 100 MG: 100 LIQUID ORAL at 08:02

## 2025-02-11 RX ADMIN — INSULIN ASPART 6 UNITS: 100 INJECTION, SOLUTION INTRAVENOUS; SUBCUTANEOUS at 05:02

## 2025-02-11 RX ADMIN — LEVETIRACETAM 500 MG: 100 INJECTION, SOLUTION INTRAVENOUS at 08:02

## 2025-02-11 RX ADMIN — LOSARTAN POTASSIUM 100 MG: 50 TABLET, FILM COATED ORAL at 08:02

## 2025-02-11 RX ADMIN — METOPROLOL TARTRATE 50 MG: 50 TABLET, FILM COATED ORAL at 08:02

## 2025-02-11 RX ADMIN — ACETAMINOPHEN 325MG 650 MG: 325 TABLET ORAL at 01:02

## 2025-02-11 RX ADMIN — INSULIN GLARGINE 20 UNITS: 100 INJECTION, SOLUTION SUBCUTANEOUS at 08:02

## 2025-02-11 RX ADMIN — CEFEPIME 1 G: 1 INJECTION, POWDER, FOR SOLUTION INTRAMUSCULAR; INTRAVENOUS at 06:02

## 2025-02-11 RX ADMIN — CEFEPIME 1 G: 1 INJECTION, POWDER, FOR SOLUTION INTRAMUSCULAR; INTRAVENOUS at 02:02

## 2025-02-11 RX ADMIN — METHOCARBAMOL 500 MG: 500 TABLET ORAL at 01:02

## 2025-02-11 RX ADMIN — THERA TABS 1 TABLET: TAB at 08:02

## 2025-02-11 RX ADMIN — ACETAMINOPHEN 325MG 650 MG: 325 TABLET ORAL at 08:02

## 2025-02-11 RX ADMIN — AMANTADINE HYDROCHLORIDE 100 MG: 50 SOLUTION ORAL at 08:02

## 2025-02-11 RX ADMIN — INSULIN ASPART 6 UNITS: 100 INJECTION, SOLUTION INTRAVENOUS; SUBCUTANEOUS at 12:02

## 2025-02-11 RX ADMIN — POLYETHYLENE GLYCOL 3350 17 G: 17 POWDER, FOR SOLUTION ORAL at 08:02

## 2025-02-11 RX ADMIN — MIDAZOLAM HYDROCHLORIDE 5 MG: 1 INJECTION, SOLUTION INTRAMUSCULAR; INTRAVENOUS at 10:02

## 2025-02-11 RX ADMIN — INSULIN ASPART 8 UNITS: 100 INJECTION, SOLUTION INTRAVENOUS; SUBCUTANEOUS at 08:02

## 2025-02-11 RX ADMIN — ROCURONIUM BROMIDE 100 MG: 10 INJECTION, SOLUTION INTRAVENOUS at 09:02

## 2025-02-11 RX ADMIN — PROPOFOL 100 MG: 10 INJECTION, EMULSION INTRAVENOUS at 09:02

## 2025-02-11 RX ADMIN — CEFEPIME 1 G: 1 INJECTION, POWDER, FOR SOLUTION INTRAMUSCULAR; INTRAVENOUS at 08:02

## 2025-02-11 RX ADMIN — PROPOFOL 50 MG: 10 INJECTION, EMULSION INTRAVENOUS at 09:02

## 2025-02-11 RX ADMIN — INSULIN GLARGINE 30 UNITS: 100 INJECTION, SOLUTION SUBCUTANEOUS at 08:02

## 2025-02-11 RX ADMIN — FAMOTIDINE 20 MG: 20 TABLET, FILM COATED ORAL at 08:02

## 2025-02-11 RX ADMIN — MIDAZOLAM HYDROCHLORIDE 4 MG: 1 INJECTION, SOLUTION INTRAMUSCULAR; INTRAVENOUS at 09:02

## 2025-02-11 RX ADMIN — METHYLPHENIDATE HYDROCHLORIDE 10 MG: 5 TABLET ORAL at 05:02

## 2025-02-11 RX ADMIN — CEFEPIME 1 G: 1 INJECTION, POWDER, FOR SOLUTION INTRAMUSCULAR; INTRAVENOUS at 12:02

## 2025-02-11 RX ADMIN — METHYLPHENIDATE HYDROCHLORIDE 10 MG: 5 TABLET ORAL at 08:02

## 2025-02-11 RX ADMIN — ENOXAPARIN SODIUM 40 MG: 40 INJECTION SUBCUTANEOUS at 08:02

## 2025-02-11 RX ADMIN — INSULIN ASPART 10 UNITS: 100 INJECTION, SOLUTION INTRAVENOUS; SUBCUTANEOUS at 08:02

## 2025-02-11 RX ADMIN — FOLIC ACID 1 MG: 1 TABLET ORAL at 08:02

## 2025-02-11 RX ADMIN — METHOCARBAMOL 500 MG: 500 TABLET ORAL at 09:02

## 2025-02-11 RX ADMIN — VANCOMYCIN HYDROCHLORIDE 1250 MG: 1.25 INJECTION, POWDER, LYOPHILIZED, FOR SOLUTION INTRAVENOUS at 02:02

## 2025-02-11 RX ADMIN — THIAMINE HCL TAB 100 MG 100 MG: 100 TAB at 08:02

## 2025-02-11 RX ADMIN — AMLODIPINE BESYLATE 10 MG: 5 TABLET ORAL at 08:02

## 2025-02-11 NOTE — PROCEDURES
Bedside Percutaneous Tracheostomy Procedure    Patient Name: Ezequiel Ramires  MRN: [id number]  YOB: 1960  Admit Date: 2/4/2025  #7  Date of Procedure: 02/11/2025    Procedure: Percutaneous Tracheostomy  Indications: Acute respiratory failure   Performed: Jose Armando Monroe MD  Assisted: Daisy Moreno Jr., MD, MS    Trach size: 8.5 Cuffed     Procedure Details:   After appropriate consents were obtained, a time out was done and sedation/analgesia was administered. The anterior neck was was prepped and draped in the usual standard sterile fashion. A bronchoscope was then inserted down the ETT and the tube was partially withdrawn to the subglottic area in order to visualize site of entry into the trachea. The second and third tracheal rings was palpated, and a 2 cm midline vertical incision was made at this location. Hemostats were used to bluntly dissect to the level of the trachea until the tracheal rings can be palpated. Under direct bronchoscopic visualization, an introducer needle and catheter was inserted between the second and third tracheal rings. Upon visualization of the introducer needle within the trachea, the needle was removed and the catheter remained in place. A guidewire was passed through the catheter into the trachea to the level of the cali and the catheter sheath was removed. The dilator was passed over the wire and was used to dilate trachea. The rhino was then inserted under direct visualization until the black indicator was visualized. The rhino was removed, and the Shiley tracheostomy tube with the loading dilator was passed over the wire and sheath. The loading dilator and guidewire were withdrawn while the tracheostomy tube was held in place. Trach cuff was inflated. The ETT was removed. The tracheostomy tube cuffed was sutured at 4 points with 2-0 prolene sutures. We then proceeded with a bronchoscopy. The bronchoscope was inserted down the tracheostomy tube and the cali was  visualized. We then removed the bronchoscope and the ventilator circuit was connected to the tracheostomy tube and ventilation was resumed. Patient tolerated the procedure well with no immediate complications.    Plan:  - Obtain a stat CXR  - Continue daily CXR/ABGs while on mechanical ventilation  - Routine tracheostomy care        Jose Armando Monroe MD  General Surgery PGY-1  [facility]

## 2025-02-11 NOTE — PT/OT/SLP PROGRESS
Occupational Therapy      Patient Name:  Ezequiel Ramires   MRN:  [id number]    Patient currently in procedure for trach and PEG placement. Will follow-up as schedule permits.    2/11/2025

## 2025-02-11 NOTE — PT/OT/SLP PROGRESS
Physical Therapy      Patient Name:  Ezequiel Ramires   MRN:  [id number]    Hold 2/2 increased sedation from trach and peg; will follow up tomorrow.

## 2025-02-11 NOTE — PROGRESS NOTES
[facility] *    Cardiology  Progress Note    Patient Name: Ezequiel Ramires  MRN: [id number]  Admission Date: 2/4/2025  Hospital Length of Stay: 7 days  Code Status: Full Code   Attending Provider: Maik Moreno Jr., *   Consulting Provider: HEBERT Wilkerson  Primary Care Physician: Davy Stacy MD  Principal Problem:<principal problem not specified>    Patient information was obtained from past medical records and ER records.     Subjective:     Chief Complaint/Reason for Consult: Bigeminy    HPI: Mr. Ramires is a 63 y/o male who is unknown to [facility]. The patient initially presented to [facility] on 2.4.25 via Transfer as a Trauma from a [facility] for a Noted Bilateral SDH with 0.8 R to L Midline Shift, SAH, L Occipital Fracture and a R 4th Rib Fracture. The patients last known normal was around 7:30 PM the night prior and when she woke up she found him in Vomit on the Floor. On 2.4.25 he underwent an Emergent Crani. He was stabilized and monitored in the Trauma ICU. He was being worked up and planned for a PEG/Tracheostomy, however, he developed Bigeminy on EKG and this was postponed. [facility] was consulted for Recommendations.     2.11.25: NAD. Obtunded. Vented/Sedated. Family at Bedside. ST with Bigeminy.     PMH: CAD, DM II, HLD, ETOH Abuse, Psoriasis, Hx of MI, Anxiety, OA  PSH: Craniotomy  Family History: Denies Family History of Heart Disease  Social History: Denies Tobacco Use/Former Smoker; + 1/2 Pint ETOH Daily; Denies Illicit Drug Use    Previous Cardiac Diagnostics:   ECHO 2.10.25:  Left Ventricle: The left ventricle is normal in size. Mildly increased wall thickness. There is normal systolic function. Ejection fraction is approximately 55%.  Rhythm is bigeminy  Right Ventricle: Normal right ventricular cavity size. Systolic function is normal.  Mitral Valve:  There is mild regurgitation.  Tricuspid Valve: There is mild regurgitation.    Review of patient's allergies indicates:  No  Known Allergies  No current facility-administered medications on file prior to encounter.     Current Outpatient Medications on File Prior to Encounter   Medication Sig    amLODIPine (NORVASC) 5 MG tablet Take 1 tablet by mouth once daily.    ezetimibe (ZETIA) 10 mg tablet Take 1 tablet by mouth once daily.    icosapent ethyL (VASCEPA) 1 gram Cap Take 2,000 mg by mouth every evening.    losartan (COZAAR) 100 MG tablet Take 1 tablet by mouth every evening.    metoprolol tartrate (LOPRESSOR) 50 MG tablet Take 1 tablet by mouth 2 (two) times daily.    prasugreL HCl (EFFIENT) 10 mg Tab Take 10 mg by mouth once daily.    rosuvastatin (CRESTOR) 10 MG tablet Take 1 tablet by mouth every evening.    aspirin (ECOTRIN) 81 MG EC tablet Take 1 tablet by mouth every morning.    semaglutide (OZEMPIC SUBQ) Inject into the skin.     Review of Systems   Unable to perform ROS: Intubated     Objective:     Vital Signs (Most Recent):  Temp: (!) 101.8 °F (38.8 °C) (02/11/25 1200)  Pulse: 101 (02/11/25 1100)  Resp: (!) 23 (02/11/25 0935)  BP: (!) 150/98 (02/11/25 1015)  SpO2: 95 % (02/11/25 1100) Vital Signs (24h Range):  Temp:  [98.8 °F (37.1 °C)-101.8 °F (38.8 °C)] 101.8 °F (38.8 °C)  Pulse:  [] 101  Resp:  [1-33] 23  SpO2:  [93 %-100 %] 95 %  BP: (102-152)/() 150/98  Arterial Line BP: (107-201)/(47-79) 130/59   Weight: 80 kg (176 lb 5.9 oz)  Body mass index is 23.91 kg/m².  SpO2: 95 %       Intake/Output Summary (Last 24 hours) at 2/11/2025 1425  Last data filed at 2/11/2025 0600  Gross per 24 hour   Intake 699.69 ml   Output 1150 ml   Net -450.31 ml     Lines/Drains/Airways       Drain  Duration                  NG/OG Tube 02/04/25 orogastric Center mouth 7 days    Male External Urinary Catheter 02/06/25 2300 Small 4 days         Gastrostomy/Enterostomy 02/11/25 1017 LUQ <1 day              Airway  Duration             Adult Surgical Airway 02/11/25 0958 Collins Cuffed 8.0 / 85 mm <1 day              Peripheral Intravenous  "Line  Duration                  Peripheral IV - Single Lumen 02/04/25 1322 18 G Left Antecubital 7 days         Peripheral IV - Single Lumen 02/04/25 1730 20 G;18 G Right Antecubital 6 days         Peripheral IV - Single Lumen 02/08/25 1600 18 G Anterior;Right Upper Arm 2 days                  Significant Labs:   Chemistries:   Recent Labs   Lab 02/07/25  0222 02/07/25  0743 02/08/25  0250 02/08/25  0701 02/09/25  0422 02/09/25  1245 02/10/25  0307 02/10/25  0650 02/10/25  1043 02/10/25  1851 02/11/25  0336   *   < > 156*   < > 149*   < > 144 144 144 141 143   K 3.7  --  3.3*  --  3.7  --  4.0  --   --   --  4.2   *  --  125*  --  115*  --  110*  --   --   --  109*   CO2 22*  --  23  --  25  --  27  --   --   --  25   BUN 18.5  --  19.4  --  19.3  --  24.7  --   --   --  29.5*   CREATININE 0.71*  --  0.71*  --  0.77  --  0.80  --   --   --  0.79   CALCIUM 7.9*  --  8.2*  --  8.0*  --  8.6*  --   --   --  7.9*   BILITOT 0.4  --  0.3  --  0.4  --  0.6  --   --   --  0.6   ALKPHOS 53  --  64  --  68  --  73  --   --   --  56   ALT 22  --  38  --  40  --  33  --   --   --  25   AST 24  --  38*  --  31  --  18  --   --   --  16   GLUCOSE 160*  --  163*  --  332*  --  305*  --   --   --  297*    < > = values in this interval not displayed.        CBC/Anemia Labs: Coags:    Recent Labs   Lab 02/09/25  0422 02/10/25  0307 02/11/25  0336   WBC 6.76 8.81 7.54   HGB 10.2* 11.5* 10.9*   HCT 32.2* 36.3* 33.9*    273 278   MCV 93.6 92.4 90.2   RDW 14.7 14.6 14.7    No results for input(s): "PT", "INR", "APTT" in the last 168 hours.       Telemetry: ST (Low 100s) with Bigeminy     Physical Exam  Constitutional:       General: He is not in acute distress.     Appearance: He is ill-appearing.      Comments: Vented/Sedated   HENT:      Head: Normocephalic.      Mouth/Throat:      Mouth: Mucous membranes are dry.   Cardiovascular:      Rate and Rhythm: Tachycardia present. Rhythm irregular.      Pulses: Normal " pulses.      Heart sounds: Murmur heard.   Pulmonary:      Effort: Pulmonary effort is normal. No respiratory distress.      Breath sounds: Examination of the right-lower field reveals decreased breath sounds. Examination of the left-lower field reveals decreased breath sounds. Decreased breath sounds present.      Comments: Anterior Neck Tracheostomy  Ventilator Associated Breath Sounds  Vent Mode: SIMV  Oxygen Concentration (%):  [30] 30  Resp Rate Total:  [18 br/min-30 br/min] 20 br/min  Vt Set:  [500 mL] 500 mL  PEEP/CPAP:  [5 cmH20] 5 cmH20  Pressure Support:  [10 cmH20] 10 cmH20  Mean Airway Pressure:  [9 ppC98-83 cmH20] 13 cmH20  Abdominal:      Palpations: Abdomen is soft.      Comments: + PEG   Skin:     General: Skin is warm.   Neurological:      Comments: Vented/Sedated       Home Medications:   No current facility-administered medications on file prior to encounter.     Current Outpatient Medications on File Prior to Encounter   Medication Sig Dispense Refill    amLODIPine (NORVASC) 5 MG tablet Take 1 tablet by mouth once daily.      ezetimibe (ZETIA) 10 mg tablet Take 1 tablet by mouth once daily.      icosapent ethyL (VASCEPA) 1 gram Cap Take 2,000 mg by mouth every evening.      losartan (COZAAR) 100 MG tablet Take 1 tablet by mouth every evening.      metoprolol tartrate (LOPRESSOR) 50 MG tablet Take 1 tablet by mouth 2 (two) times daily.      prasugreL HCl (EFFIENT) 10 mg Tab Take 10 mg by mouth once daily.      rosuvastatin (CRESTOR) 10 MG tablet Take 1 tablet by mouth every evening.      aspirin (ECOTRIN) 81 MG EC tablet Take 1 tablet by mouth every morning.      semaglutide (OZEMPIC SUBQ) Inject into the skin.       Current Schedule Inpatient Medications:   amantadine HCL  100 mg Per NG tube BID    amLODIPine  10 mg Per OG tube Daily    ceFEPime IV (PEDS and ADULTS)  1 g Intravenous Q6H    docusate  100 mg Per G Tube BID    enoxparin  40 mg Subcutaneous Q12H (prophylaxis, 0900/2100)    famotidine   20 mg Per OG tube BID    folic acid  1 mg Per OG tube Daily    insulin glargine U-100  30 Units Subcutaneous BID    losartan  100 mg Per OG tube QHS    methocarbamoL  500 mg Per OG tube Q8H    methylphenidate HCl  10 mg Per OG tube BID WM    metoprolol tartrate  50 mg Per OG tube BID    multivitamin  1 tablet Per OG tube Daily    polyethylene glycol  17 g Per OG tube BID    thiamine  100 mg Per OG tube Daily    vancomycin 1,250 mg in 0.9% NaCl 250 mL IVPB (admixture device)  1,250 mg Intravenous Q12H     Continuous Infusions:   D10W   Intravenous Continuous PRN         Assessment:   ST with Bigeminy    - ECHO (2.10.25) - LVEF 55%  Alleged Fall/Trauma (PT Found Down by Bed in [place]om[place]) resulting in (2.4.25) Acute Non-Displaced R 4th Rib Fracture, Tree-in-bud Nodularities in the Bilateral Lower Lobes which could be do to Infectious Bronchiolitis/Aspiration, 4mm Anterolisthesis of C4 on C5 due to Degeneration vs Trauma, Non-Displaced L Occipital Bone Fracture extending Inferiorly to the Level of the L Foramen Magnum, Bilateral SDH, Bilateral IPH, Bilateral SAH, Small Layering Hemorrhage in the L Occipital Horn and 8mm R to L Midline Shift    - s/p Emergent Crani (2.9.25)   Acute Hypoxemic Respiratory Failure requiring Intubation/Ventilation   CAD/Unspecified (No Reports for Review)   DM II  HLD  ETOH Abuse  Febrile  Psoriasis  Hx of MI  Anxiety  OA  No Hx of GIB     Plan:   Continue BB   Keep K > 4.0 and Mg > 2.0   Defer Lytes to Primary Team   ECHO Reviewed   Bigeminal Rhythm mediated by Neurological Etiology  F/U with Cardiology as OP for MCT for 2 Weeks  Results on F/U with Cardiologist in [place], [place]  We will be available as needed    Dandy Schwarz, ANP  Cardiology  [facility]

## 2025-02-11 NOTE — PROGRESS NOTES
Detail Level: Detailed Inpatient Nutrition Assessment    Admit Date: 2/4/2025   Total duration of encounter: 7 days   Patient Age: 64 y.o.    Nutrition Recommendation/Prescription     Tube feeding recommendation:     Impact Peptide 1.5 goal rate 70 ml/hr to provide  2100 kcal/d  (97% est needs)  131 g protein/d (100% est needs)  1078 ml free water/d (50% est needs)  240gm CHO/day  (calculations based on estimated 20 hr/d run time)     If no IV fluids running, can give 100ml q 2hr water flushes. Total water provided: 2078ml (119% est needs.)     Start @ 20ml/hr, increase as tolerated 20ml/hr q4hr until goal rate reached.      Communication of Recommendations: reviewed with nurse    Nutrition Assessment     Malnutrition Assessment/Nutrition-Focused Physical Exam       Malnutrition Level: other (see comments) (Does not meet criteria) (02/05/25 0931)  Energy Intake (Malnutrition): other (see comments) (Unable to assess) (02/05/25 0931)  Weight Loss (Malnutrition): other (see comments) (Unable to assess) (02/05/25 0931)                                         Fluid Accumulation (Malnutrition): other (see comments) (Not present) (02/05/25 0931)        A minimum of two characteristics is recommended for diagnosis of either severe or non-severe malnutrition.    Chart Review    Reason Seen: continuous nutrition monitoring and physician consult for Tf    Malnutrition Screening Tool Results   Have you recently lost weight without trying?: No  Have you been eating poorly because of a decreased appetite?: No   MST Score: 0   Diagnosis:  SDH, SAH, IPH  Left occipital bone fracture  Right 4th rib fracture    Relevant Medical History:   CAD, DM, HTN, HLD, alcoholism, anxiety, arthritis, MI     Scheduled Medications:  amantadine HCL, 100 mg, BID  amLODIPine, 10 mg, Daily  ceFEPime IV (PEDS and ADULTS), 1 g, Q6H  docusate, 100 mg, BID  enoxparin, 40 mg, Q12H (prophylaxis, 0900/2100)  famotidine, 20 mg, BID  folic acid, 1 mg, Daily  insulin glargine U-100,  20 Units, BID  losartan, 100 mg, QHS  methocarbamoL, 500 mg, Q8H  methylphenidate HCl, 10 mg, BID WM  metoprolol tartrate, 50 mg, BID  multivitamin, 1 tablet, Daily  polyethylene glycol, 17 g, BID  thiamine, 100 mg, Daily  vancomycin 1,250 mg in 0.9% NaCl 250 mL IVPB (admixture device), 1,250 mg, Q12H    Continuous Infusions:  D10W    PRN Medications:  acetaminophen, 650 mg, Q4H PRN  acetaminophen, 650 mg, Q4H PRN  bisacodyL, 10 mg, Daily PRN  ceFAZolin (Ancef) IV (PEDS and ADULTS), 2 g, On Call Procedure  D10W, , Continuous PRN  dextrose 50%, 12.5 g, PRN  dextrose 50%, 25 g, PRN  enalaprilat, 1.25 mg, Q4H PRN  glucagon (human recombinant), 1 mg, PRN  hydrALAZINE, 10 mg, Q4H PRN  insulin aspart U-100, 0-10 Units, Q4H PRN  labetaloL, 20 mg, Q4H PRN  LORazepam, 2 mg, Q4H PRN  melatonin, 6 mg, Nightly PRN  ondansetron, 4 mg, Q6H PRN  oxyCODONE, 5 mg, Q4H PRN  prochlorperazine, 5 mg, Q6H PRN  sodium chloride 0.9%, 10 mL, Q12H PRN  vancomycin - pharmacy to dose, , pharmacy to manage frequency    Calorie Containing IV Medications: no significant kcals from medications at this time    Recent Labs   Lab 02/05/25  0401 02/05/25  1157 02/06/25  0130 02/06/25  0352 02/06/25  0654 02/07/25  0222 02/07/25  0743 02/08/25  0250 02/08/25  0701 02/09/25  0422 02/09/25  1245 02/09/25  1805 02/09/25  2245 02/10/25  0307 02/10/25  0650 02/10/25  1043 02/10/25  1851 02/11/25  0336      < >  --  146*   < > 156*   < > 156*   < > 149*   < > 146* 144 144 144 144 141 143   K 3.3*  --   --  3.5  --  3.7  --  3.3*  --  3.7  --   --   --  4.0  --   --   --  4.2   CALCIUM 8.3*  --   --  8.2*  --  7.9*  --  8.2*  --  8.0*  --   --   --  8.6*  --   --   --  7.9*     --   --  115*  --  126*  --  125*  --  115*  --   --   --  110*  --   --   --  109*   CO2 23  --   --  22*  --  22*  --  23  --  25  --   --   --  27  --   --   --  25   BUN 18.4  --   --  12.9  --  18.5  --  19.4  --  19.3  --   --   --  24.7  --   --   --  29.5*  Add 32058 Cpt? (Important Note: In 2017 The Use Of 12521 Is Being Tracked By Cms To Determine Future Global Period Reimbursement For Global Periods): yes   CREATININE 0.86  --   --  0.74  --  0.71*  --  0.71*  --  0.77  --   --   --  0.80  --   --   --  0.79   EGFRNORACEVR >60  --   --  >60  --  >60  --  >60  --  >60  --   --   --  >60  --   --   --  >60   GLUCOSE 247*  --   --  221*  --  160*  --  163*  --  332*  --   --   --  305*  --   --   --  297*   BILITOT 0.7  --   --  0.5  --  0.4  --  0.3  --  0.4  --   --   --  0.6  --   --   --  0.6   ALKPHOS 58  --   --  58  --  53  --  64  --  68  --   --   --  73  --   --   --  56   ALT 37  --   --  22  --  22  --  38  --  40  --   --   --  33  --   --   --  25   AST 83*  --   --  27  --  24  --  38*  --  31  --   --   --  18  --   --   --  16   ALBUMIN 3.4  --   --  2.8*  --  2.5*  --  2.5*  --  2.5*  --   --   --  2.8*  --   --   --  2.5*   PREALB  --   --   --  11.5*  --   --   --   --   --   --   --   --   --  16.7  --   --   --   --    CRP  --   --   --  222.80*  --   --   --   --   --   --   --   --   --  40.80*  --   --   --   --    WBC 10.57  --  9.44  --   --  7.95  --  7.81  --  6.76  --   --   --  8.81  --   --   --  7.54   HGB 12.0*  --  10.7*  --   --  9.6*  --  10.1*  --  10.2*  --   --   --  11.5*  --   --   --  10.9*   HCT 36.0*  --  33.2*  --   --  30.5*  --  32.5*  --  32.2*  --   --   --  36.3*  --   --   --  33.9*    < > = values in this interval not displayed.     Nutrition Orders:  Diet NPO  Tube Feedings/Formulas 70; 1,400; Impact Peptide 1.5; OG; 100; Every 2 hours    Appetite/Oral Intake: not applicable/not applicable  Factors Affecting Nutritional Intake: on mechanical ventilation  Social Needs Impacting Access to Food: unable to assess at this time; will attempt on follow-up  Food/Religion/Cultural Preferences: unable to obtain  Food Allergies: no known food allergies  Last Bowel Movement: 02/09/25  Wound(s):  Incision documentation noted     Comments    2/5/25: Discussed with RN. Will provide tube feeding recommendations for when appropriate to start tube feeding. Receiving kcal from meds.   "Noted elevated Tmax, may need to update est needs upon F/U.     2/7/25: TF continues, tolerated per RN. Noted Na and Cl elevated. Purposefully elevated. Being managed by neurosurgery.    2/11/25: TF continues. Currently on hold for PEG and trach today. Noted lantus orders. Noted Ozempic listed in pt home meds.    Anthropometrics    Height: 6' 0.01" (182.9 cm), Height Method: Estimated  Last Weight: 80 kg (176 lb 5.9 oz) (02/05/25 0400), Weight Method: Bed Scale  BMI (Calculated): 23.9  BMI Classification: normal (BMI 18.5-24.9)        Ideal Body Weight (IBW), Male: 178.06 lb     % Ideal Body Weight, Male (lb): 99.08 %                          Usual Weight Provided By: unable to obtain usual weight    Wt Readings from Last 5 Encounters:   02/05/25 80 kg (176 lb 5.9 oz)     Weight Change(s) Since Admission:   Wt Readings from Last 1 Encounters:   02/05/25 0400 80 kg (176 lb 5.9 oz)   02/04/25 1234 80 kg (176 lb 5.9 oz)   Admit Weight: 80 kg (176 lb 5.9 oz) (02/04/25 1234), Weight Method: Estimated    Estimated Needs    Weight Used For Calorie Calculations: 80 kg (176 lb 5.9 oz)  Energy Calorie Requirements (kcal): 2158kcal  Energy Need Method: [facility]  Weight Used For Protein Calculations: 80 kg (176 lb 5.9 oz)  Protein Requirements: 120-136gm (1.5-1.7g/kg)  Fluid Requirements (mL): 2158ml (1ml/kcal)  CHO Requirement: 245gm (45% est kcal needs)     Enteral Nutrition     Formula: Impact Peptide 1.5 Gus  Rate/Volume: 70ml/hr  Water Flushes: 100ml q2hr  Additives/Modulars: none at this time  Route: orogastric tube  Method: continuous  Total Nutrition Provided by Tube Feeding, Additives, and Flushes:  Calories Provided  2100 kcal/d, 97% needs   Protein Provided  131 g/d, 100% needs   Fluid Provided  2078 ml/d, 96% needs   Continuous feeding calculations based on estimated 20 hr/d run time unless otherwise stated.    Parenteral Nutrition     Patient not receiving parenteral nutrition support at this time.    Evaluation " of Received Nutrient Intake    Calories: meeting estimated needs  Protein: meeting estimated needs    Patient Education     Not applicable.    Nutrition Diagnosis     PES: Inadequate oral intake related to acute illness as evidenced by intubation since admit. (active)     PES:            Nutrition Interventions     Intervention(s): collaboration with other providers  Intervention(s):      Goal: Meet greater than 80% of nutritional needs by follow-up. (goal progressing)  Goal: Tolerate enteral feeding at goal rate by follow-up. (goal progressing)    Nutrition Goals & Monitoring     Dietitian will monitor: energy intake and enteral nutrition intake  Discharge planning: too early to determine; pending clinical course  Nutrition Risk/Follow-Up: high (follow-up in 1-4 days)   Please consult if re-assessment needed sooner.

## 2025-02-11 NOTE — PROGRESS NOTES
POD#7 right craniectomy for SDH  He is currently intubated, mechanically ventilated  No sedation  Na 143  Getting Ritalin and Amantadine  S/p trach and PEG    Temp Max 101.4/24 hrs, VSS  Pupils 3mm bilateral, reactive brisk  He intermittently opens his eyes to sternal rub  He is intermittently following commands in bilateral LE  Dressing c/d/I  Flap full but not tense  Drain site dry    Plan:   OK to leave incision open to air  Continue Q4 hour neuro exams  Continue BP parameters below 150/90  Keppra BID  SCDs and lovenox for DVT prophylaxis

## 2025-02-11 NOTE — PROCEDURES
Bedside PEG Procedure    Patient Name: Ezequiel Ramires  MRN: [id number]  YOB: 1960  Admit Date: 2/4/2025  HD#7  Date of Procedure: 02/11/2025    Procedure: Percutaneous endoscopic gastrostomy  Indications: Dysphagia   Performed: Jose Armando Monroe MD  Assisted: Daisy Moreno Jr., MD, MS     Description of Technical Procedures:   After appropriate consents were obtained, a time out was done and sedation/analgesia was administered. The patient's abdomen was prepped and draped in sterile fashion. A scope was advanced past UES and GEJ without any difficulty, and the stomach was insufflated. Transillumination and finger compression of the stomach were used to identify location on the abdominal wall where PEG tube would be placed. A 2 cm transverse incision was made in the subxiphoid position. The needle and sheath was inserted into the stomach under direct visualization. The wire grasper was placed around needle and catheter. The needle was removed, and the sheath was left in place. The blue wire was advanced into the stomach through the catheter, and catheter was pulled back so that the wire grasper was was grasping the blue wire. The blue wire was pulled up through the oropharynx. The blue wire was attached to the PEG tube and pulled through the abdominal incision until the PEG tire was fixed against the gastric wall and the tube spun freely in good position, at about 3.5 cm. The scope was advanced back into the stomach and the PEG tire was noted to be intragastric.The scope was removed from the patient and the stomach was desufflated. The external bumper was advanced on the external feeding tube portion such that the internal bumper was snug and rotated freely, the catheter was cut to appropriate length, and the cap was placed on the end of the catheter. The patient tolerated the procedure well with no issues.     Plan:  - Start trickle tube feeds via PEG. Advance to goal.  - Routine PEG care         Jose Armando  MD Fer  General Surgery PGY-1  [facility]

## 2025-02-11 NOTE — PROGRESS NOTES
Trauma ICU Progress Note    Patient Information:   Patient Name: Ezequiel Ramires                   : 1960     MRN: [id number]   Date of Admission: 2025  Code Status: Full Code  Date of Exam: 2025  HD#7  POD#7 Days Post-Op  Attending Provider: Maik Moreno Jr., *  Admission Summary:   Patient is a 64 year old  male with PMH of CAD, HTN, DM, HLD, alcoholism, arthritis, anxiety, MI who presents as a transfer from [facility] with bilateral SDH with 0.8 right to left midline shift, SAH, left occipital fracture, right 4th rib fracture. Report per wife patient was last seen normal around 1930 yesterday, she woke this morning to find him on the ground next to the bed with vomit on the floor, he was taken to the OSH at around 0500     Interval history:    AF HDS. NAEO. On minimal vent settings this morning at time of rounds, now s/p tracheostomy and PEG tube placement.         Consults:   Consults: Neurosurgery, Cardiology Injuries:  1. Acute nondisplaced R 4th rib fracture  2. Tree-in-bud nodularities in the bilateral lower lobes which could be due to infectious bronchiolitis or aspiration   3. 4 mm anterolisthesis of C4 on C5 due to degeneration versus trauma  4. Nondisplaced left occipital bone fracture extending inferiorly to the level of the foramen magnum   5. Bilateral SDH  6. Bilateral IPH  7. Bilateral SAH  8. Small layering hemorrhage in the left occipital horn   9. 8 mm right to left midline shift    [x]Problems list reviewed  [x]Tertiary exam performed Operations/Procedures:  Crani 25  Percutaneous tracheostomy   PEG      Past medical history:  HTN  HLD  DMII  Psoriasis  CAD s/p stents  MI    Medications: [x] Medications reviewed/updated   Home Meds:    Current Outpatient Medications   Medication Instructions    amLODIPine (NORVASC) 5 MG tablet 1 tablet, Daily    aspirin (ECOTRIN) 81 MG EC tablet 1 tablet, Oral, Every morning    ezetimibe (ZETIA) 10 mg tablet 1 tablet, Daily    icosapent  ethyL (VASCEPA) 2,000 mg, Nightly    losartan (COZAAR) 100 MG tablet 1 tablet, Nightly    metoprolol tartrate (LOPRESSOR) 50 MG tablet 1 tablet, 2 times daily    prasugreL HCl (EFFIENT) 10 mg, Daily    rosuvastatin (CRESTOR) 10 MG tablet 1 tablet, Nightly    semaglutide (OZEMPIC SUBQ) Subcutaneous      Scheduled Meds:    amantadine HCL  100 mg Per NG tube BID    amLODIPine  10 mg Per OG tube Daily    ceFEPime IV (PEDS and ADULTS)  1 g Intravenous Q6H    docusate  100 mg Per G Tube BID    enoxparin  40 mg Subcutaneous Q12H (prophylaxis, 0900/2100)    famotidine  20 mg Per OG tube BID    folic acid  1 mg Per OG tube Daily    insulin glargine U-100  20 Units Subcutaneous BID    losartan  100 mg Per OG tube QHS    methocarbamoL  500 mg Per OG tube Q8H    methylphenidate HCl  10 mg Per OG tube BID WM    metoprolol tartrate  50 mg Per OG tube BID    multivitamin  1 tablet Per OG tube Daily    polyethylene glycol  17 g Per OG tube BID    thiamine  100 mg Per OG tube Daily    vancomycin 1,250 mg in 0.9% NaCl 250 mL IVPB (admixture device)  1,250 mg Intravenous Q12H     Continuous Infusions:    D10W   Intravenous Continuous PRN         PRN Meds:   Current Facility-Administered Medications:     acetaminophen, 650 mg, Rectal, Q4H PRN    acetaminophen, 650 mg, Oral, Q4H PRN    bisacodyL, 10 mg, Rectal, Daily PRN    ceFAZolin (Ancef) IV (PEDS and ADULTS), 2 g, Intravenous, On Call Procedure    D10W, , Intravenous, Continuous PRN    dextrose 50%, 12.5 g, Intravenous, PRN    dextrose 50%, 25 g, Intravenous, PRN    enalaprilat, 1.25 mg, Intravenous, Q4H PRN    glucagon (human recombinant), 1 mg, Intramuscular, PRN    hydrALAZINE, 10 mg, Intravenous, Q4H PRN    insulin aspart U-100, 0-10 Units, Subcutaneous, Q4H PRN    labetaloL, 20 mg, Intravenous, Q4H PRN    LORazepam, 2 mg, Oral, Q4H PRN    melatonin, 6 mg, Oral, Nightly PRN    ondansetron, 4 mg, Intravenous, Q6H PRN    oxyCODONE, 5 mg, Oral, Q4H PRN    prochlorperazine, 5 mg,  "Intravenous, Q6H PRN    Flushing PICC/Midline Protocol, , , Until Discontinued **AND** sodium chloride 0.9%, 10 mL, Intravenous, Q12H PRN    [COMPLETED] Pharmacy to dose Vancomycin consult, , , Once **AND** vancomycin - pharmacy to dose, , Intravenous, pharmacy to manage frequency     Vitals:  VITAL SIGNS: 24 HR MIN & MAX LAST   Temp  Min: 98.8 °F (37.1 °C)  Max: 101.4 °F (38.6 °C)  98.8 °F (37.1 °C)   BP  Min: 97/61  Max: 150/98  (!) 150/98    Pulse  Min: 87  Max: 132  105    Resp  Min: 1  Max: 33  (!) 23    SpO2  Min: 93 %  Max: 100 %  100 %      HT: 6' 0.01" (182.9 cm)  WT: 80 kg (176 lb 5.9 oz)  BMI: 23.9   Ideal Body Weight (IBW), Male: 178.06 lb  % Ideal Body Weight, Male (lb): 99.08 %        General  Exam: intubated      Neuro/Psych  GCS: 11T (E 4) (V T) (M 6)  Exam: Patient is following commands moving his toes for me and opening his eyes to command. Crani incision c/d/I Corbin drain +SS drainage  ICP monitor: No  ICP treatment: No  C-Collar: No    Plan:   Bilateral SDH s/p crani-  Keppra x 7 days, HOB > 30 degrees. BP<140.   Continue amantadine, ritalin   Stop Keppra   CTM     HEENT  Exam: NCAT. Trach collar in place.     Plan:   monitoring     Pulmonary  Vitals: Resp  Av.3  Min: 1  Max: 33  SpO2  Av.6 %  Min: 93 %  Max: 100 %    Ventilator/Oxygen Settings:   Vent Mode: SIMV  Vt Set: 500 mL  Set Rate: 18 BPM  Pressure Support: 10 cmH20  I:E Ratio Measured: 1:3.2  Total PEEP: 5 cmH20 Vent Mode: SIMV (25)  Ventilator Initiated: Yes (25 1240)  Set Rate: 18 BPM (25)  Vt Set: 500 mL (25)  Pressure Support: 10 cmH20 (25)  PEEP/CPAP: 5 cmH20 (25)  Oxygen Concentration (%): 30 (25)  Peak Airway Pressure: 24 cmH20 (25)  Total Ve: 7.6 L/m (25)  F/VT Ratio<105 (RSBI): (!) 72.82 (02/10/25 1610)        ABG:   Recent Labs   Lab 25  06   PH 7.490*   PO2 70.0*   PCO2 35.0   HCO3 26.7*        CXR:    X-Ray Chest 1 " "View for Line/Tube Placement    Result Date: 2025  PICC line insertion. Confluent opacities in the left retrocardiac region with silhouetting of the left hemidiaphragm which might be related to an infiltrate/atelectasis. No other change Electronically signed by: Serg Mera Date:    2025 Time:    11:33        Rib fractures: right rib fracture  Chest Tube: None     Exam: Coarse BS bilaterally on minimal vent settings     Plan:     S/p tracheostomy   On minimal vent settings   CTM  Incentive Spirometry/RT Treatments: none     Cardiovascular  Vitals: Pulse  Av.7  Min: 87  Max: 132  BP  Min: 97/61  Max: 150/98  No results for input(s): "TROPONINI", "CKTOTAL", "CKMB", "BNP" in the last 168 hours.  Vasoactive Agents: None  Exam: RRR    Plan:   HTN- continue home meds and prns     Renal  Recent Labs     25  0422 02/10/25  0307 25  0336   BUN 19.3 24.7 29.5*   CREATININE 0.77 0.80 0.79       No results for input(s): "LACTIC" in the last 72 hours.    Intake/Output - Last 3 Shifts          0700  02/10 0659 02/10 07 0659  07 0659    I.V. (mL/kg)       NG/GT 2580      IV Piggyback 197.4 699.7     Total Intake(mL/kg) 2777.4 (34.7) 699.7 (8.7)     Urine (mL/kg/hr) 5450 (2.8) 1800 (0.9)     Drains 17.5      Stool 0 0     Total Output 5467.5 1800     Net -2690.1 -1100.3            Urine Occurrence 1 x      Stool Occurrence 3 x 1 x              Intake/Output Summary (Last 24 hours) at 2025 1044  Last data filed at 2025 0600  Gross per 24 hour   Intake 699.69 ml   Output 1150 ml   Net -450.31 ml         Garcia: no    Plan:   monitor     FEN/GI  Recent Labs     25  0422 25  1245 02/10/25  0307 02/10/25  0650 02/10/25  1043 02/10/25  1851 25  0336   *   < > 144 144 144 141 143   K 3.7  --  4.0  --   --   --  4.2   *  --  110*  --   --   --  109*   CO2 25  --  27  --   --   --  25   CALCIUM 8.0*  --  8.6*  --   --   --  7.9* "   ALBUMIN 2.5*  --  2.8*  --   --   --  2.5*   BILITOT 0.4  --  0.6  --   --   --  0.6   AST 31  --  18  --   --   --  16   ALKPHOS 68  --  73  --   --   --  56   ALT 40  --  33  --   --   --  25    < > = values in this interval not displayed.       Diet: NPO. Resume tube feeds s/p PEG.     Last BM: +BM    Abdominal Exam: s/NT/ND +BS. PEG tube in place.     Plan:   S/p PEG   Tube feeds held prior to PEG, resuming s/p PEG   Bowel regimen     Heme/Onc  Recent Labs     25  0422 02/10/25  0307 25  0336   HGB 10.2* 11.5* 10.9*   HCT 32.2* 36.3* 33.9*    273 278       Transfusions (over past 24h): None    Plan:   monitor     ID  Temp  Av.4 °F (38 °C)  Min: 98.8 °F (37.1 °C)  Max: 101.4 °F (38.6 °C)      Recent Labs     25  0422 02/10/25  0307 25  0336   WBC 6.76 8.81 7.54       Cultures: Antibiotics:    BCX x2 () in process  RCX () in process 1. Cefepime  2. Vancomycin      Plan:   CTM temp, WBC  Follow up cultures      Endocrine  Recent Labs     25  0422 02/10/25  0307 25  0336   GLUCOSE 332* 305* 297*      Recent Labs     25  1650 25  2039 02/10/25  0031 02/10/25  0701 02/10/25  1113 02/10/25  1541 02/10/25  2042 25  0814   POCTGLUCOSE 312* 325* 295* 272* 304* 342* 307* 315*        Plan:   DMII-continue moderate sliding scale. Increase Lantus from 20 to 30 BID.  Insulin treatment: moderate SS. 30 Lantus BID     Musculoskeletal  Weight bearing status:   RUE: WBAT  LUE: WBAT  RLE: WBAT  LLE: WBAT    Exam: does not have purposeful movement  Plan:   monitor     Wounds  Wounds exam: crani incision c/d/I RORO drain in place  Wound vac: No   Media: none  Plan: local wound care     Precautions  Precautions: Seizure     Prophylaxis  Seizure: Keppra (day 5/7)  DVT: Lovenox  GI: H2B     Lines/drains/airway   Lines/Drains/Airways       Drain  Duration                  NG/OG Tube 25 orogastric Center mouth 7 days    Male External Urinary Catheter  02/06/25 2300 Small 4 days         Gastrostomy/Enterostomy 02/11/25 1017 LUQ <1 day              Airway  Duration             Adult Surgical Airway 02/11/25 0958 Shiley Cuffed 8.0 / 85 mm <1 day              Peripheral Intravenous Line  Duration                  Peripheral IV - Single Lumen 02/04/25 1322 18 G Left Antecubital 6 days         Peripheral IV - Single Lumen 02/04/25 1730 20 G;18 G Right Antecubital 6 days         Peripheral IV - Single Lumen 02/08/25 1600 18 G Anterior;Right Upper Arm 2 days                    Plan:  Cont all lines    [x]LDA reviewed/updated      Restraints  Face to face evaluation of need for restraints on rounds today:   Currently restrained? No.        Assessment & Disposition:   Problem list:  Active Problem List with Overview Notes    Diagnosis Date Noted    Intraparenchymal hemorrhage of brain 02/05/2025    IVH (intraventricular hemorrhage) 02/05/2025    SDH (subdural hematoma) 02/04/2025    SAH (subarachnoid hemorrhage) 02/04/2025    Closed fracture of one rib of right side 02/04/2025    Closed fracture of left side of occipital bone 02/04/2025       Guarded. Poor prognosis.  Bilateral SDH s/p crani- Keppra x 7 days (stop 2/11), HOB > 30 degrees. BP<140. Continue amantadine, ritalin.  S/p tracheostomy 2/11, on minimal vent settings  HTN-  continue home meds and PRNs  S/p PEG 2/11. Resume tube feeds following procedure. .  Lovenox  PT/OT - high intensity therapy  DMII-Moderate SS and increase lantus from 20 to 30 BID         Jose Armando Monroe MD  General Surgery PGY-1  [facility]    Critical Care Time:   41 minutes of critical care was spent on this patient personally by me on the following activities: development of treatment plan with patient and bedside nurse, discussions with consultants, evaluation of patient's response to treatment, examining the patient, ordering and preforming treatments and interventions, ordering and reviewing laboratory studies, ordering and  reviewing radiologic studies, and re-evaluation of patient's condition.     Daisy Moreno Jr., MD, MS  Trauma Critical Care Surgery  [facility]   02/11/2025

## 2025-02-11 NOTE — PLAN OF CARE
Pt got trach and peg  Referral sent to [facility] admit intake as [facility] is off this week.     [facility] intake confirms having received referral and is processing/reviewing      1510 Rani from [facility] called and was updated. She has the floors number and will call tomorrow for updates. Her number is  [phone]

## 2025-02-12 PROBLEM — R13.10 DYSPHAGIA: Status: ACTIVE | Noted: 2025-02-12

## 2025-02-12 PROBLEM — J95.851 VAP (VENTILATOR-ASSOCIATED PNEUMONIA): Status: ACTIVE | Noted: 2025-02-12

## 2025-02-12 PROBLEM — J96.01 ACUTE HYPOXEMIC RESPIRATORY FAILURE: Status: ACTIVE | Noted: 2025-02-12

## 2025-02-12 LAB
ALBUMIN SERPL-MCNC: 2.5 G/DL (ref 3.4–4.8)
ALBUMIN/GLOB SERPL: 0.7 RATIO (ref 1.1–2)
ALLENS TEST BLOOD GAS (OHS): ABNORMAL
ALP SERPL-CCNC: 69 UNIT/L (ref 40–150)
ALT SERPL-CCNC: 23 UNIT/L (ref 0–55)
ANION GAP SERPL CALC-SCNC: 10 MEQ/L
AST SERPL-CCNC: 20 UNIT/L (ref 5–34)
BACTERIA SPT CULT: ABNORMAL
BASE EXCESS BLD CALC-SCNC: 4.3 MMOL/L
BASOPHILS # BLD AUTO: 0.04 X10(3)/MCL
BASOPHILS NFR BLD AUTO: 0.5 %
BILIRUB SERPL-MCNC: 0.5 MG/DL
BLOOD GAS SAMPLE TYPE (OHS): ABNORMAL
BUN SERPL-MCNC: 32.4 MG/DL (ref 8.4–25.7)
CA-I BLD-SCNC: 1.05 MMOL/L (ref 1.12–1.23)
CALCIUM SERPL-MCNC: 8.4 MG/DL (ref 8.8–10)
CHLORIDE SERPL-SCNC: 107 MMOL/L (ref 98–107)
CO2 BLDA-SCNC: 27.9 MMOL/L
CO2 SERPL-SCNC: 26 MMOL/L (ref 23–31)
CREAT SERPL-MCNC: 0.74 MG/DL (ref 0.72–1.25)
CREAT/UREA NIT SERPL: 44
DRAWN BY BLOOD GAS (OHS): ABNORMAL
EOSINOPHIL # BLD AUTO: 0.14 X10(3)/MCL (ref 0–0.9)
EOSINOPHIL NFR BLD AUTO: 1.8 %
ERYTHROCYTE [DISTWIDTH] IN BLOOD BY AUTOMATED COUNT: 14.5 % (ref 11.5–17)
GFR SERPLBLD CREATININE-BSD FMLA CKD-EPI: >60 ML/MIN/1.73/M2
GLOBULIN SER-MCNC: 3.7 GM/DL (ref 2.4–3.5)
GLUCOSE SERPL-MCNC: 319 MG/DL (ref 82–115)
GRAM STN SPEC: ABNORMAL
HCO3 BLDA-SCNC: 26.9 MMOL/L (ref 22–26)
HCT VFR BLD AUTO: 33.1 % (ref 42–52)
HGB BLD-MCNC: 10.6 G/DL (ref 14–18)
IMM GRANULOCYTES # BLD AUTO: 0.04 X10(3)/MCL (ref 0–0.04)
IMM GRANULOCYTES NFR BLD AUTO: 0.5 %
INHALED O2 CONCENTRATION: 30 %
LYMPHOCYTES # BLD AUTO: 1.41 X10(3)/MCL (ref 0.6–4.6)
LYMPHOCYTES NFR BLD AUTO: 18.5 %
MCH RBC QN AUTO: 28.8 PG (ref 27–31)
MCHC RBC AUTO-ENTMCNC: 32 G/DL (ref 33–36)
MCV RBC AUTO: 89.9 FL (ref 80–94)
MECH RR (OHS): 18 B/MIN
MODE (OHS): ABNORMAL
MONOCYTES # BLD AUTO: 1.04 X10(3)/MCL (ref 0.1–1.3)
MONOCYTES NFR BLD AUTO: 13.6 %
NEUTROPHILS # BLD AUTO: 4.96 X10(3)/MCL (ref 2.1–9.2)
NEUTROPHILS NFR BLD AUTO: 65.1 %
NRBC BLD AUTO-RTO: 0 %
OXYGEN DEVICE BLOOD GAS (OHS): ABNORMAL
PCO2 BLDA: 33 MMHG (ref 35–45)
PH BLDA: 7.52 [PH] (ref 7.35–7.45)
PLATELET # BLD AUTO: 316 X10(3)/MCL (ref 130–400)
PMV BLD AUTO: 11.6 FL (ref 7.4–10.4)
PO2 BLDA: 102 MMHG (ref 80–100)
POCT GLUCOSE: 283 MG/DL (ref 70–110)
POCT GLUCOSE: 290 MG/DL (ref 70–110)
POCT GLUCOSE: 305 MG/DL (ref 70–110)
POCT GLUCOSE: 312 MG/DL (ref 70–110)
POCT GLUCOSE: 315 MG/DL (ref 70–110)
POCT GLUCOSE: 317 MG/DL (ref 70–110)
POCT GLUCOSE: 366 MG/DL (ref 70–110)
POTASSIUM BLOOD GAS (OHS): 3.7 MMOL/L (ref 3.5–5)
POTASSIUM SERPL-SCNC: 3.7 MMOL/L (ref 3.5–5.1)
PROT SERPL-MCNC: 6.2 GM/DL (ref 5.8–7.6)
PS (OHS): 10 CMH2O
RBC # BLD AUTO: 3.68 X10(6)/MCL (ref 4.7–6.1)
SAMPLE SITE BLOOD GAS (OHS): ABNORMAL
SAO2 % BLDA: 98 %
SODIUM BLOOD GAS (OHS): 138 MMOL/L (ref 137–145)
SODIUM SERPL-SCNC: 143 MMOL/L (ref 136–145)
SPONT RR (OHS): 5 B/MIN
SPONT+MECH VT ON VENT: 500 ML
WBC # BLD AUTO: 7.63 X10(3)/MCL (ref 4.5–11.5)

## 2025-02-12 PROCEDURE — 82803 BLOOD GASES ANY COMBINATION: CPT

## 2025-02-12 PROCEDURE — 37799 UNLISTED PX VASCULAR SURGERY: CPT

## 2025-02-12 PROCEDURE — 85025 COMPLETE CBC W/AUTO DIFF WBC: CPT

## 2025-02-12 PROCEDURE — 20800000 HC ICU TRAUMA

## 2025-02-12 PROCEDURE — 99900035 HC TECH TIME PER 15 MIN (STAT)

## 2025-02-12 PROCEDURE — 80053 COMPREHEN METABOLIC PANEL: CPT

## 2025-02-12 PROCEDURE — 63600175 PHARM REV CODE 636 W HCPCS

## 2025-02-12 PROCEDURE — 99900026 HC AIRWAY MAINTENANCE (STAT)

## 2025-02-12 PROCEDURE — 94761 N-INVAS EAR/PLS OXIMETRY MLT: CPT | Mod: XB

## 2025-02-12 PROCEDURE — 94003 VENT MGMT INPAT SUBQ DAY: CPT

## 2025-02-12 PROCEDURE — 97535 SELF CARE MNGMENT TRAINING: CPT

## 2025-02-12 PROCEDURE — 63600175 PHARM REV CODE 636 W HCPCS: Performed by: SURGERY

## 2025-02-12 PROCEDURE — 99291 CRITICAL CARE FIRST HOUR: CPT | Mod: ,,, | Performed by: SURGERY

## 2025-02-12 PROCEDURE — 27200966 HC CLOSED SUCTION SYSTEM

## 2025-02-12 PROCEDURE — 97112 NEUROMUSCULAR REEDUCATION: CPT

## 2025-02-12 PROCEDURE — 25000003 PHARM REV CODE 250: Performed by: SURGERY

## 2025-02-12 PROCEDURE — 27100171 HC OXYGEN HIGH FLOW UP TO 24 HOURS

## 2025-02-12 PROCEDURE — 97530 THERAPEUTIC ACTIVITIES: CPT

## 2025-02-12 PROCEDURE — 99900031 HC PATIENT EDUCATION (STAT)

## 2025-02-12 PROCEDURE — 94760 N-INVAS EAR/PLS OXIMETRY 1: CPT | Mod: XB

## 2025-02-12 PROCEDURE — 25000003 PHARM REV CODE 250

## 2025-02-12 RX ORDER — INSULIN GLARGINE 100 [IU]/ML
40 INJECTION, SOLUTION SUBCUTANEOUS 2 TIMES DAILY
Status: DISCONTINUED | OUTPATIENT
Start: 2025-02-12 | End: 2025-02-20

## 2025-02-12 RX ORDER — FENTANYL CITRATE 50 UG/ML
INJECTION, SOLUTION INTRAMUSCULAR; INTRAVENOUS
Status: COMPLETED
Start: 2025-02-12 | End: 2025-02-12

## 2025-02-12 RX ORDER — METHOCARBAMOL 500 MG/1
500 TABLET, FILM COATED ORAL EVERY 8 HOURS
Status: DISCONTINUED | OUTPATIENT
Start: 2025-02-12 | End: 2025-02-21 | Stop reason: HOSPADM

## 2025-02-12 RX ORDER — FOLIC ACID 1 MG/1
1 TABLET ORAL DAILY
Status: DISCONTINUED | OUTPATIENT
Start: 2025-02-12 | End: 2025-02-21 | Stop reason: HOSPADM

## 2025-02-12 RX ORDER — LORAZEPAM 1 MG/1
2 TABLET ORAL EVERY 4 HOURS PRN
Status: DISCONTINUED | OUTPATIENT
Start: 2025-02-12 | End: 2025-02-21 | Stop reason: HOSPADM

## 2025-02-12 RX ORDER — FENTANYL CITRATE 50 UG/ML
50 INJECTION, SOLUTION INTRAMUSCULAR; INTRAVENOUS
Status: DISCONTINUED | OUTPATIENT
Start: 2025-02-12 | End: 2025-02-20

## 2025-02-12 RX ORDER — FENTANYL CITRATE 50 UG/ML
100 INJECTION, SOLUTION INTRAMUSCULAR; INTRAVENOUS ONCE
Status: COMPLETED | OUTPATIENT
Start: 2025-02-12 | End: 2025-02-12

## 2025-02-12 RX ORDER — INSULIN ASPART 100 [IU]/ML
0-10 INJECTION, SOLUTION INTRAVENOUS; SUBCUTANEOUS EVERY 6 HOURS PRN
Status: DISCONTINUED | OUTPATIENT
Start: 2025-02-12 | End: 2025-02-13

## 2025-02-12 RX ORDER — METHYLPHENIDATE HYDROCHLORIDE 10 MG/1
20 TABLET ORAL 2 TIMES DAILY WITH MEALS
Status: DISCONTINUED | OUTPATIENT
Start: 2025-02-12 | End: 2025-02-21 | Stop reason: HOSPADM

## 2025-02-12 RX ORDER — THIAMINE HCL 100 MG
100 TABLET ORAL DAILY
Status: DISCONTINUED | OUTPATIENT
Start: 2025-02-12 | End: 2025-02-21 | Stop reason: HOSPADM

## 2025-02-12 RX ORDER — METHYLPHENIDATE HYDROCHLORIDE 10 MG/1
10 TABLET ORAL 2 TIMES DAILY WITH MEALS
Status: DISCONTINUED | OUTPATIENT
Start: 2025-02-12 | End: 2025-02-12

## 2025-02-12 RX ORDER — GLUCAGON 1 MG
1 KIT INJECTION
Status: DISCONTINUED | OUTPATIENT
Start: 2025-02-12 | End: 2025-02-13

## 2025-02-12 RX ORDER — AMANTADINE HYDROCHLORIDE 50 MG/5ML
100 SOLUTION ORAL 2 TIMES DAILY
Status: DISCONTINUED | OUTPATIENT
Start: 2025-02-12 | End: 2025-02-20

## 2025-02-12 RX ORDER — ACETAMINOPHEN 650 MG/20.3ML
650 LIQUID ORAL EVERY 4 HOURS PRN
Status: DISCONTINUED | OUTPATIENT
Start: 2025-02-12 | End: 2025-02-21 | Stop reason: HOSPADM

## 2025-02-12 RX ORDER — METOPROLOL TARTRATE 50 MG/1
50 TABLET ORAL 2 TIMES DAILY
Status: DISCONTINUED | OUTPATIENT
Start: 2025-02-12 | End: 2025-02-21 | Stop reason: HOSPADM

## 2025-02-12 RX ORDER — OXYCODONE HYDROCHLORIDE 5 MG/1
5 TABLET ORAL EVERY 4 HOURS PRN
Status: DISCONTINUED | OUTPATIENT
Start: 2025-02-12 | End: 2025-02-21 | Stop reason: HOSPADM

## 2025-02-12 RX ORDER — LOSARTAN POTASSIUM 50 MG/1
100 TABLET ORAL NIGHTLY
Status: DISCONTINUED | OUTPATIENT
Start: 2025-02-12 | End: 2025-02-21 | Stop reason: HOSPADM

## 2025-02-12 RX ORDER — FAMOTIDINE 20 MG/1
20 TABLET, FILM COATED ORAL 2 TIMES DAILY
Status: DISCONTINUED | OUTPATIENT
Start: 2025-02-12 | End: 2025-02-21 | Stop reason: HOSPADM

## 2025-02-12 RX ORDER — AMLODIPINE BESYLATE 5 MG/1
10 TABLET ORAL DAILY
Status: DISCONTINUED | OUTPATIENT
Start: 2025-02-12 | End: 2025-02-21 | Stop reason: HOSPADM

## 2025-02-12 RX ADMIN — ENOXAPARIN SODIUM 40 MG: 40 INJECTION SUBCUTANEOUS at 08:02

## 2025-02-12 RX ADMIN — VANCOMYCIN HYDROCHLORIDE 1250 MG: 1.25 INJECTION, POWDER, LYOPHILIZED, FOR SOLUTION INTRAVENOUS at 02:02

## 2025-02-12 RX ADMIN — AMLODIPINE BESYLATE 10 MG: 5 TABLET ORAL at 08:02

## 2025-02-12 RX ADMIN — ACETAMINOPHEN 650 MG: 650 SOLUTION ORAL at 06:02

## 2025-02-12 RX ADMIN — METHOCARBAMOL 500 MG: 500 TABLET ORAL at 01:02

## 2025-02-12 RX ADMIN — CEFEPIME 1 G: 1 INJECTION, POWDER, FOR SOLUTION INTRAMUSCULAR; INTRAVENOUS at 02:02

## 2025-02-12 RX ADMIN — METHOCARBAMOL 500 MG: 500 TABLET ORAL at 05:02

## 2025-02-12 RX ADMIN — AMANTADINE HYDROCHLORIDE 100 MG: 50 SOLUTION ORAL at 08:02

## 2025-02-12 RX ADMIN — INSULIN ASPART 6 UNITS: 100 INJECTION, SOLUTION INTRAVENOUS; SUBCUTANEOUS at 12:02

## 2025-02-12 RX ADMIN — FAMOTIDINE 20 MG: 20 TABLET, FILM COATED ORAL at 08:02

## 2025-02-12 RX ADMIN — INSULIN ASPART 8 UNITS: 100 INJECTION, SOLUTION INTRAVENOUS; SUBCUTANEOUS at 12:02

## 2025-02-12 RX ADMIN — INSULIN GLARGINE 40 UNITS: 100 INJECTION, SOLUTION SUBCUTANEOUS at 08:02

## 2025-02-12 RX ADMIN — THERA TABS 1 TABLET: TAB at 08:02

## 2025-02-12 RX ADMIN — FENTANYL CITRATE 100 MCG: 50 INJECTION, SOLUTION INTRAMUSCULAR; INTRAVENOUS at 04:02

## 2025-02-12 RX ADMIN — ACETAMINOPHEN 650 MG: 650 SOLUTION ORAL at 08:02

## 2025-02-12 RX ADMIN — INSULIN ASPART 8 UNITS: 100 INJECTION, SOLUTION INTRAVENOUS; SUBCUTANEOUS at 08:02

## 2025-02-12 RX ADMIN — CEFEPIME 1 G: 1 INJECTION, POWDER, FOR SOLUTION INTRAMUSCULAR; INTRAVENOUS at 07:02

## 2025-02-12 RX ADMIN — ACETAMINOPHEN 650 MG: 650 SOLUTION ORAL at 12:02

## 2025-02-12 RX ADMIN — INSULIN ASPART 10 UNITS: 100 INJECTION, SOLUTION INTRAVENOUS; SUBCUTANEOUS at 11:02

## 2025-02-12 RX ADMIN — INSULIN ASPART 6 UNITS: 100 INJECTION, SOLUTION INTRAVENOUS; SUBCUTANEOUS at 04:02

## 2025-02-12 RX ADMIN — THIAMINE HCL TAB 100 MG 100 MG: 100 TAB at 08:02

## 2025-02-12 RX ADMIN — LOSARTAN POTASSIUM 100 MG: 50 TABLET, FILM COATED ORAL at 08:02

## 2025-02-12 RX ADMIN — ACETAMINOPHEN 650 MG: 650 SOLUTION ORAL at 04:02

## 2025-02-12 RX ADMIN — FOLIC ACID 1 MG: 1 TABLET ORAL at 08:02

## 2025-02-12 RX ADMIN — CEFEPIME 1 G: 1 INJECTION, POWDER, FOR SOLUTION INTRAMUSCULAR; INTRAVENOUS at 12:02

## 2025-02-12 RX ADMIN — METHYLPHENIDATE HYDROCHLORIDE 10 MG: 10 TABLET ORAL at 08:02

## 2025-02-12 RX ADMIN — INSULIN GLARGINE 30 UNITS: 100 INJECTION, SOLUTION SUBCUTANEOUS at 08:02

## 2025-02-12 RX ADMIN — METOPROLOL TARTRATE 50 MG: 50 TABLET, FILM COATED ORAL at 08:02

## 2025-02-12 RX ADMIN — INSULIN ASPART 8 UNITS: 100 INJECTION, SOLUTION INTRAVENOUS; SUBCUTANEOUS at 04:02

## 2025-02-12 RX ADMIN — METHYLPHENIDATE HYDROCHLORIDE 20 MG: 5 TABLET ORAL at 04:02

## 2025-02-12 RX ADMIN — CEFEPIME 1 G: 1 INJECTION, POWDER, FOR SOLUTION INTRAMUSCULAR; INTRAVENOUS at 08:02

## 2025-02-12 RX ADMIN — METHOCARBAMOL 500 MG: 500 TABLET ORAL at 09:02

## 2025-02-12 NOTE — PT/OT/SLP PROGRESS
Physical Therapy Treatment    Patient Name:  Ezequiel Ramires   MRN:  [id number]    Recommendations:     Discharge therapy intensity: High Intensity Therapy   Discharge Equipment Recommendations:  (TBD)  Barriers to discharge: Impaired mobility, Ongoing medical needs, and placement.    Assessment:     Ezequiel Ramires is a 64 y.o. male admitted with a medical diagnosis of B SDH s/p R craniectomy, SAH, IPH, L occipital bone fx, R 4th rib fx.  He presents with the following impairments/functional limitations: weakness, impaired self care skills, impaired functional mobility, decreased lower extremity function, impaired endurance, impaired balance.    Pt tolerated session fair, limited endurance/attention, drowsy throughout tx. Tachycardia present at EOB with decreased arousal requiring return to supine. Initially able to localize to name calling but with fatigue inconsistent, able to wiggle toes to command R>L, and track (R) to midline, unable to cross midline.     Rehab Prognosis: Fair; patient would benefit from acute skilled PT services to address these deficits and reach maximum level of function.    Recent Surgery: Procedure(s) (LRB):  CRANIOTOMY, FOR SUBDURAL HEMATOMA EVACUATION (Right) 8 Days Post-Op    Plan:     During this hospitalization, patient would benefit from acute PT services 6 x/week to address the identified rehab impairments via therapeutic activities, therapeutic exercises and progress toward the following goals:    Plan of Care Expires:  03/08/25    Subjective     Chief Complaint: unable to verbalize  Patient/Family Comments/goals: unable to verbalize  Pain/Comfort:  Pain Rating 1:  (unable to verbalize)      Objective:     Communicated with RN prior to session.  Patient found supine with arterial line, blood pressure cuff, peripheral IV, PureWick, pulse ox (continuous), telemetry, tracheostomy, ventilator,  PEG tube, rectal tube  SCD upon PT entry to room.     General Precautions: Standard, fall (<140, R bone  flap pxn)  Orthopedic Precautions: N/A  Braces:  (helmet)  Respiratory Status: Ventilator SIMV; 30% FiO2; 5 PEEP; O2 94%  Blood Pressure: 129/69  Heart rate: 98 bpm at rest 115-145 bpm with activity     Therapeutic Activities:   -Bed mobility: total A x 2 for sup <> sit. Poor trunk and head control noted requiring assistance for upright posture. No protective reactions to LOB. Limited attention span requiring frequent redirection to task at hand.   - Sit to stand: total A x 2 with B HHA, blocking BLE. Minimal glut clearance, no activation noted during transfer.     Education:  Patient provided with verbal education education regarding PT role/goals/POC, fall prevention, and safety awareness.  Additional teaching is warranted.     Patient left right sidelying with all lines intact, call button in reach, wedge under L side, RN notified, and CYN's donned    GOALS:   Multidisciplinary Problems       Physical Therapy Goals          Problem: Physical Therapy    Goal Priority Disciplines Outcome Interventions   Physical Therapy Goal     PT, PT/OT Progressing    Description: Goals to be met by: d/c     Patient will increase functional independence with mobility by performin. Supine to sit with MInimal Assistance  2. Sit to supine with MInimal Assistance  3. Sit to stand transfer with Minimal Assistance  4. Bed to chair transfer with Minimal Assistance using LRAD vs pivot  5. Gait to be assessed as appropriate                         Time Tracking:     PT Received On: 25  PT Start Time: 825     PT Stop Time: 855  PT Total Time (min): 30 min     Billable Minutes: Therapeutic Activity 30    Treatment Type: Treatment  PT/PTA: PT     Number of PTA visits since last PT visit: 3     2025

## 2025-02-12 NOTE — PROGRESS NOTES
POD#8 right craniectomy for SDH  S/p trach and PEG  No sedation  Getting Ritalin and Amantadine    Temp Max 102.1/24 hrs, VSS  Pupils 3mm bilateral, reactive brisk  He intermittently opens his eyes to sternal rub  He is intermittently following commands in bilateral LE  Dressing c/d/I  Flap full but not tense  Drain site dry    Plan:   OK to leave incision open to air  Continue Q4 hour neuro exams  Continue BP parameters below 150/90  Keppra BID  SCDs and lovenox for DVT prophylaxis

## 2025-02-12 NOTE — PT/OT/SLP PROGRESS
"Occupational Therapy   Treatment    Name: Ezequiel Ramires  MRN: [id number]  Admitting Diagnosis:  SDH (subdural hematoma)  8 Days Post-Op    Recommendations:     Recommended therapy intensity at discharge: High Intensity Therapy   Discharge Equipment Recommendations:  to be determined by next level of care  Barriers to discharge:   (ongoing medical needs, severity of deficits)    Assessment:     Ezequiel Ramires is a 64 y.o. male with a medical diagnosis of B SDH s/p R craniectomy, SAH, IPH, L occipital bone fx, R 4th rib fx. Patient developed ST with bigeminy; is now s/p trach and PEG placement. Performance deficits affecting function are weakness, impaired endurance, impaired self care skills, impaired sensation, impaired functional mobility, impaired balance, visual deficits, decreased upper extremity function, decreased lower extremity function, decreased coordination, decreased safety awareness, decreased ROM. Patient participative during session; patient communicating "goodmorning" otherwise no other attempts. Tolerated sitting EOB ~8mins; seems to fatigue with decreased level of arousal and more frequent coughing fits after ~5 mins. While EOB, patient with R gaze preference however able to track R to midline, wiggling B toes (R>L), no movement noted in BUEs. Patient progressing in mobility and ADLs with attempts at standing + grooming task. Will continue to progress patient as able. Recommending high intensity therapy in a neuro rehab setting.     Rehab Prognosis:  Good; patient would benefit from acute skilled OT services to address these deficits and reach maximum level of function.       Plan:     Patient to be seen 5 x/week to address the above listed problems via self-care/home management, therapeutic activities, therapeutic exercises, neuromuscular re-education  Plan of Care Expires: 03/07/25  Plan of Care Reviewed with: patient    Subjective     Pain/Comfort:  Pain Rating 1:  (unable to state)    Objective: "     Communicated with: MILTON prior to session.  Patient found HOB elevated with blood pressure cuff, arterial line, PureWick, peripheral IV, pulse ox (continuous), telemetry, Tracheostomy, ventilator, PEG Tube (rectal tube) upon OT entry to room.    General Precautions: Standard, fall (SBP < 140, R bone flap pxs)    Orthopedic Precautions:N/A  Braces:  (helmet)  Respiratory Status: Ventilator - SIMV, 30% FiO2, PEEP 5  Vital Signs: 129/69 92 bpm (at rest) 98%  115-144 bpm with ax     Occupational Performance:     Bed Mobility:    Patient completed Rolling/Turning to Right with total assistance  Patient completed Supine to Sit with total assistance and 2 persons  Patient completed Sit to Supine with total assistance and 2 persons   Sitting EOB with maxA; multidirectional LOBs (mainly posterior) with no protective reflexes noted    Functional Mobility/Transfers:  Patient completed Sit <> Stand Transfer with total assistance and of 2 persons  with  hand-held assist ; able to achieve half stand. BLE buckling, no activation or initiation noted    Activities of Daily Living:  Grooming: total assistance [place] assist for RUE excursion to face  Lower Body Dressing: total assistance to don socks      Therapeutic Positioning    OT interventions performed during the course of today's session in an effort to prevent and/or reduce acquired pressure injuries:   Education was provided on benefits of and recommendations for therapeutic positioning  Therapeutic positioning was provided at the conclusion of session to offload all bony prominences for the prevention and/or reduction of pressure injuries  Positioning recommendations were communicated to care team       [facility] 6 Click ADL: 12    Patient Education:  Patient provided with verbal education education regarding OT role/goals/POC, fall prevention, safety awareness, Discharge/DME recommendations, and pressure ulcer prevention.  Additional teaching is warranted.      Patient left  right sidelying with all lines intact, call button in reach, wedge under L side, pressure relief boots, and NSG notified.    GOALS:   Multidisciplinary Problems       Occupational Therapy Goals          Problem: Occupational Therapy    Goal Priority Disciplines Outcome Interventions   Occupational Therapy Goal     OT, PT/OT Progressing    Description: LTG: Pt will perform basic ADLs and ADL t/fs with min A using LRAD by d/c.    STG: to be met by 3/7/25  1. Pt will follow 100% of simple one step commands across 3 sessions  2. Pt will perform grooming EOB with min A.   3. Pt will perform functional grasp/reach/release of items >80% of trials in prep for increased participation in ADL tasks.   4. Pt will perform sit<>stand with mod A in prep for ADL t/fs.  5. Pt will perform bedside commode transfer with mod A.                         Time Tracking:     OT Date of Treatment:    OT Start Time: 0825  OT Stop Time: 0852  OT Total Time (min): 27 min    Billable Minutes:Self Care/Home Management 1  Neuromuscular Re-education 1    OT/WENDI: OT     Number of WENDI visits since last OT visit: 3    2/12/2025

## 2025-02-12 NOTE — PROGRESS NOTES
Trauma ICU Progress Note    Patient Information:   Patient Name: Ezequiel Ramires                   : 1960     MRN: [id number]   Date of Admission: 2025  Code Status: Full Code  Date of Exam: 2025  HD#8  POD#8 Days Post-Op  Attending Provider: Maik Moreno Jr., *  Admission Summary:   Patient is a 64 year old  male with PMH of CAD, HTN, DM, HLD, alcoholism, arthritis, anxiety, MI who presents as a transfer from [facility] with bilateral SDH with 0.8 right to left midline shift, SAH, left occipital fracture, right 4th rib fracture. Report per wife patient was last seen normal around 1930 yesterday, she woke this morning to find him on the ground next to the bed with vomit on the floor, he was taken to the OSH at around 0500     Interval history:    Trach and peg done yesterday        Consults:   Consults: Neurosurgery, Cardiology Injuries:  1. Acute nondisplaced R 4th rib fracture  2. Tree-in-bud nodularities in the bilateral lower lobes which could be due to infectious bronchiolitis or aspiration   3. 4 mm anterolisthesis of C4 on C5 due to degeneration versus trauma  4. Nondisplaced left occipital bone fracture extending inferiorly to the level of the foramen magnum   5. Bilateral SDH  6. Bilateral IPH  7. Bilateral SAH  8. Small layering hemorrhage in the left occipital horn   9. 8 mm right to left midline shift    [x]Problems list reviewed  [x]Tertiary exam performed Operations/Procedures:  Crani 25  Percutaneous tracheostomy   PEG      Past medical history:  HTN  HLD  DMII  Psoriasis  CAD s/p stents  MI    Medications: [x] Medications reviewed/updated   Home Meds:    Current Outpatient Medications   Medication Instructions    amLODIPine (NORVASC) 5 MG tablet 1 tablet, Daily    aspirin (ECOTRIN) 81 MG EC tablet 1 tablet, Oral, Every morning    ezetimibe (ZETIA) 10 mg tablet 1 tablet, Daily    icosapent ethyL (VASCEPA) 2,000 mg, Nightly    losartan (COZAAR) 100 MG tablet 1 tablet, Nightly     metoprolol tartrate (LOPRESSOR) 50 MG tablet 1 tablet, 2 times daily    prasugreL HCl (EFFIENT) 10 mg, Daily    rosuvastatin (CRESTOR) 10 MG tablet 1 tablet, Nightly    semaglutide (OZEMPIC SUBQ) Subcutaneous      Scheduled Meds:    amantadine HCL  100 mg Per G Tube BID    amLODIPine  10 mg Per G Tube Daily    ceFEPime IV (PEDS and ADULTS)  1 g Intravenous Q6H    enoxparin  40 mg Subcutaneous Q12H (prophylaxis, 0900/2100)    famotidine  20 mg Per G Tube BID    folic acid  1 mg Per G Tube Daily    insulin glargine U-100  30 Units Subcutaneous BID    losartan  100 mg Per G Tube QHS    methocarbamoL  500 mg Per G Tube Q8H    methylphenidate HCl  10 mg Per G Tube BID WM    metoprolol tartrate  50 mg Per G Tube BID    multivitamin  1 tablet Per G Tube Daily    thiamine  100 mg Per G Tube Daily    vancomycin 1,250 mg in 0.9% NaCl 250 mL IVPB (admixture device)  1,250 mg Intravenous Q12H     Continuous Infusions:    D10W   Intravenous Continuous PRN         PRN Meds:   Current Facility-Administered Medications:     acetaminophen, 650 mg, Per G Tube, Q4H PRN    acetaminophen, 650 mg, Rectal, Q4H PRN    bisacodyL, 10 mg, Rectal, Daily PRN    ceFAZolin (Ancef) IV (PEDS and ADULTS), 2 g, Intravenous, On Call Procedure    D10W, , Intravenous, Continuous PRN    dextrose 50%, 12.5 g, Intravenous, PRN    dextrose 50%, 25 g, Intravenous, PRN    enalaprilat, 1.25 mg, Intravenous, Q4H PRN    glucagon (human recombinant), 1 mg, Intramuscular, PRN    glucagon (human recombinant), 1 mg, Intramuscular, PRN    hydrALAZINE, 10 mg, Intravenous, Q4H PRN    insulin aspart U-100, 0-10 Units, Subcutaneous, Q6H PRN    labetaloL, 20 mg, Intravenous, Q4H PRN    LORazepam, 2 mg, Per G Tube, Q4H PRN    ondansetron, 4 mg, Intravenous, Q6H PRN    oxyCODONE, 5 mg, Per G Tube, Q4H PRN    prochlorperazine, 5 mg, Intravenous, Q6H PRN    [COMPLETED] Pharmacy to dose Vancomycin consult, , , Once **AND** vancomycin - pharmacy to dose, , Intravenous,  "pharmacy to manage frequency     Vitals:  VITAL SIGNS: 24 HR MIN & MAX LAST   Temp  Min: 98.9 °F (37.2 °C)  Max: 102.1 °F (38.9 °C)  (!) 100.7 °F (38.2 °C)   BP  Min: 106/73  Max: 150/98  109/60    Pulse  Min: 85  Max: 126  91    Resp  Min: 22  Max: 37  (!) 37    SpO2  Min: 87 %  Max: 100 %  96 %      HT: 6' 0.01" (182.9 cm)  WT: 80 kg (176 lb 5.9 oz)  BMI: 23.9   Ideal Body Weight (IBW), Male: 178.06 lb  % Ideal Body Weight, Male (lb): 99.08 %        General  Exam: intubated      Neuro/Psych  GCS: 11T (E 4) (V T) (M 6)  Exam: Patient is following commands moving his toes for me and opening his eyes to command. Crani incision c/d/I [place] drain +SS drainage  ICP monitor: No  ICP treatment: No  C-Collar: No    Plan:   Bilateral SDH s/p crani-  Keppra x 7 days, HOB > 30 degrees. BP<140.   Continue amantadine, ritalin   Stop Keppra   CTM     HEENT  Exam: NCAT. Trach collar in place.     Plan:   monitoring     Pulmonary  Vitals: Resp  Av.2  Min: 22  Max: 37  SpO2  Av.4 %  Min: 87 %  Max: 100 %    Ventilator/Oxygen Settings:   Vent Mode: SIMV  Vt Set: 500 mL  Set Rate: 18 BPM  Pressure Support: 10 cmH20  I:E Ratio Measured: 1:1.8  Total PEEP: 5 cmH20 Vent Mode: SIMV (25 1000)  Ventilator Initiated: Yes (25 1240)  Set Rate: 18 BPM (25 1000)  Vt Set: 500 mL (25 1000)  Pressure Support: 10 cmH20 (25)  PEEP/CPAP: 5 cmH20 (25)  Oxygen Concentration (%): 30 (25)  Peak Airway Pressure: 16 cmH20 (25 1000)  Total Ve: 12.2 L/m (25)  F/VT Ratio<105 (RSBI): (!) 58 (25)        ABG:   Recent Labs   Lab 25   PH 7.520*   PO2 102.0*   PCO2 33.0*   HCO3 26.9*        CXR:    X-Ray Chest 1 View for Line/Tube Placement    Result Date: 2025  PICC line insertion. Confluent opacities in the left retrocardiac region with silhouetting of the left hemidiaphragm which might be related to an infiltrate/atelectasis. No other change " "Electronically signed by: Serg Mera Date:    2025 Time:    11:33        Rib fractures: right rib fracture  Chest Tube: None     Exam: Coarse BS bilaterally on minimal vent settings     Plan:     Will wean to trach collar today  CTM  Incentive Spirometry/RT Treatments: none     Cardiovascular  Vitals: Pulse  Av.6  Min: 85  Max: 126  BP  Min: 106/73  Max: 150/98  No results for input(s): "TROPONINI", "CKTOTAL", "CKMB", "BNP" in the last 168 hours.  Vasoactive Agents: None  Exam: RRR    Plan:   HTN- continue home meds and prns     Renal  Recent Labs     02/10/25  0307 25  0336 25  0340   BUN 24.7 29.5* 32.4*   CREATININE 0.80 0.79 0.74       No results for input(s): "LACTIC" in the last 72 hours.    Intake/Output - Last 3 Shifts         02/10 0700  02/11 0659  0659  07 0659    NG/GT  1587     IV Piggyback 699.7 893.5     Total Intake(mL/kg) 699.7 (8.7) 2480.5 (31)     Urine (mL/kg/hr) 1800 (0.9) 1650 (0.9)     Drains       Stool 0 400     Total Output 1800 2050     Net -1100.3 +430.5            Stool Occurrence 1 x 4 x              Intake/Output Summary (Last 24 hours) at 2025 1011  Last data filed at 2025 0604  Gross per 24 hour   Intake 2480.49 ml   Output 1525 ml   Net 955.49 ml         Radha: no    Plan:   monitor     FEN/GI  Recent Labs     02/10/25  0307 02/10/25  0650 02/10/25  1043 02/10/25  1851 25  0336 25  0340      < > 144 141 143 143   K 4.0  --   --   --  4.2 3.7   *  --   --   --  109* 107   CO2 27  --   --   --  25 26   CALCIUM 8.6*  --   --   --  7.9* 8.4*   ALBUMIN 2.8*  --   --   --  2.5* 2.5*   BILITOT 0.6  --   --   --  0.6 0.5   AST 18  --   --   --  16 20   ALKPHOS 73  --   --   --  56 69   ALT 33  --   --   --  25 23    < > = values in this interval not displayed.       Diet: NPO. Resume tube feeds s/p PEG.     Last BM: +BM    Abdominal Exam: s/NT/ND +BS. PEG tube in place.     Plan:   Tube feeds " through PEG  Bowel regimen     Heme/Onc  Recent Labs     02/10/25  0307 25  0336 25  0340   HGB 11.5* 10.9* 10.6*   HCT 36.3* 33.9* 33.1*    278 316       Transfusions (over past 24h): None    Plan:   monitor     ID  Temp  Av.2 °F (38.4 °C)  Min: 98.9 °F (37.2 °C)  Max: 102.1 °F (38.9 °C)      Recent Labs     02/10/25  0307 25  0336 25  0340   WBC 8.81 7.54 7.63       Cultures: Antibiotics:    BCX x2 () in process  RCX () Enterobacter 1. Cefepime  2. Vancomycin      Plan:   VAP- will stop vanc and continue cefepime and follow up sensitivities     Endocrine  Recent Labs     02/10/25  0307 25  0336 25  0340   GLUCOSE 305* 297* 319*      Recent Labs     02/10/25  2042 25  0814 25  1212 25  1717 25  2030 25  0012 25  0428 25  0813   POCTGLUCOSE 307* 315* 289* 269* 359* 317* 315* 305*        Plan:   DMII-continue moderate sliding scale. Increase Lantus from 40 BID.  Insulin treatment: moderate SS. 30 Lantus BID     Musculoskeletal  Weight bearing status:   RUE: WBAT  LUE: WBAT  RLE: WBAT  LLE: WBAT    Exam: does not have purposeful movement  Plan:   monitor     Wounds  Wounds exam: crani incision c/d/I RORO drain in place  Wound vac: No   Media: none  Plan: local wound care     Precautions  Precautions: Seizure     Prophylaxis  Seizure: Keppra (day 7)  DVT: Lovenox  GI: H2B     Lines/drains/airway   Lines/Drains/Airways       Drain  Duration             Male External Urinary Catheter 25 2300 Small 5 days         Gastrostomy/Enterostomy 25 1017 LUQ <1 day         Rectal Tube 25 1806 fecal management system <1 day              Airway  Duration             Adult Surgical Airway 25 0958 Shiley Cuffed 8.0 / 85 mm 1 day              Peripheral Intravenous Line  Duration                  Peripheral IV - Single Lumen 25 1730 20 G;18 G Right Antecubital 7 days         Peripheral IV - Single Lumen  02/08/25 1600 18 G Anterior;Right Upper Arm 3 days                    Plan:  Cont all lines    [x]LDA reviewed/updated      Restraints  Face to face evaluation of need for restraints on rounds today:   Currently restrained? No.        Assessment & Disposition:   Problem list:  Active Problem List with Overview Notes    Diagnosis Date Noted    Acute hypoxemic respiratory failure 02/12/2025    Dysphagia 02/12/2025    Intraparenchymal hemorrhage of brain 02/05/2025    IVH (intraventricular hemorrhage) 02/05/2025    SDH (subdural hematoma) 02/04/2025    SAH (subarachnoid hemorrhage) 02/04/2025    Closed fracture of one rib of right side 02/04/2025    Closed fracture of left side of occipital bone 02/04/2025       Guarded. Poor prognosis.  Bilateral SDH s/p crani- Keppra x 7 days (stop 2/11), HOB > 30 degrees. BP<140. Continue amantadine, ritalin.  Wean to trach collar today   HTN-  continue home meds and PRNs  VAP- Stop johnson and continue cefepime  Lovenox  PT/OT - high intensity therapy  DMII-Moderate SS and increase lantus from 30 to 40  BID  Working on Tier placement       Critical Care Time:   41 minutes of critical care was spent on this patient personally by me on the following activities: development of treatment plan with patient and bedside nurse, discussions with consultants, evaluation of patient's response to treatment, examining the patient, ordering and preforming treatments and interventions, ordering and reviewing laboratory studies, ordering and reviewing radiologic studies, and re-evaluation of patient's condition.     Daisy Moreno Jr., MD, MS  Trauma Critical Care Surgery  [facility]   02/12/2025

## 2025-02-12 NOTE — PLAN OF CARE
Updated pt and trauma team that [facility] is reviewing . I anticipate Rani [phone]   from [facility] will be following up today for info.    [facility]

## 2025-02-13 LAB
ALBUMIN SERPL-MCNC: 2.3 G/DL (ref 3.4–4.8)
ALBUMIN/GLOB SERPL: 0.6 RATIO (ref 1.1–2)
ALLENS TEST BLOOD GAS (OHS): YES
ALP SERPL-CCNC: 75 UNIT/L (ref 40–150)
ALT SERPL-CCNC: 22 UNIT/L (ref 0–55)
ANION GAP SERPL CALC-SCNC: 6 MEQ/L
AST SERPL-CCNC: 17 UNIT/L (ref 5–34)
BASE EXCESS BLD CALC-SCNC: 3.9 MMOL/L (ref -2–2)
BASOPHILS # BLD AUTO: 0.04 X10(3)/MCL
BASOPHILS NFR BLD AUTO: 0.4 %
BILIRUB SERPL-MCNC: 0.4 MG/DL
BLOOD GAS SAMPLE TYPE (OHS): ABNORMAL
BUN SERPL-MCNC: 29 MG/DL (ref 8.4–25.7)
CA-I BLD-SCNC: 1.18 MMOL/L (ref 1.12–1.23)
CALCIUM SERPL-MCNC: 8.6 MG/DL (ref 8.8–10)
CHLORIDE SERPL-SCNC: 109 MMOL/L (ref 98–107)
CO2 BLDA-SCNC: 28.7 MMOL/L
CO2 SERPL-SCNC: 25 MMOL/L (ref 23–31)
COHGB MFR BLDA: 1.9 % (ref 0.5–1.5)
CPAP BLOOD GAS (OHS): 5 CM H2O
CREAT SERPL-MCNC: 0.73 MG/DL (ref 0.72–1.25)
CREAT/UREA NIT SERPL: 40
CRP SERPL-MCNC: 121.9 MG/L
DRAWN BY BLOOD GAS (OHS): ABNORMAL
EOSINOPHIL # BLD AUTO: 0.11 X10(3)/MCL (ref 0–0.9)
EOSINOPHIL NFR BLD AUTO: 1 %
ERYTHROCYTE [DISTWIDTH] IN BLOOD BY AUTOMATED COUNT: 14.3 % (ref 11.5–17)
GFR SERPLBLD CREATININE-BSD FMLA CKD-EPI: >60 ML/MIN/1.73/M2
GLOBULIN SER-MCNC: 3.9 GM/DL (ref 2.4–3.5)
GLUCOSE SERPL-MCNC: 307 MG/DL (ref 82–115)
HCO3 BLDA-SCNC: 27.6 MMOL/L (ref 22–26)
HCT VFR BLD AUTO: 34.2 % (ref 42–52)
HGB BLD-MCNC: 11 G/DL (ref 14–18)
IMM GRANULOCYTES # BLD AUTO: 0.06 X10(3)/MCL (ref 0–0.04)
IMM GRANULOCYTES NFR BLD AUTO: 0.5 %
INHALED O2 CONCENTRATION: 30 %
LYMPHOCYTES # BLD AUTO: 1.41 X10(3)/MCL (ref 0.6–4.6)
LYMPHOCYTES NFR BLD AUTO: 12.6 %
MCH RBC QN AUTO: 29.1 PG (ref 27–31)
MCHC RBC AUTO-ENTMCNC: 32.2 G/DL (ref 33–36)
MCV RBC AUTO: 90.5 FL (ref 80–94)
METHGB MFR BLDA: 1.2 % (ref 0.4–1.5)
MODE (OHS): ABNORMAL
MONOCYTES # BLD AUTO: 1.06 X10(3)/MCL (ref 0.1–1.3)
MONOCYTES NFR BLD AUTO: 9.5 %
NEUTROPHILS # BLD AUTO: 8.52 X10(3)/MCL (ref 2.1–9.2)
NEUTROPHILS NFR BLD AUTO: 76 %
NRBC BLD AUTO-RTO: 0 %
O2 HB BLOOD GAS (OHS): 96 % (ref 94–97)
OXYGEN DEVICE BLOOD GAS (OHS): ABNORMAL
OXYHGB MFR BLDA: 10.8 G/DL (ref 12–16)
PCO2 BLDA: 37 MMHG (ref 35–45)
PH BLDA: 7.48 [PH] (ref 7.35–7.45)
PLATELET # BLD AUTO: 286 X10(3)/MCL (ref 130–400)
PMV BLD AUTO: 11.3 FL (ref 7.4–10.4)
PO2 BLDA: 92 MMHG (ref 80–100)
POCT GLUCOSE: 230 MG/DL (ref 70–110)
POCT GLUCOSE: 236 MG/DL (ref 70–110)
POCT GLUCOSE: 275 MG/DL (ref 70–110)
POCT GLUCOSE: 285 MG/DL (ref 70–110)
POCT GLUCOSE: 303 MG/DL (ref 70–110)
POCT GLUCOSE: 321 MG/DL (ref 70–110)
POTASSIUM BLOOD GAS (OHS): 3.5 MMOL/L (ref 3.5–5)
POTASSIUM SERPL-SCNC: 3.7 MMOL/L (ref 3.5–5.1)
PREALB SERPL-MCNC: 14.2 MG/DL (ref 16–42)
PROT SERPL-MCNC: 6.2 GM/DL (ref 5.8–7.6)
PS (OHS): 12 CMH2O
RBC # BLD AUTO: 3.78 X10(6)/MCL (ref 4.7–6.1)
SAMPLE SITE BLOOD GAS (OHS): ABNORMAL
SAO2 % BLDA: 97.7 %
SODIUM BLOOD GAS (OHS): 140 MMOL/L (ref 137–145)
SODIUM SERPL-SCNC: 140 MMOL/L (ref 136–145)
SPONT RR (OHS): 28 B/MIN
WBC # BLD AUTO: 11.2 X10(3)/MCL (ref 4.5–11.5)

## 2025-02-13 PROCEDURE — 82803 BLOOD GASES ANY COMBINATION: CPT

## 2025-02-13 PROCEDURE — 97530 THERAPEUTIC ACTIVITIES: CPT

## 2025-02-13 PROCEDURE — 25000003 PHARM REV CODE 250

## 2025-02-13 PROCEDURE — 27100171 HC OXYGEN HIGH FLOW UP TO 24 HOURS

## 2025-02-13 PROCEDURE — 36415 COLL VENOUS BLD VENIPUNCTURE: CPT

## 2025-02-13 PROCEDURE — 85025 COMPLETE CBC W/AUTO DIFF WBC: CPT

## 2025-02-13 PROCEDURE — 94760 N-INVAS EAR/PLS OXIMETRY 1: CPT

## 2025-02-13 PROCEDURE — 27200966 HC CLOSED SUCTION SYSTEM

## 2025-02-13 PROCEDURE — 99900022

## 2025-02-13 PROCEDURE — 99900031 HC PATIENT EDUCATION (STAT)

## 2025-02-13 PROCEDURE — 94003 VENT MGMT INPAT SUBQ DAY: CPT

## 2025-02-13 PROCEDURE — 5A09357 ASSISTANCE WITH RESPIRATORY VENTILATION, LESS THAN 24 CONSECUTIVE HOURS, CONTINUOUS POSITIVE AIRWAY PRESSURE: ICD-10-PCS | Performed by: SURGERY

## 2025-02-13 PROCEDURE — 99900035 HC TECH TIME PER 15 MIN (STAT)

## 2025-02-13 PROCEDURE — 84134 ASSAY OF PREALBUMIN: CPT

## 2025-02-13 PROCEDURE — 63600175 PHARM REV CODE 636 W HCPCS

## 2025-02-13 PROCEDURE — 99291 CRITICAL CARE FIRST HOUR: CPT | Mod: ,,, | Performed by: SURGERY

## 2025-02-13 PROCEDURE — 86140 C-REACTIVE PROTEIN: CPT

## 2025-02-13 PROCEDURE — 94761 N-INVAS EAR/PLS OXIMETRY MLT: CPT

## 2025-02-13 PROCEDURE — 36600 WITHDRAWAL OF ARTERIAL BLOOD: CPT

## 2025-02-13 PROCEDURE — 20800000 HC ICU TRAUMA

## 2025-02-13 PROCEDURE — 80053 COMPREHEN METABOLIC PANEL: CPT

## 2025-02-13 PROCEDURE — 25000003 PHARM REV CODE 250: Performed by: SURGERY

## 2025-02-13 PROCEDURE — 99900026 HC AIRWAY MAINTENANCE (STAT)

## 2025-02-13 PROCEDURE — 63600175 PHARM REV CODE 636 W HCPCS: Performed by: SURGERY

## 2025-02-13 PROCEDURE — 97112 NEUROMUSCULAR REEDUCATION: CPT

## 2025-02-13 RX ORDER — INSULIN ASPART 100 [IU]/ML
0-15 INJECTION, SOLUTION INTRAVENOUS; SUBCUTANEOUS EVERY 6 HOURS PRN
Status: DISCONTINUED | OUTPATIENT
Start: 2025-02-13 | End: 2025-02-21 | Stop reason: HOSPADM

## 2025-02-13 RX ORDER — GLUCAGON 1 MG
1 KIT INJECTION
Status: DISCONTINUED | OUTPATIENT
Start: 2025-02-13 | End: 2025-02-21 | Stop reason: HOSPADM

## 2025-02-13 RX ADMIN — METHOCARBAMOL 500 MG: 500 TABLET ORAL at 01:02

## 2025-02-13 RX ADMIN — INSULIN ASPART 8 UNITS: 100 INJECTION, SOLUTION INTRAVENOUS; SUBCUTANEOUS at 04:02

## 2025-02-13 RX ADMIN — ENOXAPARIN SODIUM 40 MG: 40 INJECTION SUBCUTANEOUS at 08:02

## 2025-02-13 RX ADMIN — FAMOTIDINE 20 MG: 20 TABLET, FILM COATED ORAL at 08:02

## 2025-02-13 RX ADMIN — CEFEPIME 1 G: 1 INJECTION, POWDER, FOR SOLUTION INTRAMUSCULAR; INTRAVENOUS at 07:02

## 2025-02-13 RX ADMIN — METHOCARBAMOL 500 MG: 500 TABLET ORAL at 05:02

## 2025-02-13 RX ADMIN — INSULIN ASPART 6 UNITS: 100 INJECTION, SOLUTION INTRAVENOUS; SUBCUTANEOUS at 12:02

## 2025-02-13 RX ADMIN — LOSARTAN POTASSIUM 100 MG: 50 TABLET, FILM COATED ORAL at 08:02

## 2025-02-13 RX ADMIN — METHYLPHENIDATE HYDROCHLORIDE 20 MG: 5 TABLET ORAL at 08:02

## 2025-02-13 RX ADMIN — INSULIN GLARGINE 40 UNITS: 100 INJECTION, SOLUTION SUBCUTANEOUS at 08:02

## 2025-02-13 RX ADMIN — ACETAMINOPHEN 650 MG: 650 SOLUTION ORAL at 04:02

## 2025-02-13 RX ADMIN — METHOCARBAMOL 500 MG: 500 TABLET ORAL at 10:02

## 2025-02-13 RX ADMIN — CEFEPIME 1 G: 1 INJECTION, POWDER, FOR SOLUTION INTRAMUSCULAR; INTRAVENOUS at 01:02

## 2025-02-13 RX ADMIN — CEFEPIME 1 G: 1 INJECTION, POWDER, FOR SOLUTION INTRAMUSCULAR; INTRAVENOUS at 08:02

## 2025-02-13 RX ADMIN — INSULIN ASPART 6 UNITS: 100 INJECTION, SOLUTION INTRAVENOUS; SUBCUTANEOUS at 04:02

## 2025-02-13 RX ADMIN — CEFEPIME 1 G: 1 INJECTION, POWDER, FOR SOLUTION INTRAMUSCULAR; INTRAVENOUS at 12:02

## 2025-02-13 RX ADMIN — AMANTADINE HYDROCHLORIDE 100 MG: 50 SOLUTION ORAL at 08:02

## 2025-02-13 RX ADMIN — AMLODIPINE BESYLATE 10 MG: 5 TABLET ORAL at 08:02

## 2025-02-13 RX ADMIN — FENTANYL CITRATE 50 MCG: 50 INJECTION, SOLUTION INTRAMUSCULAR; INTRAVENOUS at 12:02

## 2025-02-13 RX ADMIN — METHYLPHENIDATE HYDROCHLORIDE 20 MG: 5 TABLET ORAL at 04:02

## 2025-02-13 RX ADMIN — INSULIN ASPART 9 UNITS: 100 INJECTION, SOLUTION INTRAVENOUS; SUBCUTANEOUS at 08:02

## 2025-02-13 RX ADMIN — FOLIC ACID 1 MG: 1 TABLET ORAL at 08:02

## 2025-02-13 RX ADMIN — METOPROLOL TARTRATE 50 MG: 50 TABLET, FILM COATED ORAL at 08:02

## 2025-02-13 RX ADMIN — THERA TABS 1 TABLET: TAB at 08:02

## 2025-02-13 RX ADMIN — THIAMINE HCL TAB 100 MG 100 MG: 100 TAB at 08:02

## 2025-02-13 RX ADMIN — ACETAMINOPHEN 650 MG: 650 SOLUTION ORAL at 08:02

## 2025-02-13 NOTE — PT/OT/SLP PROGRESS
"Occupational Therapy   Treatment    Name: Ezequiel Ramires  MRN: [id number]  Admitting Diagnosis:  SDH (subdural hematoma)  9 Days Post-Op    Recommendations:     Recommended therapy intensity at discharge: High Intensity Therapy   Discharge Equipment Recommendations:  to be determined by next level of care  Barriers to discharge:   (ongoing medical needs, severity of deficits)    Assessment:     Ezequiel Ramires is a 64 y.o. male with a medical diagnosis of B SDH s/p R craniectomy, SAH, IPH, L occipital bone fx, R 4th rib fx. Patient developed ST with bigeminy; is now s/p trach and PEG placement. Performance deficits affecting function are weakness, impaired endurance, impaired self care skills, impaired sensation, impaired functional mobility, impaired balance, visual deficits, decreased upper extremity function, decreased lower extremity function, decreased coordination, decreased safety awareness, decreased ROM. Patient with increased participation and significant progression made during therapy session. Patient followed majority of simple one step commands. Patient communicated "goodmorning", "hey", the appropriate color of the cone, and 3/5 trials of "thumbs up". Patient performed neuro re education activities seated EOB ~10mins prior to transferring bed > chair. While EOB, patient tracking L&R, wiggling B toes (L>R), kicking BLEs, and movement noted in BUEs; 2-/5 digit flexion, 1/5 elbow flexion (L>R in all). Great candidate for high intensity therapy in a neuro rehab setting.     Rehab Prognosis:  Good; patient would benefit from acute skilled OT services to address these deficits and reach maximum level of function.       Plan:     Patient to be seen 5 x/week to address the above listed problems via self-care/home management, therapeutic activities, therapeutic exercises, neuromuscular re-education  Plan of Care Expires: 03/07/25  Plan of Care Reviewed with: patient    Subjective     Pain/Comfort:  Pain Rating 1: " "0/10    Objective:     Communicated with: MILTON prior to session.  Patient found HOB elevated with blood pressure cuff, arterial line, PureWick, peripheral IV, pulse ox (continuous), telemetry, Tracheostomy, ventilator, PEG Tube (rectal tube) upon OT entry to room.    General Precautions: Standard, fall (SBP < 140, R bone flap pxs)    Orthopedic Precautions:N/A  Braces:  (helmet)  Respiratory Status: Ventilator - CPAP, 30% FiO2, PEEP 5  Vital Signs: 115/68 82 bpm (at rest) 98%  135/71 at conclusion of session     Occupational Performance:     Bed Mobility:    Patient completed Supine to Sit with maximal assistance and 2 persons  Patient completed Sit to Supine with maximal assistance and 2 persons   Sitting EOB with maxA; multidirectional LOBs; with no protective reflexes noted; stooped posture and what appears to be low trunk tone    Functional Mobility/Transfers:  Patient completed Sit <> Stand Transfer with maximal assistance and of 2 persons  with  hand-held assist ; able to achieve half stand. BLE buckling, no activation or initiation noted  Patient completed bed > chair transfer with maximal assistance and of 2 persons via squat pivot using HHA; while seated in chair, noted to have R lateral lean, required wedge for appropriate midline    Cognitive retraining:  Patient consistently following motor commands with no object involvement (I.e. "kick your leg") however inconsistent with object involvement (I.e. "kick the cone")    Range of Motion/Manual Muscle Test    RUE  ROM Limitations  5 4+ 4 4- 3+ 3 3- 2+ 2 2- 1 0   Scapular Elevation  []   []   []   []   []  []  []  []  []  []  []  []    Scapular Retraction  [] [] [] [] [] [] [] [] [] [] [] []   Shoulder Flexion  []   []   []   []   []  []  []  []  []  []  []  []    Shoulder Abduction  []   []   []   []   []  []  []  []  []  []  []  []    Shoulder Extension  []   []   []   []   []  []  []  []  []  []  []  []    Shoulder External Rotation  []   []   []   []   []  " []  []  []  []  []  []  []    Shoulder Internal Rotation  []   []   []   []   []  []  []  []  []  []  []  []    Elbow Flexion  []   []   []   []   []  []  []  []  []  []  [x]  []    Elbow Extension  []   []   []   []   []  []  []  []  []  []  []  []    Forearm Pronation  []   []   []   []   []  []  []  []  []  []  []  []    Forearm Supination  []   []   []   []   []  []  []  []  []  []  []  []    Wrist Flexion  []   []   []   []   []  []  []  []  []  []  []  []    Wrist Extension  []   []   []   []   []  []  []  []  []  []  []  []    Finger Flexion  []   []   []   []   []  []  []  []  []  [x]  []  []    Finger Extension  [] [] [] [] [] [] [] [] [] [] [] []   Thumb Opposition  [] [] [] [] [] [] [] [] [] [] [] []     LUE  ROM Limitations 5 4+ 4 4- 3+ 3 3- 2+ 2 2- 1 0   Scapular Elevation  []   []   []   []   []  []  []  []  []  []  []  []    Scapular Retraction  [] [] [] [] [] [] [] [] [] [] [] []   Shoulder Flexion  []   []   []   []   []  []  []  []  []  []  []  []    Shoulder Abduction  []   []   []   []   []  []  []  []  []  []  []  []    Shoulder Extension  []   []   []   []   []  []  []  []  []  []  []  []    Shoulder External Rotation  []   []   []   []   []  []  []  []  []  []  []  []    Shoulder Internal Rotation  []   []   []   []   []  []  []  []  []  []  []  []    Elbow Flexion  []   []   []   []   []  []  []  []  []  []  [x]  []    Elbow Extension  []   []   []   []   []  []  []  []  []  []  []  []    Forearm Pronation  []   []   []   []   []  []  []  []  []  []  []  []    Forearm Supination  []   []   []   []   []  []  []  []  []  []  []  []    Wrist Flexion  []   []   []   []   []  []  []  []  []  []  []  []    Wrist Extension  []   []   []   []   []  []  []  []  []  []  []  []    Finger Flexion  []   []   []   []   []  []  []  []  []  [x]  []  []    Finger Extension  [] [] [] [] [] [] [] [] [] [] [] []   Thumb Opposition  [] [] [] [] [] [] [] [] [] [] [] []       Therapeutic Positioning    OT  interventions performed during the course of today's session in an effort to prevent and/or reduce acquired pressure injuries:   Education was provided on benefits of and recommendations for therapeutic positioning  Therapeutic positioning was provided at the conclusion of session to offload all bony prominences for the prevention and/or reduction of pressure injuries  Positioning recommendations were communicated to care team       [facility] 6 Click ADL: 12    Patient Education:  Patient provided with verbal education education regarding OT role/goals/POC, fall prevention, safety awareness, Discharge/DME recommendations, and pressure ulcer prevention.  Additional teaching is warranted.      Patient left up in chair with all lines intact, call button in reach, wedge under R side, patience pad placed, and NSG notified.    GOALS:   Multidisciplinary Problems       Occupational Therapy Goals          Problem: Occupational Therapy    Goal Priority Disciplines Outcome Interventions   Occupational Therapy Goal     OT, PT/OT Progressing    Description: LTG: Pt will perform basic ADLs and ADL t/fs with min A using LRAD by d/c.    STG: to be met by 3/7/25  1. Pt will follow 100% of simple one step commands across 3 sessions  2. Pt will perform grooming EOB with min A.   3. Pt will perform functional grasp/reach/release of items >80% of trials in prep for increased participation in ADL tasks.   4. Pt will perform sit<>stand with mod A in prep for ADL t/fs.  5. Pt will perform bedside commode transfer with mod A.                         Time Tracking:     OT Date of Treatment:    OT Start Time: 0937  OT Stop Time: 1008  OT Total Time (min): 31 min    Billable Minutes:Neuromuscular Re-education 2  OT/WENDI: OT     Number of WENDI visits since last OT visit: 4    2/13/2025

## 2025-02-13 NOTE — PROGRESS NOTES
Trauma ICU Progress Note    Patient Information:   Patient Name: Ezequiel Ramires                   : 1960     MRN: [id number]   Date of Admission: 2025  Code Status: Full Code  Date of Exam: 2025  HD#9  POD#9 Days Post-Op  Attending Provider: Maik Moreno Jr., *  Admission Summary:   Patient is a 64 year old  male with PMH of CAD, HTN, DM, HLD, alcoholism, arthritis, anxiety, MI who presents as a transfer from [facility] with bilateral SDH with 0.8 right to left midline shift, SAH, left occipital fracture, right 4th rib fracture. Report per wife patient was last seen normal around 1930 yesterday, she woke this morning to find him on the ground next to the bed with vomit on the floor, he was taken to the OSH at around 0500     Interval history:    More awake this AM on CPAP    Consults:   Consults: Neurosurgery, Cardiology Injuries:  1. Acute nondisplaced R 4th rib fracture  2. Tree-in-bud nodularities in the bilateral lower lobes which could be due to infectious bronchiolitis or aspiration   3. 4 mm anterolisthesis of C4 on C5 due to degeneration versus trauma  4. Nondisplaced left occipital bone fracture extending inferiorly to the level of the foramen magnum   5. Bilateral SDH  6. Bilateral IPH  7. Bilateral SAH  8. Small layering hemorrhage in the left occipital horn   9. 8 mm right to left midline shift    [x]Problems list reviewed  [x]Tertiary exam performed Operations/Procedures:  Crani 25  Percutaneous tracheostomy   PEG      Past medical history:  HTN  HLD  DMII  Psoriasis  CAD s/p stents  MI    Medications: [x] Medications reviewed/updated   Home Meds:    Current Outpatient Medications   Medication Instructions    amLODIPine (NORVASC) 5 MG tablet 1 tablet, Daily    aspirin (ECOTRIN) 81 MG EC tablet 1 tablet, Oral, Every morning    ezetimibe (ZETIA) 10 mg tablet 1 tablet, Daily    icosapent ethyL (VASCEPA) 2,000 mg, Nightly    losartan (COZAAR) 100 MG tablet 1 tablet, Nightly     metoprolol tartrate (LOPRESSOR) 50 MG tablet 1 tablet, 2 times daily    prasugreL HCl (EFFIENT) 10 mg, Daily    rosuvastatin (CRESTOR) 10 MG tablet 1 tablet, Nightly    semaglutide (OZEMPIC SUBQ) Subcutaneous      Scheduled Meds:    amantadine HCL  100 mg Per G Tube BID    amLODIPine  10 mg Per G Tube Daily    ceFEPime IV (PEDS and ADULTS)  1 g Intravenous Q6H    enoxparin  40 mg Subcutaneous Q12H (prophylaxis, 0900/2100)    famotidine  20 mg Per G Tube BID    folic acid  1 mg Per G Tube Daily    insulin glargine U-100  40 Units Subcutaneous BID    losartan  100 mg Per G Tube QHS    methocarbamoL  500 mg Per G Tube Q8H    methylphenidate HCl  20 mg Per G Tube BID WM    metoprolol tartrate  50 mg Per G Tube BID    multivitamin  1 tablet Per G Tube Daily    thiamine  100 mg Per G Tube Daily     Continuous Infusions:    D10W   Intravenous Continuous PRN         PRN Meds:   Current Facility-Administered Medications:     acetaminophen, 650 mg, Per G Tube, Q4H PRN    acetaminophen, 650 mg, Rectal, Q4H PRN    bisacodyL, 10 mg, Rectal, Daily PRN    ceFAZolin (Ancef) IV (PEDS and ADULTS), 2 g, Intravenous, On Call Procedure    D10W, , Intravenous, Continuous PRN    dextrose 50%, 12.5 g, Intravenous, PRN    dextrose 50%, 25 g, Intravenous, PRN    enalaprilat, 1.25 mg, Intravenous, Q4H PRN    fentaNYL, 50 mcg, Intravenous, Q2H PRN    glucagon (human recombinant), 1 mg, Intramuscular, PRN    glucagon (human recombinant), 1 mg, Intramuscular, PRN    hydrALAZINE, 10 mg, Intravenous, Q4H PRN    insulin aspart U-100, 0-15 Units, Subcutaneous, Q6H PRN    labetaloL, 20 mg, Intravenous, Q4H PRN    LORazepam, 2 mg, Per G Tube, Q4H PRN    ondansetron, 4 mg, Intravenous, Q6H PRN    oxyCODONE, 5 mg, Per G Tube, Q4H PRN    prochlorperazine, 5 mg, Intravenous, Q6H PRN     Vitals:  VITAL SIGNS: 24 HR MIN & MAX LAST   Temp  Min: 99.6 °F (37.6 °C)  Max: 102.1 °F (38.9 °C)  (!) 100.7 °F (38.2 °C)   BP  Min: 100/68  Max: 154/74  112/66    Pulse   "Min: 79  Max: 116  87    Resp  Min: 21  Max: 29  (!) 29    SpO2  Min: 91 %  Max: 100 %  97 %      HT: 6' 0.01" (182.9 cm)  WT: 80 kg (176 lb 5.9 oz)  BMI: 23.9   Ideal Body Weight (IBW), Male: 178.06 lb  % Ideal Body Weight, Male (lb): 99.08 %        General  Exam: intubated      Neuro/Psych  GCS: 11T (E 4) (V T) (M 6)  Exam: Patient is following commands moving his toes for me and opening his eyes to command. Crani incision c/d/I Corbin drain +SS drainage  ICP monitor: No  ICP treatment: No  C-Collar: No    Plan:   Bilateral SDH s/p crani-  Keppra x 7 days, HOB > 30 degrees. BP<140.   Continue amantadine, ritalin   Stop Keppra   CTM     HEENT  Exam: NCAT. Trach collar in place.     Plan:   monitoring     Pulmonary  Vitals: Resp  Av.8  Min: 21  Max: 29  SpO2  Av.8 %  Min: 91 %  Max: 100 %    Ventilator/Oxygen Settings:   Vent Mode: CPAP / PSV  Vt Set: 500 mL  Set Rate: 6 BPM  Pressure Support: (S) 10 cmH20 (weaning)  I:E Ratio Measured: 1:2.2  Total PEEP: 5 cmH20 Vent Mode: CPAP / PSV (25)  Ventilator Initiated: Yes (25 1240)  Set Rate: 6 BPM (25)  Vt Set: 500 mL (25)  Pressure Support: (S) 10 cmH20 (weaning) (25)  PEEP/CPAP: 5 cmH20 (25)  Oxygen Concentration (%): 30 (25)  Peak Airway Pressure: 16 cmH20 (25)  Total Ve: 14.7 L/m (25)  F/VT Ratio<105 (RSBI): (!) 43.03 (25)        ABG:   Recent Labs   Lab 25   PH 7.480*   PO2 92.0   PCO2 37.0   HCO3 27.6*        CXR:    X-Ray Chest 1 View for Line/Tube Placement    Result Date: 2025  PICC line insertion. Confluent opacities in the left retrocardiac region with silhouetting of the left hemidiaphragm which might be related to an infiltrate/atelectasis. No other change Electronically signed by: Serg Mera Date:    2025 Time:    11:33        Rib fractures: right rib fracture  Chest Tube: None     Exam: Coarse BS bilaterally on " "minimal vent settings     Plan:     Trach collar today   CTM  Incentive Spirometry/RT Treatments: none     Cardiovascular  Vitals: Pulse  Av.1  Min: 79  Max: 116  BP  Min: 100/68  Max: 154/74  No results for input(s): "TROPONINI", "CKTOTAL", "CKMB", "BNP" in the last 168 hours.  Vasoactive Agents: None  Exam: RRR    Plan:   HTN- continue home meds and prns     Renal  Recent Labs     25  0336 25  0340 25  0355   BUN 29.5* 32.4* 29.0*   CREATININE 0.79 0.74 0.73       No results for input(s): "LACTIC" in the last 72 hours.    Intake/Output - Last 3 Shifts          07 0659  07 0659  07 0659    NG/GT 1587 2375     IV Piggyback 893.5      Total Intake(mL/kg) 2480.5 (31) 2375 (29.7)     Urine (mL/kg/hr) 1650 (0.9) 2600 (1.4)     Stool 400 100     Total Output 2050 2700     Net +430.5 -325            Stool Occurrence 4 x 2 x              Intake/Output Summary (Last 24 hours) at 2025 1005  Last data filed at 2025 0600  Gross per 24 hour   Intake 2375 ml   Output 2700 ml   Net -325 ml         Garcia: no    Plan:   monitor     FEN/GI  Recent Labs     02/10/25  1851 25  0336 25  0340 25  0355    143 143 140   K  --  4.2 3.7 3.7   CL  --  109* 107 109*   CO2  --  25 26 25   CALCIUM  --  7.9* 8.4* 8.6*   ALBUMIN  --  2.5* 2.5* 2.3*   BILITOT  --  0.6 0.5 0.4   AST  --  16 20 17   ALKPHOS  --  56 69 75   ALT  --  25 23 22       Diet: NPO. Resume tube feeds s/p PEG.     Last BM: +BM    Abdominal Exam: s/NT/ND +BS. PEG tube in place.     Plan:   Tube feeds through PEG  Bowel regimen     Heme/Onc  Recent Labs     25  0336 25  0340 25  0355   HGB 10.9* 10.6* 11.0*   HCT 33.9* 33.1* 34.2*    316 286       Transfusions (over past 24h): None    Plan:   monitor     ID  Temp  Av °F (38.3 °C)  Min: 99.6 °F (37.6 °C)  Max: 102.1 °F (38.9 °C)      Recent Labs     25  0336 25   WBC " 7.54 7.63 11.20       Cultures: Antibiotics:    BCX x2 (2/11) in process  RCX (2/11) Enterobacter 1. Cefepime       Plan:   VAP- Cefepime to completion Saturday 2/15     Endocrine  Recent Labs     02/11/25  0336 02/12/25  0340 02/13/25  0355   GLUCOSE 297* 319* 307*      Recent Labs     02/12/25  0428 02/12/25  0813 02/12/25  1240 02/12/25  1616 02/12/25  2026 02/12/25  2353 02/13/25  0401 02/13/25  0812   POCTGLUCOSE 315* 305* 290* 283* 312* 366* 303* 285*        Plan:   DMII-changed to high sliding scale . Increase Lantus 40 BID.  Insulin treatment: moderate SS. 30 Lantus BID     Musculoskeletal  Weight bearing status:   RUE: WBAT  LUE: WBAT  RLE: WBAT  LLE: WBAT    Exam: does not have purposeful movement  Plan:   monitor     Wounds  Wounds exam: crani incision c/d/I RORO drain in place  Wound vac: No   Media: none  Plan: local wound care     Precautions  Precautions: Seizure     Prophylaxis  Seizure: Keppra completed  DVT: Lovenox  GI: H2B     Lines/drains/airway   Lines/Drains/Airways       Drain  Duration             Male External Urinary Catheter 02/06/25 2300 Small 6 days         Gastrostomy/Enterostomy 02/11/25 1017 LUQ 1 day         Rectal Tube 02/11/25 1806 fecal management system 1 day              Airway  Duration             Adult Surgical Airway 02/11/25 0958 Shiley Cuffed 8.0 / 85 mm 2 days              Peripheral Intravenous Line  Duration                  Peripheral IV - Single Lumen 02/04/25 1730 20 G;18 G Right Antecubital 8 days         Peripheral IV - Single Lumen 02/08/25 1600 18 G Anterior;Right Upper Arm 4 days                    Plan:  Cont all lines    [x]LDA reviewed/updated      Restraints  Face to face evaluation of need for restraints on rounds today:   Currently restrained? No.        Assessment & Disposition:   Problem list:  Active Problem List with Overview Notes    Diagnosis Date Noted    Acute hypoxemic respiratory failure 02/12/2025    Dysphagia 02/12/2025    VAP  (ventilator-associated pneumonia) 02/12/2025    Intraparenchymal hemorrhage of brain 02/05/2025    IVH (intraventricular hemorrhage) 02/05/2025    SDH (subdural hematoma) 02/04/2025    SAH (subarachnoid hemorrhage) 02/04/2025    Closed fracture of one rib of right side 02/04/2025    Closed fracture of left side of occipital bone 02/04/2025       Guarded. Poor prognosis.  Bilateral SDH s/p crani- Keppra x 7 days (stop 2/11), HOB > 30 degrees. BP<140. Continue amantadine, ritalin.  Wean to trach collar today   HTN-  continue home meds and PRNs  VAP- Cefepime to Saturday 2/15  Lovenox  PT/OT - high intensity therapy  DMII-increased to high sliding scale; latus 40 BID  Working on Tier placement       Critical Care Time:   41 minutes of critical care was spent on this patient personally by me on the following activities: development of treatment plan with patient and bedside nurse, discussions with consultants, evaluation of patient's response to treatment, examining the patient, ordering and preforming treatments and interventions, ordering and reviewing laboratory studies, ordering and reviewing radiologic studies, and re-evaluation of patient's condition.     Daisy Mroeno Jr., MD, MS  Trauma Critical Care Surgery  [facility]   02/13/2025

## 2025-02-13 NOTE — PT/OT/SLP PROGRESS
Physical Therapy Treatment    Patient Name:  Ezequiel Ramires   MRN:  [id number]    Recommendations:     Discharge therapy intensity: High Intensity Therapy   Discharge Equipment Recommendations:  (TBD)  Barriers to discharge: Impaired mobility, Ongoing medical needs, and placement    Assessment:     Ezequiel Ramires is a 64 y.o. male admitted with a medical diagnosis of B SDH s/p R craniectomy, SAH, IPH, L occipital bone fx, R 4th rib fx. Patient developed ST with bigeminy; is now s/p trach and PEG placement.  He presents with the following impairments/functional limitations: weakness, impaired self care skills, impaired functional mobility, decreased lower extremity function, impaired endurance, impaired balance.    Patient with improved participation in therapy this date. Pt consistently follows majority of simple one-step commands, less consistent with object-related motor commands. Able to wiffle toes and kick BLE (L>R) against gravity on command. Performed squat pivot t/f from bed>chair with maxA x 2 people. Pt continues to be a great candidate for high intensity therapy at d/c in a neuro rehab setting.    Rehab Prognosis: Good; patient would benefit from acute skilled PT services to address these deficits and reach maximum level of function.    Recent Surgery: Procedure(s) (LRB):  CRANIOTOMY, FOR SUBDURAL HEMATOMA EVACUATION (Right) 9 Days Post-Op    Plan:     During this hospitalization, patient would benefit from acute PT services 6 x/week to address the identified rehab impairments via therapeutic activities, therapeutic exercises and progress toward the following goals:    Plan of Care Expires:  03/08/25    Subjective     Chief Complaint: none verbalized  Patient/Family Comments/goals: none verbalized  Pain/Comfort:  Pain Rating 1: 0/10      Objective:     Communicated with RN prior to session.  Patient found HOB elevated with blood pressure cuff, telemetry, PureWick, bowel management system, peripheral IV, ventilator,  "Tracheostomy, PEG Tube, PRAFO, pulse ox (continuous), SCD upon PT entry to room.     General Precautions: Standard, fall (SBP < 140, R bone flap pxs)  Orthopedic Precautions: N/A  Braces:  (helmet)  Respiratory Status: Ventilator CPAP, 30% FiO2, PEEP 5, SpO2 98%  Blood Pressure: 115/68, HR 83; /71 at end of session  Skin Integrity: Visible skin intact      Functional Mobility:  Bed Mobility:     Supine to Sit: maximal assistance and of 2 persons  Transfers:     Sit to Stand:  maximal assistance and of 2 persons with hand-held assist, BLE blocked to prevent buckling and did clear bed but unable to come to full stand  Bed to Chair: maximal assistance and of 2 persons with  no AD  using  Squat Pivot  Balance: static sitting balance EOB maxA 2/2 low trunk tone and poor trunk/head control, pt with very stooped/kyphotic posture  pt with R lateral lean when sitting in recliner chair requiring wedge under R side to position to midline    Therapeutic Activities/Exercises:  Patient sat EOB x10 mins with maxA for balance. Patient is able to consistently follow simple non-object related motor commands such as "kick your leg", however is inconsistent with object-related command of "kick the cone" (obeyed command 1/3 times with RLE only). When kicking legs, patient with increased movement L>R. Pt also able to correctly mouth "orange" when asked what color the cone was.    Education:  Patient and spouse were provided with verbal education education regarding PT role/goals/POC, fall prevention, and safety awareness.  Additional teaching is warranted.     Patient left up in chair with all lines intact, call button in reach, wedge under R side, patience pad in place, RN notified, spouse present, and posey vest donjesús    GOALS:   Multidisciplinary Problems       Physical Therapy Goals          Problem: Physical Therapy    Goal Priority Disciplines Outcome Interventions   Physical Therapy Goal     PT, PT/OT Progressing  "   Description: Goals to be met by: d/c     Patient will increase functional independence with mobility by performin. Supine to sit with MInimal Assistance  2. Sit to supine with MInimal Assistance  3. Sit to stand transfer with Minimal Assistance  4. Bed to chair transfer with Minimal Assistance using LRAD vs pivot  5. Gait to be assessed as appropriate                         Time Tracking:     PT Received On: 25  PT Start Time: 09     PT Stop Time: 1004  PT Total Time (min): 26 min     Billable Minutes: Therapeutic Activity 26    Treatment Type: Treatment  PT/PTA: PT     Number of PTA visits since last PT visit: 4     2025

## 2025-02-13 NOTE — PLAN OF CARE
Discussed pt in trauma multidisciplinary meeting this am. Spoke with Rani at [facility]. Updates sent including MAR, therapy, progress notes. Communication included that pt has no drains. Is not being suctioned and is being placed on trach collar now.  Placement pending success with trach collar and insurer approval   I did request a disc from radiology.

## 2025-02-13 NOTE — PROGRESS NOTES
POD#9 right craniectomy for SDH  S/p trach and PEG  No sedation  Getting Ritalin and Amantadine  He is sitting up in his chair, much more alert today  Neuro exam is improving    Temp Max 102.1/24 hrs, VSS  Pupils 3mm bilateral, reactive brisk  Alert, answering some questions appropriately  He is following commands in bilateral UE and LE  Dressing c/d/I  Flap full but not tense  Drain site dry    Plan: No new recs from our standpoint  OK to leave incision open to air  Continue Q4 hour neuro exams  Continue BP parameters below 150/90  Keppra BID  SCDs and lovenox for DVT prophylaxis  Remove every other staple on 2/16, remaining staples on 2/18  CM for placement

## 2025-02-14 LAB
ALBUMIN SERPL-MCNC: 2.3 G/DL (ref 3.4–4.8)
ALBUMIN/GLOB SERPL: 0.6 RATIO (ref 1.1–2)
ALLENS TEST BLOOD GAS (OHS): YES
ALP SERPL-CCNC: 86 UNIT/L (ref 40–150)
ALT SERPL-CCNC: 21 UNIT/L (ref 0–55)
ANION GAP SERPL CALC-SCNC: 9 MEQ/L
AST SERPL-CCNC: 30 UNIT/L (ref 5–34)
BASE EXCESS BLD CALC-SCNC: 3.3 MMOL/L (ref -2–2)
BASOPHILS # BLD AUTO: 0.05 X10(3)/MCL
BASOPHILS NFR BLD AUTO: 0.4 %
BILIRUB SERPL-MCNC: 0.4 MG/DL
BLOOD GAS SAMPLE TYPE (OHS): ABNORMAL
BUN SERPL-MCNC: 33.3 MG/DL (ref 8.4–25.7)
CA-I BLD-SCNC: 1.17 MMOL/L (ref 1.12–1.23)
CALCIUM SERPL-MCNC: 8.8 MG/DL (ref 8.8–10)
CHLORIDE SERPL-SCNC: 107 MMOL/L (ref 98–107)
CO2 BLDA-SCNC: 27.9 MMOL/L
CO2 SERPL-SCNC: 25 MMOL/L (ref 23–31)
COHGB MFR BLDA: 1.9 % (ref 0.5–1.5)
CREAT SERPL-MCNC: 0.76 MG/DL (ref 0.72–1.25)
CREAT/UREA NIT SERPL: 44
DRAWN BY BLOOD GAS (OHS): ABNORMAL
EOSINOPHIL # BLD AUTO: 0.14 X10(3)/MCL (ref 0–0.9)
EOSINOPHIL NFR BLD AUTO: 1 %
ERYTHROCYTE [DISTWIDTH] IN BLOOD BY AUTOMATED COUNT: 14.3 % (ref 11.5–17)
GFR SERPLBLD CREATININE-BSD FMLA CKD-EPI: >60 ML/MIN/1.73/M2
GLOBULIN SER-MCNC: 4.1 GM/DL (ref 2.4–3.5)
GLUCOSE SERPL-MCNC: 292 MG/DL (ref 82–115)
HCO3 BLDA-SCNC: 26.8 MMOL/L (ref 22–26)
HCT VFR BLD AUTO: 35.6 % (ref 42–52)
HGB BLD-MCNC: 11.3 G/DL (ref 14–18)
IMM GRANULOCYTES # BLD AUTO: 0.07 X10(3)/MCL (ref 0–0.04)
IMM GRANULOCYTES NFR BLD AUTO: 0.5 %
INHALED O2 CONCENTRATION: 30 %
LYMPHOCYTES # BLD AUTO: 1.99 X10(3)/MCL (ref 0.6–4.6)
LYMPHOCYTES NFR BLD AUTO: 14.4 %
MCH RBC QN AUTO: 28.6 PG (ref 27–31)
MCHC RBC AUTO-ENTMCNC: 31.7 G/DL (ref 33–36)
MCV RBC AUTO: 90.1 FL (ref 80–94)
MECH RR (OHS): 6 B/MIN
METHGB MFR BLDA: 1.1 % (ref 0.4–1.5)
MODE (OHS): ABNORMAL
MONOCYTES # BLD AUTO: 1.08 X10(3)/MCL (ref 0.1–1.3)
MONOCYTES NFR BLD AUTO: 7.8 %
NEUTROPHILS # BLD AUTO: 10.5 X10(3)/MCL (ref 2.1–9.2)
NEUTROPHILS NFR BLD AUTO: 75.9 %
NRBC BLD AUTO-RTO: 0 %
O2 HB BLOOD GAS (OHS): 95.8 % (ref 94–97)
OXYGEN DEVICE BLOOD GAS (OHS): ABNORMAL
OXYHGB MFR BLDA: 10.7 G/DL (ref 12–16)
PCO2 BLDA: 36 MMHG (ref 35–45)
PEEP RESPIRATORY: 5 CMH2O
PH BLDA: 7.48 [PH] (ref 7.35–7.45)
PLATELET # BLD AUTO: 348 X10(3)/MCL (ref 130–400)
PMV BLD AUTO: 11.6 FL (ref 7.4–10.4)
PO2 BLDA: 85 MMHG (ref 80–100)
POCT GLUCOSE: 187 MG/DL (ref 70–110)
POCT GLUCOSE: 252 MG/DL (ref 70–110)
POCT GLUCOSE: 267 MG/DL (ref 70–110)
POCT GLUCOSE: 288 MG/DL (ref 70–110)
POCT GLUCOSE: 297 MG/DL (ref 70–110)
POTASSIUM BLOOD GAS (OHS): 3.6 MMOL/L (ref 3.5–5)
POTASSIUM SERPL-SCNC: 3.6 MMOL/L (ref 3.5–5.1)
PROT SERPL-MCNC: 6.4 GM/DL (ref 5.8–7.6)
PS (OHS): 15 CMH2O
RBC # BLD AUTO: 3.95 X10(6)/MCL (ref 4.7–6.1)
SAMPLE SITE BLOOD GAS (OHS): ABNORMAL
SAO2 % BLDA: 97.1 %
SODIUM BLOOD GAS (OHS): 139 MMOL/L (ref 137–145)
SODIUM SERPL-SCNC: 141 MMOL/L (ref 136–145)
SPONT+MECH VT ON VENT: 500 ML
WBC # BLD AUTO: 13.83 X10(3)/MCL (ref 4.5–11.5)

## 2025-02-14 PROCEDURE — 80053 COMPREHEN METABOLIC PANEL: CPT

## 2025-02-14 PROCEDURE — 36600 WITHDRAWAL OF ARTERIAL BLOOD: CPT

## 2025-02-14 PROCEDURE — 97168 OT RE-EVAL EST PLAN CARE: CPT

## 2025-02-14 PROCEDURE — 85025 COMPLETE CBC W/AUTO DIFF WBC: CPT

## 2025-02-14 PROCEDURE — 25000003 PHARM REV CODE 250

## 2025-02-14 PROCEDURE — 99900026 HC AIRWAY MAINTENANCE (STAT)

## 2025-02-14 PROCEDURE — 36415 COLL VENOUS BLD VENIPUNCTURE: CPT

## 2025-02-14 PROCEDURE — 94760 N-INVAS EAR/PLS OXIMETRY 1: CPT

## 2025-02-14 PROCEDURE — 63600175 PHARM REV CODE 636 W HCPCS: Performed by: SURGERY

## 2025-02-14 PROCEDURE — 63600175 PHARM REV CODE 636 W HCPCS

## 2025-02-14 PROCEDURE — 27100171 HC OXYGEN HIGH FLOW UP TO 24 HOURS

## 2025-02-14 PROCEDURE — 25000003 PHARM REV CODE 250: Performed by: SURGERY

## 2025-02-14 PROCEDURE — 97164 PT RE-EVAL EST PLAN CARE: CPT

## 2025-02-14 PROCEDURE — 99900022

## 2025-02-14 PROCEDURE — 20800000 HC ICU TRAUMA

## 2025-02-14 PROCEDURE — 99291 CRITICAL CARE FIRST HOUR: CPT | Mod: ,,, | Performed by: SURGERY

## 2025-02-14 PROCEDURE — 94003 VENT MGMT INPAT SUBQ DAY: CPT

## 2025-02-14 PROCEDURE — 82803 BLOOD GASES ANY COMBINATION: CPT

## 2025-02-14 PROCEDURE — 99900035 HC TECH TIME PER 15 MIN (STAT)

## 2025-02-14 RX ADMIN — METOPROLOL TARTRATE 50 MG: 50 TABLET, FILM COATED ORAL at 08:02

## 2025-02-14 RX ADMIN — METHOCARBAMOL 500 MG: 500 TABLET ORAL at 05:02

## 2025-02-14 RX ADMIN — AMLODIPINE BESYLATE 10 MG: 5 TABLET ORAL at 08:02

## 2025-02-14 RX ADMIN — INSULIN ASPART 9 UNITS: 100 INJECTION, SOLUTION INTRAVENOUS; SUBCUTANEOUS at 04:02

## 2025-02-14 RX ADMIN — METHOCARBAMOL 500 MG: 500 TABLET ORAL at 09:02

## 2025-02-14 RX ADMIN — METHYLPHENIDATE HYDROCHLORIDE 20 MG: 5 TABLET ORAL at 04:02

## 2025-02-14 RX ADMIN — LOSARTAN POTASSIUM 100 MG: 50 TABLET, FILM COATED ORAL at 08:02

## 2025-02-14 RX ADMIN — CEFEPIME 1 G: 1 INJECTION, POWDER, FOR SOLUTION INTRAMUSCULAR; INTRAVENOUS at 07:02

## 2025-02-14 RX ADMIN — INSULIN ASPART 9 UNITS: 100 INJECTION, SOLUTION INTRAVENOUS; SUBCUTANEOUS at 11:02

## 2025-02-14 RX ADMIN — AMANTADINE HYDROCHLORIDE 100 MG: 50 SOLUTION ORAL at 08:02

## 2025-02-14 RX ADMIN — METHYLPHENIDATE HYDROCHLORIDE 20 MG: 5 TABLET ORAL at 08:02

## 2025-02-14 RX ADMIN — ENOXAPARIN SODIUM 40 MG: 40 INJECTION SUBCUTANEOUS at 08:02

## 2025-02-14 RX ADMIN — FOLIC ACID 1 MG: 1 TABLET ORAL at 08:02

## 2025-02-14 RX ADMIN — FAMOTIDINE 20 MG: 20 TABLET, FILM COATED ORAL at 08:02

## 2025-02-14 RX ADMIN — CEFEPIME 1 G: 1 INJECTION, POWDER, FOR SOLUTION INTRAMUSCULAR; INTRAVENOUS at 08:02

## 2025-02-14 RX ADMIN — THERA TABS 1 TABLET: TAB at 08:02

## 2025-02-14 RX ADMIN — INSULIN ASPART 10 UNITS: 100 INJECTION, SOLUTION INTRAVENOUS; SUBCUTANEOUS at 12:02

## 2025-02-14 RX ADMIN — THIAMINE HCL TAB 100 MG 100 MG: 100 TAB at 08:02

## 2025-02-14 RX ADMIN — ACETAMINOPHEN 650 MG: 650 SOLUTION ORAL at 04:02

## 2025-02-14 RX ADMIN — INSULIN ASPART 3 UNITS: 100 INJECTION, SOLUTION INTRAVENOUS; SUBCUTANEOUS at 04:02

## 2025-02-14 RX ADMIN — CEFEPIME 1 G: 1 INJECTION, POWDER, FOR SOLUTION INTRAMUSCULAR; INTRAVENOUS at 01:02

## 2025-02-14 RX ADMIN — INSULIN ASPART 9 UNITS: 100 INJECTION, SOLUTION INTRAVENOUS; SUBCUTANEOUS at 08:02

## 2025-02-14 RX ADMIN — INSULIN GLARGINE 40 UNITS: 100 INJECTION, SOLUTION SUBCUTANEOUS at 08:02

## 2025-02-14 NOTE — PROGRESS NOTES
Trauma ICU Progress Note    Patient Information:   Patient Name: Ezequiel Ramires                   : 1960     MRN: [id number]   Date of Admission: 2025  Code Status: Full Code  Date of Exam: 2025  HD#10  POD#10 Days Post-Op  Attending Provider: Maik Moreno Jr., *  Admission Summary:   Patient is a 64 year old  male with PMH of CAD, HTN, DM, HLD, alcoholism, arthritis, anxiety, MI who presents as a transfer from [facility] with bilateral SDH with 0.8 right to left midline shift, SAH, left occipital fracture, right 4th rib fracture. Report per wife patient was last seen normal around 1930 yesterday, she woke this morning to find him on the ground next to the bed with vomit on the floor, he was taken to the OSH at around 0500     Interval history:    NAEON.    Consults:   Consults: Neurosurgery, Cardiology Injuries:  1. Acute nondisplaced R 4th rib fracture  2. Tree-in-bud nodularities in the bilateral lower lobes which could be due to infectious bronchiolitis or aspiration   3. 4 mm anterolisthesis of C4 on C5 due to degeneration versus trauma  4. Nondisplaced left occipital bone fracture extending inferiorly to the level of the foramen magnum   5. Bilateral SDH  6. Bilateral IPH  7. Bilateral SAH  8. Small layering hemorrhage in the left occipital horn   9. 8 mm right to left midline shift    [x]Problems list reviewed  [x]Tertiary exam performed Operations/Procedures:  Crani 25  Percutaneous tracheostomy   PEG      Past medical history:  HTN  HLD  DMII  Psoriasis  CAD s/p stents  MI    Medications: [x] Medications reviewed/updated   Home Meds:    Current Outpatient Medications   Medication Instructions    amLODIPine (NORVASC) 5 MG tablet 1 tablet, Daily    aspirin (ECOTRIN) 81 MG EC tablet 1 tablet, Oral, Every morning    ezetimibe (ZETIA) 10 mg tablet 1 tablet, Daily    icosapent ethyL (VASCEPA) 2,000 mg, Nightly    losartan (COZAAR) 100 MG tablet 1 tablet, Nightly    metoprolol tartrate  (LOPRESSOR) 50 MG tablet 1 tablet, 2 times daily    prasugreL HCl (EFFIENT) 10 mg, Daily    rosuvastatin (CRESTOR) 10 MG tablet 1 tablet, Nightly    semaglutide (OZEMPIC SUBQ) Subcutaneous      Scheduled Meds:    amantadine HCL  100 mg Per G Tube BID    amLODIPine  10 mg Per G Tube Daily    ceFEPime IV (PEDS and ADULTS)  1 g Intravenous Q6H    enoxparin  40 mg Subcutaneous Q12H (prophylaxis, 0900/2100)    famotidine  20 mg Per G Tube BID    folic acid  1 mg Per G Tube Daily    insulin glargine U-100  40 Units Subcutaneous BID    losartan  100 mg Per G Tube QHS    methocarbamoL  500 mg Per G Tube Q8H    methylphenidate HCl  20 mg Per G Tube BID WM    metoprolol tartrate  50 mg Per G Tube BID    multivitamin  1 tablet Per G Tube Daily    thiamine  100 mg Per G Tube Daily     Continuous Infusions:    D10W   Intravenous Continuous PRN         PRN Meds:   Current Facility-Administered Medications:     acetaminophen, 650 mg, Per G Tube, Q4H PRN    acetaminophen, 650 mg, Rectal, Q4H PRN    bisacodyL, 10 mg, Rectal, Daily PRN    ceFAZolin (Ancef) IV (PEDS and ADULTS), 2 g, Intravenous, On Call Procedure    D10W, , Intravenous, Continuous PRN    dextrose 50%, 12.5 g, Intravenous, PRN    dextrose 50%, 25 g, Intravenous, PRN    enalaprilat, 1.25 mg, Intravenous, Q4H PRN    fentaNYL, 50 mcg, Intravenous, Q2H PRN    glucagon (human recombinant), 1 mg, Intramuscular, PRN    glucagon (human recombinant), 1 mg, Intramuscular, PRN    hydrALAZINE, 10 mg, Intravenous, Q4H PRN    insulin aspart U-100, 0-15 Units, Subcutaneous, Q6H PRN    labetaloL, 20 mg, Intravenous, Q4H PRN    LORazepam, 2 mg, Per G Tube, Q4H PRN    ondansetron, 4 mg, Intravenous, Q6H PRN    oxyCODONE, 5 mg, Per G Tube, Q4H PRN    prochlorperazine, 5 mg, Intravenous, Q6H PRN     Vitals:  VITAL SIGNS: 24 HR MIN & MAX LAST   Temp  Min: 100.1 °F (37.8 °C)  Max: 101.6 °F (38.7 °C)  (!) 101.1 °F (38.4 °C)   BP  Min: 89/53  Max: 156/92  123/69    Pulse  Min: 81  Max: 114   "91    Resp  Min: 17  Max: 33  18    SpO2  Min: 90 %  Max: 99 %  99 %      HT: 6' 0.01" (182.9 cm)  WT: 80 kg (176 lb 5.9 oz)  BMI: 23.9   Ideal Body Weight (IBW), Male: 178.06 lb  % Ideal Body Weight, Male (lb): 99.08 %        General  Exam: intubated      Neuro/Psych  GCS: 11T (E 4) (V T) (M 6)  Exam: Patient is following commands moving his toes for me and opening his eyes to command. Crani incision c/d/I Corbin drain +SS drainage  ICP monitor: No  ICP treatment: No  C-Collar: No    Plan:   Bilateral SDH s/p crani-  Keppra x 7 days, HOB > 30 degrees. BP<140.   Continue amantadine, ritalin   Stop Keppra   CTM     HEENT  Exam: NCAT. Trach collar in place.     Plan:   monitoring     Pulmonary  Vitals: Resp  Av.4  Min: 17  Max: 33  SpO2  Av.9 %  Min: 90 %  Max: 99 %    Ventilator/Oxygen Settings:   Vent Mode: SIMV  Vt Set: 500 mL  Set Rate: 6 BPM  Pressure Support: 15 cmH20  I:E Ratio Measured: 1:1.7  Total PEEP: 5 cmH20 Vent Mode: SIMV (25)  Ventilator Initiated: Yes (25)  Set Rate: 6 BPM (25)  Vt Set: 500 mL (25)  Pressure Support: 15 cmH20 (25)  PEEP/CPAP: 5 cmH20 (25)  Oxygen Concentration (%): 30 (25)  Peak Airway Pressure: 21 cmH20 (25)  Total Ve: 15.6 L/m (25)  F/VT Ratio<105 (RSBI): (!) 63.52 (25)        ABG:   Recent Labs   Lab 25   PH 7.480*   PO2 85.0   PCO2 36.0   HCO3 26.8*        CXR:    X-Ray Chest 1 View for Line/Tube Placement    Result Date: 2025  PICC line insertion. Confluent opacities in the left retrocardiac region with silhouetting of the left hemidiaphragm which might be related to an infiltrate/atelectasis. No other change Electronically signed by: Serg Mera Date:    2025 Time:    11:33        Rib fractures: right rib fracture  Chest Tube: None     Exam: Coarse BS bilaterally on CPAP    Plan:     Trach collar today   CTM  Incentive Spirometry/RT " "Treatments: none     Cardiovascular  Vitals: Pulse  Av  Min: 81  Max: 114  BP  Min: 89/53  Max: 156/92  No results for input(s): "TROPONINI", "CKTOTAL", "CKMB", "BNP" in the last 168 hours.  Vasoactive Agents: None  Exam: RRR    Plan:   HTN- continue home meds and prns     Renal  Recent Labs     25  0340 25  03525  0343   BUN 32.4* 29.0* 33.3*   CREATININE 0.74 0.73 0.76       No results for input(s): "LACTIC" in the last 72 hours.    Intake/Output - Last 3 Shifts          07 06 07 0659 02/14 0700  02/15 0659    NG/GT 2375 1894     IV Piggyback       Total Intake(mL/kg) 2375 (29.7) 1894 (23.7)     Urine (mL/kg/hr) 2600 (1.4) 1650 (0.9)     Stool 100 300     Total Output 2700 1950     Net -325 -56            Stool Occurrence 2 x 2 x              Intake/Output Summary (Last 24 hours) at 2025 1031  Last data filed at 2025 0600  Gross per 24 hour   Intake 1894 ml   Output 1950 ml   Net -56 ml         Garcia: no    Plan:   monitor     FEN/GI  Recent Labs     25  0343    140 141   K 3.7 3.7 3.6    109* 107   CO2 26 25 25   CALCIUM 8.4* 8.6* 8.8   ALBUMIN 2.5* 2.3* 2.3*   BILITOT 0.5 0.4 0.4   AST 20 17 30   ALKPHOS 69 75 86   ALT 23 22 21       Diet: NPO. Resume tube feeds s/p PEG.     Last BM: +BM    Abdominal Exam: s/NT/ND +BS. PEG tube in place.     Plan:   Tube feeds through PEG  Bowel regimen     Heme/Onc  Recent Labs     25  0340 25  03525  0343   HGB 10.6* 11.0* 11.3*   HCT 33.1* 34.2* 35.6*    286 348       Transfusions (over past 24h): None    Plan:   monitor     ID  Temp  Av.1 °F (38.4 °C)  Min: 100.1 °F (37.8 °C)  Max: 101.6 °F (38.7 °C)      Recent Labs     25  0340 25  0355 25  0343   WBC 7.63 11.20 13.83*       Cultures: Antibiotics:    BCX x2 () in process  RCX () Enterobacter 1. Cefepime       Plan:   VAP- Cefepime to completion Saturday " 2/15     Endocrine  Recent Labs     02/12/25  0340 02/13/25  0355 02/14/25  0343   GLUCOSE 319* 307* 292*      Recent Labs     02/13/25  0401 02/13/25  0812 02/13/25  1231 02/13/25  1642 02/13/25  2021 02/13/25  2354 02/14/25  0358 02/14/25  0825   POCTGLUCOSE 303* 285* 236* 230* 275* 321* 297* 267*        Plan:   DMII-changed to high sliding scale . Increase Lantus 40 BID.  Insulin treatment: moderate SS. 30 Lantus BID     Musculoskeletal  Weight bearing status:   RUE: WBAT  LUE: WBAT  RLE: WBAT  LLE: WBAT    Exam: does not have purposeful movement  Plan:   monitor     Wounds  Wounds exam: crani incision c/d/I RORO drain in place  Wound vac: No   Media: none  Plan: local wound care     Precautions  Precautions: Seizure     Prophylaxis  Seizure: Keppra completed  DVT: Lovenox  GI: H2B     Lines/drains/airway   Lines/Drains/Airways       Drain  Duration             Male External Urinary Catheter 02/06/25 2300 Small 7 days         Gastrostomy/Enterostomy 02/11/25 1017 LUQ 3 days         Rectal Tube 02/11/25 1806 fecal management system 2 days              Airway  Duration             Adult Surgical Airway 02/11/25 0958 Shiley Cuffed 8.0 / 85 mm 3 days              Peripheral Intravenous Line  Duration                  Peripheral IV - Single Lumen 02/04/25 1730 20 G;18 G Right Antecubital 9 days         Peripheral IV - Single Lumen 02/08/25 1600 18 G Anterior;Right Upper Arm 5 days                    Plan:  Cont all lines    [x]LDA reviewed/updated      Restraints  Face to face evaluation of need for restraints on rounds today:   Currently restrained? No.        Assessment & Disposition:   Problem list:  Active Problem List with Overview Notes    Diagnosis Date Noted    Acute hypoxemic respiratory failure 02/12/2025    Dysphagia 02/12/2025    VAP (ventilator-associated pneumonia) 02/12/2025    Intraparenchymal hemorrhage of brain 02/05/2025    IVH (intraventricular hemorrhage) 02/05/2025    SDH (subdural hematoma)  02/04/2025    SAH (subarachnoid hemorrhage) 02/04/2025    Closed fracture of one rib of right side 02/04/2025    Closed fracture of left side of occipital bone 02/04/2025       Guarded. Poor prognosis.  Bilateral SDH s/p crani- Keppra x 7 days (stop 2/11), HOB > 30 degrees. BP<140. Continue amantadine, ritalin.  Wean to trach collar today   HTN-  continue home meds and PRNs  VAP- Cefepime to Saturday 2/15  Lovenox  PT/OT - high intensity therapy  DMII-increased to high sliding scale; latus 40 BID  Approved to Tier waiting on insurance        Critical Care Time:   41 minutes of critical care was spent on this patient personally by me on the following activities: development of treatment plan with patient and bedside nurse, discussions with consultants, evaluation of patient's response to treatment, examining the patient, ordering and preforming treatments and interventions, ordering and reviewing laboratory studies, ordering and reviewing radiologic studies, and re-evaluation of patient's condition.     Daisy Moreno Jr., MD, MS  Trauma Critical Care Surgery  [facility]   02/14/2025

## 2025-02-14 NOTE — PLAN OF CARE
Lisa Saravia at [facility], they are waiting on insurer. Pt is ready medically for transfer.  Rani has weekend  name and phone if needed and communication sent to Catarina with [facility] contact info  Pts packet has been started and disc included   If accepted nursing knows to contact  who will have received the info from [facility] regarding accepting dr , report etc. Ambulance [phone] will need to be set up

## 2025-02-14 NOTE — PT/OT/SLP RE-EVAL
"Occupational Therapy   Re-evaluation    Name: Ezequiel Ramires  MRN: [id number]    Recommendations:     Discharge therapy intensity: High Intensity Therapy   Discharge Equipment Recommendations:  to be determined by next level of care  Barriers to discharge:   (ongoing medical needs, severity of deficits)    Assessment:     Ezequiel Ramires is a 64 y.o. male with a medical diagnosis of B SDH s/p R craniectomy, SAH, IPH, L occipital bone fx, R 4th rib fx. Patient developed ST with bigeminy; is now s/p trach and PEG placement. He presents with the following performance deficits affecting function: weakness, impaired endurance, impaired self care skills, impaired sensation, impaired functional mobility, impaired balance, visual deficits, decreased upper extremity function, decreased lower extremity function, decreased coordination, decreased safety awareness, decreased ROM. Patient progressing well towards OT goals. Patient followed 100% of simple one step commands. Patient communicated "goodmorning", his name, and location. Patient performed all OT assessments seated EOB prior to transferring bed > chair. While EOB, patient tracking L&R, wiggling B toes (L>R), kicking BLEs, and movement noted in BUEs; 2-/5 digit flexion, 1/5 elbow flexion, 2-/5 elbow extension. Patient remained on trach collar throughout session with no S/S of distress and vitals WNL. Great candidate for high intensity therapy in a neuro rehab setting.     Rehab Prognosis: Good; patient would benefit from acute skilled OT services to address these deficits and reach maximum level of function.       Plan:     Patient to be seen 5 x/week to address the above listed problems via self-care/home management, therapeutic activities, therapeutic exercises, neuromuscular re-education  Plan of Care Expires: 03/07/25  Plan of Care Reviewed with: patient, spouse    Subjective     Chief Complaint: none stated  Patient/Family Comments/goals: to get better    Pain/Comfort:  Pain " Rating 1: 0/10    Patients cultural, spiritual, Cheondoism conflicts given the current situation: no    Objective:     OT communicated with NSG prior to session.      Patient was found HOB elevated with blood pressure cuff, arterial line, PureWick, peripheral IV, pulse ox (continuous), telemetry, Tracheostomy, ventilator, PEG Tube (rectal tube) upon OT entry to room.    General Precautions: Standard, fall (SBP < 140, R bone flap pxs)  Orthopedic Precautions: N/A  Braces:  (helmet)  Trach collar   Vital Signs: 117/72 98% 89 bpm; 92% with mobility    Bed Mobility:    Patient completed Supine to Sit with maximal assistance and 2 persons  Patient completed Sit to Supine with maximal assistance and 2 persons  poor static sitting at EOB however with increased trunk and neck flexion; able to improve upright posture with cuing    Functional Mobility/Transfers:  Patient completed Sit <> Stand Transfer with maximal assistance and of 2 persons  with  hand-held assist   Patient completed Bed <> Chair Transfer using Squat Pivot technique with maximal assistance and of 2 persons with hand-held assist    Activities of Daily Living:  Grooming: total assistance to bring suction to mouth    AMPAC 6 Click ADL:  AMPAC Total Score: 12    Functional Cognition:  Orientation: oriented to Person and Place  Command Following: Follows simple one-step commands with 100% accuracy    Visual Perceptual Skills:  Pursuits: WFL, L&R    Upper Extremity Function:  Range of Motion/Manual Muscle Test    RUE  ROM Limitations  5 4+ 4 4- 3+ 3 3- 2+ 2 2- 1 0   Scapular Elevation  []   []   []   []   []  []  []  []  []  []  []  []    Scapular Retraction  [] [] [] [] [] [] [] [] [] [] [] []   Shoulder Flexion  []   []   []   []   []  []  []  []  []  []  []  []    Shoulder Abduction  []   []   []   []   []  []  []  []  []  []  []  []    Shoulder Extension  []   []   []   []   []  []  []  []  []  []  []  []    Shoulder External Rotation  []   []   []   []   []   []  []  []  []  []  []  []    Shoulder Internal Rotation  []   []   []   []   []  []  []  []  []  []  []  []    Elbow Flexion  []   []   []   []   []  []  []  []  []  []  [x]  []    Elbow Extension  []   []   []   []   []  []  []  []  []  [x]  []  []    Forearm Pronation  []   []   []   []   []  []  []  []  []  []  []  []    Forearm Supination  []   []   []   []   []  []  []  []  []  []  []  []    Wrist Flexion  []   []   []   []   []  []  []  []  []  []  []  []    Wrist Extension  []   []   []   []   []  []  []  []  []  []  []  []    Finger Flexion  []   []   []   []   []  []  []  []  []  [x]  []  []    Finger Extension  [] [] [] [] [] [] [] [] [] [] [x] []   Thumb Opposition  [] [] [] [] [] [] [] [] [] [] [] []     LUE  ROM Limitations 5 4+ 4 4- 3+ 3 3- 2+ 2 2- 1 0   Scapular Elevation  []   []   []   []   []  []  []  []  []  []  []  []    Scapular Retraction  [] [] [] [] [] [] [] [] [] [] [] []   Shoulder Flexion  []   []   []   []   []  []  []  []  []  []  []  []    Shoulder Abduction  []   []   []   []   []  []  []  []  []  []  []  []    Shoulder Extension  []   []   []   []   []  []  []  []  []  []  []  []    Shoulder External Rotation  []   []   []   []   []  []  []  []  []  []  []  []    Shoulder Internal Rotation  []   []   []   []   []  []  []  []  []  []  []  []    Elbow Flexion  []   []   []   []   []  []  []  []  []  []  [x]  []    Elbow Extension  []   []   []   []   []  []  []  []  []  [x]  []  []    Forearm Pronation  []   []   []   []   []  []  []  []  []  []  []  []    Forearm Supination  []   []   []   []   []  []  []  []  []  []  []  []    Wrist Flexion  []   []   []   []   []  []  []  []  []  []  []  []    Wrist Extension  []   []   []   []   []  []  []  []  []  []  []  []    Finger Flexion  []   []   []   []   []  []  []  []  []  [x]  []  []    Finger Extension  [] [] [] [] [] [] [] [] [] [] [x] []   Thumb Opposition  [] [] [] [] [] [] [] [] [] [] [] []       Therapeutic Positioning  Risk for  acquired pressure injuries is increased due to relative decrease in mobility d/t hospitalization  and impaired mobility.    OT interventions performed during the course of today's session in an effort to prevent and/or reduce acquired pressure injuries:   Education was provided on benefits of and recommendations for therapeutic positioning  Therapeutic positioning was provided at the conclusion of session to offload all bony prominences for the prevention and/or reduction of pressure injuries    Skin assessment: all bony prominences were assessed    Findings:  slight redness on bottom    OT recommendations for therapeutic positioning throughout hospitalization:   Follow [facility] Pressure Injury Prevention Protocol  Geomat recommended for sacral protection while [facility]  Specialty [employer] - watch for needs    Patient Education:  Patient and spouse were provided with verbal education education regarding OT role/goals/POC, fall prevention, safety awareness, Discharge/DME recommendations, and pressure ulcer prevention.  Understanding was verbalized.     Patient left up in chair with all lines intact, call button in reach, NSG notified, spouse present, and posey vest    GOALS:   Multidisciplinary Problems       Occupational Therapy Goals          Problem: Occupational Therapy    Goal Priority Disciplines Outcome Interventions   Occupational Therapy Goal     OT, PT/OT Progressing    Description: LTG: Pt will perform basic ADLs and ADL t/fs with min A using LRAD by d/c.    STG: to be met by 3/7/25  1. Pt will follow 100% of simple one step commands across 3 sessions  2. Pt will perform grooming EOB with min A.   3. Pt will perform functional grasp/reach/release of items >80% of trials in prep for increased participation in ADL tasks.   4. Pt will perform sit<>stand with mod A in prep for ADL t/fs.  5. Pt will perform bedside commode transfer with mod A.                         History:     Past Medical History:   Diagnosis Date     CAD (coronary artery disease)     DMII (diabetes mellitus, type 2)     HLD (hyperlipidemia)     HTN (hypertension)     Psoriasis     ST elevation (STEMI) myocardial infarction of unspecified site          Past Surgical History:   Procedure Laterality Date    CRANIOTOMY FOR EVACUATION OF SUBDURAL HEMATOMA Right 2/4/2025    Procedure: CRANIOTOMY, FOR SUBDURAL HEMATOMA EVACUATION;  Surgeon: Samir Garcia MD;  Location: [facility];  Service: Neurosurgery;  Laterality: Right;       Time Tracking:     OT Date of Treatment:    OT Start Time: 0930  OT Stop Time: 0951  OT Total Time (min): 21 min    Billable Minutes:Re-eval 1    2/14/2025

## 2025-02-14 NOTE — PT/OT/SLP EVAL
Physical Therapy Re-Evaluation    Patient Name:  Ezequiel Ramires   MRN:  [id number]    Recommendations:     Discharge therapy intensity: High Intensity Therapy   Discharge Equipment Recommendations:  (TBD)   Barriers to discharge: Inaccessible home, Decreased caregiver support, Impaired mobility, and Ongoing medical needs    Assessment:     Ezequiel Ramires is a 64 y.o. male admitted with a medical diagnosis of B SDH s/p R craniectomy, SAH, IPH, L occipital bone fx, R 4th rib fx. Patient developed ST with bigeminy; is now s/p trach and PEG placement.  He presents with the following impairments/functional limitations: weakness, impaired self care skills, impaired functional mobility, decreased lower extremity function, impaired endurance, impaired balance.     Pt tolerated session well, significant improvements in functional status and alertness since initial evaluation. Pt oriented to person and place but not time or situation. Pt able to recognize spouse and mouth her name. Pt able to follow 100% of commands supine and sitting; however, increased drowsiness after ~8 minutes. Pt able to give thumbs up/down and participate in MMT of UE/LE. Pt continues to require MaxA x 2 for sup -> sit and bed to chair transfer. R lateral lean in chair requiring pillow support for trunk control. Remains appropriate for high intensity therapy in a  neuro setting at d/c.     Rehab Prognosis: Good; patient would benefit from acute skilled PT services to address these deficits and reach maximum level of function.    Recent Surgery: Procedure(s) (LRB):  CRANIOTOMY, FOR SUBDURAL HEMATOMA EVACUATION (Right) 10 Days Post-Op    Plan:     During this hospitalization, patient would benefit from acute PT services 6 x/week to address the identified rehab impairments via therapeutic activities, therapeutic exercises and progress toward the following goals:    Plan of Care Expires:  03/08/25    PT/PTA conference to discuss PT POC and patient's progression  towards goals held with Jb Schwarz PTA.     Subjective     Chief Complaint: none verbalized  Patient/Family Comments/goals: to return to PLOF  Pain/Comfort:  Pain Rating 1:  (none noted)    Patients cultural, spiritual, Jewish conflicts given the current situation: no    Objective:     Communicated with RN prior to session.  Patient found HOB elevated with blood pressure cuff, telemetry, PureWick, bowel management system, peripheral IV, oxygen, Tracheostomy, PEG Tube, PRAFO, pulse ox (continuous), SCD  upon PT entry to room.    General Precautions: Standard, fall (SBP < 140, R bone flap pxs)  Orthopedic Precautions:N/A   Braces:  (helmet)  Respiratory Status:  trach collar 8L 92% with mobility   Blood Pressure: 134/80      Exams:  RLE Strength: 1 hip flexion, 3- knee extension, 3- DF   LLE Strength: 1 hip flexion, 3- knee extension, 3- DF   Skin integrity:  areas of known impairment s/p craniectomy      Functional Mobility:  Bed Mobility:     Supine to Sit: maximal assistance and of 2 persons  Transfers:     Sit to Stand:  maximal assistance and of 2 persons with hand-held assist  Bed to Chair: maximal assistance and of 2 persons with  hand-held assist  using  Squat Pivot  Balance: poor static sitting at EOB with increased trunk and neck flexion, able to improve upright posture with cuing.       AM-PAC 6 CLICK MOBILITY  Total Score:10       Treatment & Education:    Patient and spouse were provided with verbal education education regarding PT role/goals/POC, post-op precautions, fall prevention, and safety awareness.  Additional teaching is warranted.     Patient left up in chair with all lines intact, call button in reach, patience pad in place, spouse present, and posey vest applied .    GOALS:   Multidisciplinary Problems       Physical Therapy Goals          Problem: Physical Therapy    Goal Priority Disciplines Outcome Interventions   Physical Therapy Goal     PT, PT/OT Progressing    Description: Goals to  be met by: d/c     Patient will increase functional independence with mobility by performin. Supine to sit with MInimal Assistance  2. Sit to supine with MInimal Assistance  3. Sit to stand transfer with Minimal Assistance  4. Bed to chair transfer with Minimal Assistance using LRAD vs pivot  5. 10' with B HHA MaxA                          History:     Past Medical History:   Diagnosis Date    CAD (coronary artery disease)     DMII (diabetes mellitus, type 2)     HLD (hyperlipidemia)     HTN (hypertension)     Psoriasis     ST elevation (STEMI) myocardial infarction of unspecified site        Past Surgical History:   Procedure Laterality Date    CRANIOTOMY FOR EVACUATION OF SUBDURAL HEMATOMA Right 2025    Procedure: CRANIOTOMY, FOR SUBDURAL HEMATOMA EVACUATION;  Surgeon: Samir Garcia MD;  Location: [facility];  Service: Neurosurgery;  Laterality: Right;       Time Tracking:     PT Received On: 25  PT Start Time: 930     PT Stop Time: 951  PT Total Time (min): 21 min     Billable Minutes: Re-eval 2025

## 2025-02-14 NOTE — PROGRESS NOTES
Inpatient Nutrition Assessment    Admit Date: 2/4/2025   Total duration of encounter: 10 days   Patient Age: 64 y.o.    Nutrition Recommendation/Prescription     Tube feeding recommendation:     Impact Peptide 1.5 goal rate 70 ml/hr to provide  2100 kcal/d  (97% est needs)  131 g protein/d (100% est needs)  1078 ml free water/d (50% est needs)  240gm CHO/day  (calculations based on estimated 20 hr/d run time)     If no IV fluids running, can give 100ml q 2hr water flushes. Total water provided: 2078ml (119% est needs.)     Start @ 20ml/hr, increase as tolerated 20ml/hr q4hr until goal rate reached.      Continue to medically manage CBGs per MD.     Communication of Recommendations: reviewed with nurse    Nutrition Assessment     Malnutrition Assessment/Nutrition-Focused Physical Exam       Malnutrition Level: other (see comments) (Does not meet criteria) (02/05/25 0931)  Energy Intake (Malnutrition): other (see comments) (Unable to assess) (02/05/25 0931)  Weight Loss (Malnutrition): other (see comments) (Unable to assess) (02/05/25 0931)                                         Fluid Accumulation (Malnutrition): other (see comments) (Not present) (02/05/25 0931)        A minimum of two characteristics is recommended for diagnosis of either severe or non-severe malnutrition.    Chart Review    Reason Seen: continuous nutrition monitoring and physician consult for Tf    Malnutrition Screening Tool Results   Have you recently lost weight without trying?: No  Have you been eating poorly because of a decreased appetite?: No   MST Score: 0   Diagnosis:  SDH, SAH, IPH  Left occipital bone fracture  Right 4th rib fracture    Relevant Medical History:   CAD, DM, HTN, HLD, alcoholism, anxiety, arthritis, MI     Scheduled Medications:  amantadine HCL, 100 mg, BID  amLODIPine, 10 mg, Daily  ceFEPime IV (PEDS and ADULTS), 1 g, Q6H  enoxparin, 40 mg, Q12H (prophylaxis, 0900/2100)  famotidine, 20 mg, BID  folic acid, 1 mg,  Daily  insulin glargine U-100, 40 Units, BID  losartan, 100 mg, QHS  methocarbamoL, 500 mg, Q8H  methylphenidate HCl, 20 mg, BID WM  metoprolol tartrate, 50 mg, BID  multivitamin, 1 tablet, Daily  thiamine, 100 mg, Daily    Continuous Infusions:  D10W    PRN Medications:  acetaminophen, 650 mg, Q4H PRN  acetaminophen, 650 mg, Q4H PRN  bisacodyL, 10 mg, Daily PRN  ceFAZolin (Ancef) IV (PEDS and ADULTS), 2 g, On Call Procedure  D10W, , Continuous PRN  dextrose 50%, 12.5 g, PRN  dextrose 50%, 25 g, PRN  enalaprilat, 1.25 mg, Q4H PRN  fentaNYL, 50 mcg, Q2H PRN  glucagon (human recombinant), 1 mg, PRN  glucagon (human recombinant), 1 mg, PRN  hydrALAZINE, 10 mg, Q4H PRN  insulin aspart U-100, 0-15 Units, Q6H PRN  labetaloL, 20 mg, Q4H PRN  LORazepam, 2 mg, Q4H PRN  ondansetron, 4 mg, Q6H PRN  oxyCODONE, 5 mg, Q4H PRN  prochlorperazine, 5 mg, Q6H PRN    Calorie Containing IV Medications: no significant kcals from medications at this time    Recent Labs   Lab 02/08/25  0250 02/08/25  0701 02/09/25  0422 02/09/25  1245 02/10/25  0307 02/10/25  0650 02/10/25  1043 02/10/25  1851 02/11/25  0336 02/12/25  0340 02/13/25  0355 02/14/25  0343   *   < > 149*   < > 144 144 144 141 143 143 140 141   K 3.3*  --  3.7  --  4.0  --   --   --  4.2 3.7 3.7 3.6   CALCIUM 8.2*  --  8.0*  --  8.6*  --   --   --  7.9* 8.4* 8.6* 8.8   *  --  115*  --  110*  --   --   --  109* 107 109* 107   CO2 23  --  25  --  27  --   --   --  25 26 25 25   BUN 19.4  --  19.3  --  24.7  --   --   --  29.5* 32.4* 29.0* 33.3*   CREATININE 0.71*  --  0.77  --  0.80  --   --   --  0.79 0.74 0.73 0.76   EGFRNORACEVR >60  --  >60  --  >60  --   --   --  >60 >60 >60 >60   GLUCOSE 163*  --  332*  --  305*  --   --   --  297* 319* 307* 292*   BILITOT 0.3  --  0.4  --  0.6  --   --   --  0.6 0.5 0.4 0.4   ALKPHOS 64  --  68  --  73  --   --   --  56 69 75 86   ALT 38  --  40  --  33  --   --   --  25 23 22 21   AST 38*  --  31  --  18  --   --   --  16 20  "17 30   ALBUMIN 2.5*  --  2.5*  --  2.8*  --   --   --  2.5* 2.5* 2.3* 2.3*   PREALB  --   --   --   --  16.7  --   --   --   --   --  14.2*  --    CRP  --   --   --   --  40.80*  --   --   --   --   --  121.90*  --    WBC 7.81  --  6.76  --  8.81  --   --   --  7.54 7.63 11.20 13.83*   HGB 10.1*  --  10.2*  --  11.5*  --   --   --  10.9* 10.6* 11.0* 11.3*   HCT 32.5*  --  32.2*  --  36.3*  --   --   --  33.9* 33.1* 34.2* 35.6*    < > = values in this interval not displayed.     Nutrition Orders:  Diet NPO  Tube Feedings/Formulas 70; 1,400; Impact Peptide 1.5; OG; 100; Every 2 hours    Appetite/Oral Intake: not applicable/not applicable  Factors Affecting Nutritional Intake: on mechanical ventilation  Social Needs Impacting Access to Food: unable to assess at this time; will attempt on follow-up  Food/Restorationism/Cultural Preferences: unable to obtain  Food Allergies: no known food allergies  Last Bowel Movement: 02/14/25  Wound(s):  Incision documentation noted     Comments    2/5/25: Discussed with RN. Will provide tube feeding recommendations for when appropriate to start tube feeding. Receiving kcal from meds.  Noted elevated Tmax, may need to update est needs upon F/U.     2/7/25: TF continues, tolerated per RN. Noted Na and Cl elevated. Purposefully elevated. Being managed by neurosurgery.    2/11/25: TF continues. Currently on hold for PEG and trach today. Noted lantus orders. Noted Ozempic listed in pt home meds.    2/14/25: TF continues, tolerated per RN. Noted elevated CBGs. Pt on same amount of CHO TF formula as DM formula. Discussed with MD. Plans for continuing formula and adding insulin as needed.     Anthropometrics    Height: 6' 0.01" (182.9 cm), Height Method: Estimated  Last Weight: 80 kg (176 lb 5.9 oz) (02/05/25 0400), Weight Method: Bed Scale  BMI (Calculated): 23.9  BMI Classification: normal (BMI 18.5-24.9)        Ideal Body Weight (IBW), Male: 178.06 lb     % Ideal Body Weight, Male (lb): 99.08 " %                          Usual Weight Provided By: unable to obtain usual weight    Wt Readings from Last 5 Encounters:   02/05/25 80 kg (176 lb 5.9 oz)     Weight Change(s) Since Admission:   Wt Readings from Last 1 Encounters:   02/05/25 0400 80 kg (176 lb 5.9 oz)   02/04/25 1234 80 kg (176 lb 5.9 oz)   Admit Weight: 80 kg (176 lb 5.9 oz) (02/04/25 1234), Weight Method: Estimated    Estimated Needs    Weight Used For Calorie Calculations: 80 kg (176 lb 5.9 oz)  Energy Calorie Requirements (kcal): 2158kcal  Energy Need Method: [facility]  Weight Used For Protein Calculations: 80 kg (176 lb 5.9 oz)  Protein Requirements: 120-136gm (1.5-1.7g/kg)  Fluid Requirements (mL): 2158ml (1ml/kcal)  CHO Requirement: 245gm (45% est kcal needs)     Enteral Nutrition     Formula: Impact Peptide 1.5 Gus  Rate/Volume: 70ml/hr  Water Flushes: 100ml q2hr  Additives/Modulars: none at this time  Route: orogastric tube  Method: continuous  Total Nutrition Provided by Tube Feeding, Additives, and Flushes:  Calories Provided  2100 kcal/d, 97% needs   Protein Provided  131 g/d, 100% needs   Fluid Provided  2078 ml/d, 96% needs   Continuous feeding calculations based on estimated 20 hr/d run time unless otherwise stated.    Parenteral Nutrition     Patient not receiving parenteral nutrition support at this time.    Evaluation of Received Nutrient Intake    Calories: meeting estimated needs  Protein: meeting estimated needs    Patient Education     Not applicable.    Nutrition Diagnosis     PES: Inadequate oral intake related to acute illness as evidenced by intubation since admit. (active)     PES:            Nutrition Interventions     Intervention(s): collaboration with other providers  Intervention(s):      Goal: Meet greater than 80% of nutritional needs by follow-up. (goal progressing)  Goal: Tolerate enteral feeding at goal rate by follow-up. (goal progressing)    Nutrition Goals & Monitoring     Dietitian will monitor: energy  intake and enteral nutrition intake  Discharge planning: too early to determine; pending clinical course  Nutrition Risk/Follow-Up: high (follow-up in 1-4 days)   Please consult if re-assessment needed sooner.

## 2025-02-14 NOTE — PLAN OF CARE
Problem: Physical Therapy  Goal: Physical Therapy Goal  Description: Goals to be met by: d/c     Patient will increase functional independence with mobility by performin. Supine to sit with MInimal Assistance  2. Sit to supine with MInimal Assistance  3. Sit to stand transfer with Minimal Assistance  4. Bed to chair transfer with Minimal Assistance using LRAD vs pivot  5. 10' with B HHA MaxA     Outcome: Progressing

## 2025-02-15 LAB
ALBUMIN SERPL-MCNC: 2.3 G/DL (ref 3.4–4.8)
ALBUMIN/GLOB SERPL: 0.7 RATIO (ref 1.1–2)
ALLENS TEST BLOOD GAS (OHS): YES
ALP SERPL-CCNC: 73 UNIT/L (ref 40–150)
ALT SERPL-CCNC: 23 UNIT/L (ref 0–55)
ANION GAP SERPL CALC-SCNC: 10 MEQ/L
AST SERPL-CCNC: 31 UNIT/L (ref 5–34)
BACTERIA BLD CULT: NORMAL
BACTERIA BLD CULT: NORMAL
BASE EXCESS BLD CALC-SCNC: 4 MMOL/L (ref -2–2)
BASOPHILS # BLD AUTO: 0.05 X10(3)/MCL
BASOPHILS NFR BLD AUTO: 0.4 %
BILIRUB SERPL-MCNC: 0.4 MG/DL
BLOOD GAS SAMPLE TYPE (OHS): ABNORMAL
BUN SERPL-MCNC: 30.7 MG/DL (ref 8.4–25.7)
CA-I BLD-SCNC: 1.16 MMOL/L (ref 1.12–1.23)
CALCIUM SERPL-MCNC: 8.3 MG/DL (ref 8.8–10)
CHLORIDE SERPL-SCNC: 110 MMOL/L (ref 98–107)
CO2 BLDA-SCNC: 28.7 MMOL/L
CO2 SERPL-SCNC: 25 MMOL/L (ref 23–31)
COHGB MFR BLDA: 2 % (ref 0.5–1.5)
CREAT SERPL-MCNC: 0.72 MG/DL (ref 0.72–1.25)
CREAT/UREA NIT SERPL: 43
DRAWN BY BLOOD GAS (OHS): ABNORMAL
EOSINOPHIL # BLD AUTO: 0.23 X10(3)/MCL (ref 0–0.9)
EOSINOPHIL NFR BLD AUTO: 1.8 %
ERYTHROCYTE [DISTWIDTH] IN BLOOD BY AUTOMATED COUNT: 14.5 % (ref 11.5–17)
GFR SERPLBLD CREATININE-BSD FMLA CKD-EPI: >60 ML/MIN/1.73/M2
GLOBULIN SER-MCNC: 3.2 GM/DL (ref 2.4–3.5)
GLUCOSE SERPL-MCNC: 218 MG/DL (ref 82–115)
HCO3 BLDA-SCNC: 27.6 MMOL/L (ref 22–26)
HCT VFR BLD AUTO: 33.5 % (ref 42–52)
HGB BLD-MCNC: 10.9 G/DL (ref 14–18)
IMM GRANULOCYTES # BLD AUTO: 0.08 X10(3)/MCL (ref 0–0.04)
IMM GRANULOCYTES NFR BLD AUTO: 0.6 %
INHALED O2 CONCENTRATION: 30 %
LYMPHOCYTES # BLD AUTO: 2.31 X10(3)/MCL (ref 0.6–4.6)
LYMPHOCYTES NFR BLD AUTO: 18.5 %
MCH RBC QN AUTO: 28.7 PG (ref 27–31)
MCHC RBC AUTO-ENTMCNC: 32.5 G/DL (ref 33–36)
MCV RBC AUTO: 88.2 FL (ref 80–94)
METHGB MFR BLDA: 1.4 % (ref 0.4–1.5)
MODE (OHS): ABNORMAL
MONOCYTES # BLD AUTO: 0.9 X10(3)/MCL (ref 0.1–1.3)
MONOCYTES NFR BLD AUTO: 7.2 %
NEUTROPHILS # BLD AUTO: 8.9 X10(3)/MCL (ref 2.1–9.2)
NEUTROPHILS NFR BLD AUTO: 71.5 %
NRBC BLD AUTO-RTO: 0 %
O2 HB BLOOD GAS (OHS): 94.8 % (ref 94–97)
OXYGEN DEVICE BLOOD GAS (OHS): ABNORMAL
OXYHGB MFR BLDA: 10.9 G/DL (ref 12–16)
PCO2 BLDA: 37 MMHG (ref 35–45)
PEEP RESPIRATORY: 5 CMH2O
PH BLDA: 7.48 [PH] (ref 7.35–7.45)
PLATELET # BLD AUTO: 370 X10(3)/MCL (ref 130–400)
PMV BLD AUTO: 11.1 FL (ref 7.4–10.4)
PO2 BLDA: 81 MMHG (ref 80–100)
POCT GLUCOSE: 176 MG/DL (ref 70–110)
POCT GLUCOSE: 186 MG/DL (ref 70–110)
POCT GLUCOSE: 210 MG/DL (ref 70–110)
POCT GLUCOSE: 210 MG/DL (ref 70–110)
POCT GLUCOSE: 258 MG/DL (ref 70–110)
POCT GLUCOSE: 261 MG/DL (ref 70–110)
POTASSIUM BLOOD GAS (OHS): 3.5 MMOL/L (ref 3.5–5)
POTASSIUM SERPL-SCNC: 3.8 MMOL/L (ref 3.5–5.1)
PROT SERPL-MCNC: 5.5 GM/DL (ref 5.8–7.6)
PS (OHS): 10 CMH2O
RBC # BLD AUTO: 3.8 X10(6)/MCL (ref 4.7–6.1)
SAMPLE SITE BLOOD GAS (OHS): ABNORMAL
SAO2 % BLDA: 96.7 %
SODIUM BLOOD GAS (OHS): 141 MMOL/L (ref 137–145)
SODIUM SERPL-SCNC: 145 MMOL/L (ref 136–145)
WBC # BLD AUTO: 12.47 X10(3)/MCL (ref 4.5–11.5)

## 2025-02-15 PROCEDURE — 27100171 HC OXYGEN HIGH FLOW UP TO 24 HOURS

## 2025-02-15 PROCEDURE — 99900035 HC TECH TIME PER 15 MIN (STAT)

## 2025-02-15 PROCEDURE — 85025 COMPLETE CBC W/AUTO DIFF WBC: CPT

## 2025-02-15 PROCEDURE — 36415 COLL VENOUS BLD VENIPUNCTURE: CPT

## 2025-02-15 PROCEDURE — 25000003 PHARM REV CODE 250: Performed by: SURGERY

## 2025-02-15 PROCEDURE — 63600175 PHARM REV CODE 636 W HCPCS

## 2025-02-15 PROCEDURE — 36600 WITHDRAWAL OF ARTERIAL BLOOD: CPT

## 2025-02-15 PROCEDURE — 94760 N-INVAS EAR/PLS OXIMETRY 1: CPT

## 2025-02-15 PROCEDURE — 63600175 PHARM REV CODE 636 W HCPCS: Performed by: SURGERY

## 2025-02-15 PROCEDURE — 94003 VENT MGMT INPAT SUBQ DAY: CPT

## 2025-02-15 PROCEDURE — 80053 COMPREHEN METABOLIC PANEL: CPT

## 2025-02-15 PROCEDURE — 82803 BLOOD GASES ANY COMBINATION: CPT

## 2025-02-15 PROCEDURE — 94761 N-INVAS EAR/PLS OXIMETRY MLT: CPT | Mod: XB

## 2025-02-15 PROCEDURE — 20800000 HC ICU TRAUMA

## 2025-02-15 PROCEDURE — 99900026 HC AIRWAY MAINTENANCE (STAT)

## 2025-02-15 PROCEDURE — 25000003 PHARM REV CODE 250

## 2025-02-15 PROCEDURE — 99291 CRITICAL CARE FIRST HOUR: CPT | Mod: ,,, | Performed by: SURGERY

## 2025-02-15 RX ADMIN — METHYLPHENIDATE HYDROCHLORIDE 20 MG: 5 TABLET ORAL at 05:02

## 2025-02-15 RX ADMIN — INSULIN GLARGINE 40 UNITS: 100 INJECTION, SOLUTION SUBCUTANEOUS at 08:02

## 2025-02-15 RX ADMIN — METOPROLOL TARTRATE 50 MG: 50 TABLET, FILM COATED ORAL at 08:02

## 2025-02-15 RX ADMIN — FAMOTIDINE 20 MG: 20 TABLET, FILM COATED ORAL at 08:02

## 2025-02-15 RX ADMIN — INSULIN ASPART 6 UNITS: 100 INJECTION, SOLUTION INTRAVENOUS; SUBCUTANEOUS at 11:02

## 2025-02-15 RX ADMIN — INSULIN ASPART 3 UNITS: 100 INJECTION, SOLUTION INTRAVENOUS; SUBCUTANEOUS at 04:02

## 2025-02-15 RX ADMIN — CEFEPIME 1 G: 1 INJECTION, POWDER, FOR SOLUTION INTRAMUSCULAR; INTRAVENOUS at 08:02

## 2025-02-15 RX ADMIN — CEFEPIME 1 G: 1 INJECTION, POWDER, FOR SOLUTION INTRAMUSCULAR; INTRAVENOUS at 12:02

## 2025-02-15 RX ADMIN — ENOXAPARIN SODIUM 40 MG: 40 INJECTION SUBCUTANEOUS at 08:02

## 2025-02-15 RX ADMIN — METHYLPHENIDATE HYDROCHLORIDE 20 MG: 5 TABLET ORAL at 08:02

## 2025-02-15 RX ADMIN — LOSARTAN POTASSIUM 100 MG: 50 TABLET, FILM COATED ORAL at 08:02

## 2025-02-15 RX ADMIN — THERA TABS 1 TABLET: TAB at 08:02

## 2025-02-15 RX ADMIN — AMANTADINE HYDROCHLORIDE 100 MG: 50 SOLUTION ORAL at 08:02

## 2025-02-15 RX ADMIN — THIAMINE HCL TAB 100 MG 100 MG: 100 TAB at 08:02

## 2025-02-15 RX ADMIN — INSULIN ASPART 9 UNITS: 100 INJECTION, SOLUTION INTRAVENOUS; SUBCUTANEOUS at 08:02

## 2025-02-15 RX ADMIN — INSULIN ASPART 6 UNITS: 100 INJECTION, SOLUTION INTRAVENOUS; SUBCUTANEOUS at 12:02

## 2025-02-15 RX ADMIN — ACETAMINOPHEN 650 MG: 650 SOLUTION ORAL at 12:02

## 2025-02-15 RX ADMIN — METHOCARBAMOL 500 MG: 500 TABLET ORAL at 05:02

## 2025-02-15 RX ADMIN — METHOCARBAMOL 500 MG: 500 TABLET ORAL at 10:02

## 2025-02-15 RX ADMIN — FOLIC ACID 1 MG: 1 TABLET ORAL at 08:02

## 2025-02-15 RX ADMIN — ACETAMINOPHEN 650 MG: 650 SOLUTION ORAL at 05:02

## 2025-02-15 RX ADMIN — AMLODIPINE BESYLATE 10 MG: 5 TABLET ORAL at 08:02

## 2025-02-15 RX ADMIN — INSULIN ASPART 9 UNITS: 100 INJECTION, SOLUTION INTRAVENOUS; SUBCUTANEOUS at 03:02

## 2025-02-15 NOTE — PROGRESS NOTES
[facility] [address]  Neurosurgery  Progress Note    Subjective:     Interval History: NAEs overnight. Resting in bed. Follows commands in all extremities. Opens eyes and tracks to verbal stimuli.     Post-Op Info:  Procedure(s) (LRB):  CRANIOTOMY, FOR SUBDURAL HEMATOMA EVACUATION (Right)   11 Days Post-Op      Medications:  Continuous Infusions:   D10W   Intravenous Continuous PRN         Scheduled Meds:   amantadine HCL  100 mg Per G Tube BID    amLODIPine  10 mg Per G Tube Daily    enoxparin  40 mg Subcutaneous Q12H (prophylaxis, 0900/2100)    famotidine  20 mg Per G Tube BID    folic acid  1 mg Per G Tube Daily    insulin glargine U-100  40 Units Subcutaneous BID    losartan  100 mg Per G Tube QHS    methocarbamoL  500 mg Per G Tube Q8H    methylphenidate HCl  20 mg Per G Tube BID WM    metoprolol tartrate  50 mg Per G Tube BID    multivitamin  1 tablet Per G Tube Daily    thiamine  100 mg Per G Tube Daily     PRN Meds:  Current Facility-Administered Medications:     acetaminophen, 650 mg, Per G Tube, Q4H PRN    acetaminophen, 650 mg, Rectal, Q4H PRN    bisacodyL, 10 mg, Rectal, Daily PRN    ceFAZolin (Ancef) IV (PEDS and ADULTS), 2 g, Intravenous, On Call Procedure    D10W, , Intravenous, Continuous PRN    dextrose 50%, 12.5 g, Intravenous, PRN    dextrose 50%, 25 g, Intravenous, PRN    enalaprilat, 1.25 mg, Intravenous, Q4H PRN    fentaNYL, 50 mcg, Intravenous, Q2H PRN    glucagon (human recombinant), 1 mg, Intramuscular, PRN    glucagon (human recombinant), 1 mg, Intramuscular, PRN    hydrALAZINE, 10 mg, Intravenous, Q4H PRN    insulin aspart U-100, 0-15 Units, Subcutaneous, Q6H PRN    labetaloL, 20 mg, Intravenous, Q4H PRN    LORazepam, 2 mg, Per G Tube, Q4H PRN    ondansetron, 4 mg, Intravenous, Q6H PRN    oxyCODONE, 5 mg, Per G Tube, Q4H PRN    prochlorperazine, 5 mg, Intravenous, Q6H PRN     Review of Systems  Objective:     Weight: 80 kg (176 lb 5.9 oz)  Body mass index is 23.91  kg/m².  Vital Signs (Most Recent):  Temp: 99.2 °F (37.3 °C) (02/15/25 0400)  Pulse: 94 (02/15/25 0600)  Resp: (!) 28 (02/15/25 0600)  BP: (!) 143/84 (02/15/25 0600)  SpO2: 97 % (02/15/25 0600) Vital Signs (24h Range):  Temp:  [98.4 °F (36.9 °C)-102.1 °F (38.9 °C)] 99.2 °F (37.3 °C)  Pulse:  [] 94  Resp:  [14-36] 28  SpO2:  [94 %-99 %] 97 %  BP: (111-143)/(58-84) 143/84                Vent Mode: CPAP / PSV  Oxygen Concentration (%):  [30] 30  Resp Rate Total:  [0 br/min-31 br/min] 25 br/min  PEEP/CPAP:  [5 cmH20] 5 cmH20  Pressure Support:  [10 cmH20] 10 cmH20  Mean Airway Pressure:  [8 cmH20] 8 cmH20             Gastrostomy/Enterostomy 02/11/25 1017 LUQ (Active)   Securement secured to abdomen 02/15/25 0600   Interventions Prior to Feeding patency checked 02/15/25 0600   Feeding Type continuous;by pump 02/15/25 0600   Clamp Status/Tolerance unclamped 02/15/25 0600   Feeding Action feeding continued 02/15/25 0600   Dressing dry and intact 02/15/25 0600   Insertion Site dry 02/15/25 0600   Site Care site cleansed w/ sterile normal saline;sterile precut T-slit dressing applied 02/15/25 0600   Flush/Irrigation flushed w/;water;no resistance met 02/15/25 0600   Current Rate (mL/hr) 70 mL/hr 02/13/25 0800   Goal Rate (mL/hr) 70 mL/hr 02/13/25 0800   Intake (mL) 443 mL 02/14/25 1636   Water Bolus (mL) 600 mL 02/15/25 0555   Intake (mL) - Breast Milk Tube Feeding 278 02/13/25 1645   Formula Name Pivot 1.5 02/15/25 0600   Tube Feeding Intake (mL) 768 02/15/25 0555            Rectal Tube 02/11/25 1806 fecal management system (Active)   Reposition drainage bags for BMS & Garcia on opposite sides of bed 02/15/25 0600   Outcome gas expelled, stool evacuated 02/15/25 0600   Stool Color Brown 02/15/25 0600   Insertion Site Appearance no redness, warmth, tenderness, skin breakdown, drainage 02/15/25 0600   Flush/Irrigation flushed w/;water;no resistance met 02/15/25 0600   Intake (mL) 40 mL 02/14/25 1135   Rectal Tube Output  "200 mL 02/15/25 0555       Male External Urinary Catheter 02/06/25 2300 Small (Active)   Collection Container Other (Comment) 02/15/25 0600   Securement Method secured to lower ABD w/ tape 02/15/25 0600   Skin no redness;no breakdown 02/15/25 0600   Tolerance no signs/symptoms of discomfort 02/15/25 0600   Output (mL) 800 mL 02/15/25 0053   Catheter Change Date 02/14/25 02/14/25 2215   Catheter Change Time 2215 02/14/25 2215       Neurosurgery Physical Exam  Awake, alert.   Opens eyes and tracks to verbal stimuli.   Follows commands in all extremities  Incision CDI with staples overlying    Significant Labs:  Recent Labs   Lab 02/14/25  0343 02/15/25  0043    145   K 3.6 3.8    110*   CO2 25 25   BUN 33.3* 30.7*   CREATININE 0.76 0.72   CALCIUM 8.8 8.3*     Recent Labs   Lab 02/14/25  0343 02/15/25  0043   WBC 13.83* 12.47*   HGB 11.3* 10.9*   HCT 35.6* 33.5*    370     No results for input(s): "LABPT", "INR", "APTT" in the last 48 hours.  Microbiology Results (last 7 days)       Procedure Component Value Units Date/Time    Blood Culture [[id number]]  (Normal) Collected: 02/10/25 1137    Order Status: Completed Specimen: Blood Updated: 02/14/25 1401     Blood Culture No Growth At 96 Hours    Blood Culture [[id number]]  (Normal) Collected: 02/10/25 1043    Order Status: Completed Specimen: Blood Updated: 02/14/25 1201     Blood Culture No Growth At 96 Hours    Respiratory Culture [[id number]]  (Abnormal)  (Susceptibility) Collected: 02/10/25 1057    Order Status: Completed Specimen: Sputum from Endotracheal Aspirate Updated: 02/12/25 1221     Respiratory Culture Moderate Enterobacter cloacae complex     Comment: with normal respiratory autumn        GRAM STAIN Quality 2+      Moderate Gram positive cocci      Few Gram Positive Rods    Tissue Culture - Aerobic [[id number]] Collected: 02/04/25 1613    Order Status: Completed Specimen: Bone from Skull Updated: 02/09/25 1112     Tissue - Aerobic Culture " No Growth              Assessment/Plan:     Active Diagnoses:    Diagnosis Date Noted POA    PRINCIPAL PROBLEM:  SDH (subdural hematoma) [S06.5XAA] 02/04/2025 Yes    Acute hypoxemic respiratory failure [J96.01] 02/12/2025 Yes    Dysphagia [R13.10] 02/12/2025 No    VAP (ventilator-associated pneumonia) [J95.851] 02/12/2025 No    Intraparenchymal hemorrhage of brain [I61.9] 02/05/2025 Yes    IVH (intraventricular hemorrhage) [I61.5] 02/05/2025 Yes    SAH (subarachnoid hemorrhage) [I60.9] 02/04/2025 Yes    Closed fracture of one rib of right side [S22.31XA] 02/04/2025 Yes    Closed fracture of left side of occipital bone [S02.119A] 02/04/2025 Yes      Problems Resolved During this Admission:     -CT head on 2/17/25   -Continue current management  -Blood pressure control  -Frequent neurochecks  -remove every other staple on 2/16 and remaining out on 2/18.   -Placement     RICH Joseph  Neurosurgery  [facility] - [address] [place] [address]

## 2025-02-15 NOTE — PHYSICIAN QUERY
Due to the conflicting clinical picture, please clinically validate the diagnosis of Acute Hypoxemic Respiratory Failure. If validated, please provide additional clinical support for the diagnosis.  The respiratory condition has been ruled out

## 2025-02-15 NOTE — PROGRESS NOTES
Trauma ICU Progress Note    Patient Information:   Patient Name: Ezequiel Ramires                   : 1960     MRN: [id number]   Date of Admission: 2025  Code Status: Full Code  Date of Exam: 02/15/2025  HD#11  POD#11 Days Post-Op  Attending Provider: Maik Moreno Jr., *  Admission Summary:   Patient is a 64 year old  male with PMH of CAD, HTN, DM, HLD, alcoholism, arthritis, anxiety, MI who presents as a transfer from [facility] with bilateral SDH with 0.8 right to left midline shift, SAH, left occipital fracture, right 4th rib fracture. Report per wife patient was last seen normal around 1930 yesterday, she woke this morning to find him on the ground next to the bed with vomit on the floor, he was taken to the OSH at around 0500     Interval history:    NAEON. Had to be put on CPAP overnight from tiring out.    Consults:   Consults: Neurosurgery, Cardiology Injuries:  1. Acute nondisplaced R 4th rib fracture  2. Tree-in-bud nodularities in the bilateral lower lobes which could be due to infectious bronchiolitis or aspiration   3. 4 mm anterolisthesis of C4 on C5 due to degeneration versus trauma  4. Nondisplaced left occipital bone fracture extending inferiorly to the level of the foramen magnum   5. Bilateral SDH  6. Bilateral IPH  7. Bilateral SAH  8. Small layering hemorrhage in the left occipital horn   9. 8 mm right to left midline shift    [x]Problems list reviewed  [x]Tertiary exam performed Operations/Procedures:  Crani 25  Percutaneous tracheostomy   PEG      Past medical history:  HTN  HLD  DMII  Psoriasis  CAD s/p stents  MI    Medications: [x] Medications reviewed/updated   Home Meds:    Current Outpatient Medications   Medication Instructions    amLODIPine (NORVASC) 5 MG tablet 1 tablet, Daily    aspirin (ECOTRIN) 81 MG EC tablet 1 tablet, Oral, Every morning    ezetimibe (ZETIA) 10 mg tablet 1 tablet, Daily    icosapent ethyL (VASCEPA) 2,000 mg, Nightly    losartan (COZAAR) 100 MG  tablet 1 tablet, Nightly    metoprolol tartrate (LOPRESSOR) 50 MG tablet 1 tablet, 2 times daily    prasugreL HCl (EFFIENT) 10 mg, Daily    rosuvastatin (CRESTOR) 10 MG tablet 1 tablet, Nightly    semaglutide (OZEMPIC SUBQ) Subcutaneous      Scheduled Meds:    amantadine HCL  100 mg Per G Tube BID    amLODIPine  10 mg Per G Tube Daily    enoxparin  40 mg Subcutaneous Q12H (prophylaxis, 0900/2100)    famotidine  20 mg Per G Tube BID    folic acid  1 mg Per G Tube Daily    insulin glargine U-100  40 Units Subcutaneous BID    losartan  100 mg Per G Tube QHS    methocarbamoL  500 mg Per G Tube Q8H    methylphenidate HCl  20 mg Per G Tube BID WM    metoprolol tartrate  50 mg Per G Tube BID    multivitamin  1 tablet Per G Tube Daily    thiamine  100 mg Per G Tube Daily     Continuous Infusions:    D10W   Intravenous Continuous PRN         PRN Meds:   Current Facility-Administered Medications:     acetaminophen, 650 mg, Per G Tube, Q4H PRN    acetaminophen, 650 mg, Rectal, Q4H PRN    bisacodyL, 10 mg, Rectal, Daily PRN    ceFAZolin (Ancef) IV (PEDS and ADULTS), 2 g, Intravenous, On Call Procedure    D10W, , Intravenous, Continuous PRN    dextrose 50%, 12.5 g, Intravenous, PRN    dextrose 50%, 25 g, Intravenous, PRN    enalaprilat, 1.25 mg, Intravenous, Q4H PRN    fentaNYL, 50 mcg, Intravenous, Q2H PRN    glucagon (human recombinant), 1 mg, Intramuscular, PRN    glucagon (human recombinant), 1 mg, Intramuscular, PRN    hydrALAZINE, 10 mg, Intravenous, Q4H PRN    insulin aspart U-100, 0-15 Units, Subcutaneous, Q6H PRN    labetaloL, 20 mg, Intravenous, Q4H PRN    LORazepam, 2 mg, Per G Tube, Q4H PRN    ondansetron, 4 mg, Intravenous, Q6H PRN    oxyCODONE, 5 mg, Per G Tube, Q4H PRN    prochlorperazine, 5 mg, Intravenous, Q6H PRN     Vitals:  VITAL SIGNS: 24 HR MIN & MAX LAST   Temp  Min: 98.4 °F (36.9 °C)  Max: 102.1 °F (38.9 °C)  99.2 °F (37.3 °C)   BP  Min: 111/74  Max: 143/84  (!) 143/84    Pulse  Min: 80  Max: 106  94   "  Resp  Min: 14  Max: 36  (!) 28    SpO2  Min: 94 %  Max: 99 %  97 %      HT: 6' 0.01" (182.9 cm)  WT: 80 kg (176 lb 5.9 oz)  BMI: 23.9   Ideal Body Weight (IBW), Male: 178.06 lb  % Ideal Body Weight, Male (lb): 99.08 %        General  Exam: intubated      Neuro/Psych  GCS: 11T (E 4) (V T) (M 6)  Exam: Patient is following commands moving his toes for me and opening his eyes to command. Crani incision c/d/I Corbin drain +SS drainage  ICP monitor: No  ICP treatment: No  C-Collar: No    Plan:   Bilateral SDH s/p crani-  Keppra x 7 days, HOB > 30 degrees. BP<140.   Continue amantadine, ritalin   Stop Keppra   CTM     HEENT  Exam: NCAT. Trach collar in place.     Plan:   monitoring     Pulmonary  Vitals: Resp  Av.9  Min: 14  Max: 36  SpO2  Av.1 %  Min: 94 %  Max: 99 %    Ventilator/Oxygen Settings:   Vent Mode: CPAP / PSV  Vt Set: 500 mL  Set Rate: 6 BPM  Pressure Support: 10 cmH20  I:E Ratio Measured: 1:2.4  Total PEEP: 5 cmH20 Vent Mode: CPAP / PSV (02/15/25 0332)  Ventilator Initiated: Yes (25 1240)  Set Rate: 6 BPM (25 0510)  Vt Set: 500 mL (2510)  Pressure Support: 10 cmH20 (02/15/25 0332)  PEEP/CPAP: 5 cmH20 (02/15/25 0332)  Oxygen Concentration (%): 30 (02/15/25 0557)  Peak Airway Pressure: 16 cmH20 (02/15/25 0332)  Total Ve: 11 L/m (02/15/25 0332)  F/VT Ratio<105 (RSBI): (!) 57.08 (02/15/25 0332)        ABG:   Recent Labs   Lab 02/15/25  0538   PH 7.480*   PO2 81.0   PCO2 37.0   HCO3 27.6*        CXR:    X-Ray Chest 1 View for Line/Tube Placement    Result Date: 2025  PICC line insertion. Confluent opacities in the left retrocardiac region with silhouetting of the left hemidiaphragm which might be related to an infiltrate/atelectasis. No other change Electronically signed by: Serg Mera Date:    2025 Time:    11:33        Rib fractures: right rib fracture  Chest Tube: None     Exam: Coarse BS bilaterally on CPAP    Plan:     Trach collar today   CTM  Incentive " "Spirometry/RT Treatments: none     Cardiovascular  Vitals: Pulse  Av.9  Min: 80  Max: 106  BP  Min: 111/74  Max: 143/84  No results for input(s): "TROPONINI", "CKTOTAL", "CKMB", "BNP" in the last 168 hours.  Vasoactive Agents: None  Exam: RRR    Plan:   HTN- continue home meds and prns     Renal  Recent Labs     25  0355 25  0343 02/15/25  0043   BUN 29.0* 33.3* 30.7*   CREATININE 0.73 0.76 0.72       No results for input(s): "LACTIC" in the last 72 hours.    Intake/Output - Last 3 Shifts          0700   0659  0700  02/15 0659 02/15 07 0659    I.V. (mL/kg)  15 (0.2)     NG/GT 1894 2201     Total Intake(mL/kg) 1894 (23.7) 2216 (27.7)     Urine (mL/kg/hr) 1650 (0.9) 1600 (0.8)     Stool 300 400     Total Output 1950 2000     Net -56 +216            Urine Occurrence  1 x     Stool Occurrence 2 x 1 x              Intake/Output Summary (Last 24 hours) at 2/15/2025 0941  Last data filed at 2/15/2025 0555  Gross per 24 hour   Intake 2216 ml   Output 2000 ml   Net 216 ml         Garcia: no    Plan:   monitor     FEN/GI  Recent Labs     25  0355 25  0343 02/15/25  0043    141 145   K 3.7 3.6 3.8   * 107 110*   CO2    CALCIUM 8.6* 8.8 8.3*   ALBUMIN 2.3* 2.3* 2.3*   BILITOT 0.4 0.4 0.4   AST 17 30 31   ALKPHOS 75 86 73   ALT 22 21 23       Diet: NPO. Resume tube feeds s/p PEG.     Last BM: +BM    Abdominal Exam: s/NT/ND +BS. PEG tube in place.     Plan:   Tube feeds through PEG  Bowel regimen     Heme/Onc  Recent Labs     25  0355 25  0343 02/15/25  0043   HGB 11.0* 11.3* 10.9*   HCT 34.2* 35.6* 33.5*    348 370       Transfusions (over past 24h): None    Plan:   monitor     ID  Temp  Av.3 °F (37.9 °C)  Min: 98.4 °F (36.9 °C)  Max: 102.1 °F (38.9 °C)      Recent Labs     25  0355 25  0343 02/15/25  0043   WBC 11.20 13.83* 12.47*       Cultures: Antibiotics:    BCX x2 () in process  RCX () Enterobacter 1. " Cefepime       Plan:   VAP- Cefepime to completion Saturday 2/15     Endocrine  Recent Labs     02/13/25  0355 02/14/25  0343 02/15/25  0043   GLUCOSE 307* 292* 218*      Recent Labs     02/14/25  0358 02/14/25  0825 02/14/25  1108 02/14/25  1625 02/14/25  1958 02/15/25  0002 02/15/25  0401 02/15/25  0826   POCTGLUCOSE 297* 267* 288* 187* 252* 210* 186* 176*        Plan:   DMII-High sliding scale. Lantus 40 BID.  Insulin treatment: High sliding scale and Lantus 40 BID     Musculoskeletal  Weight bearing status:   RUE: WBAT  LUE: WBAT  RLE: WBAT  LLE: WBAT    Exam: does not have purposeful movement  Plan:   monitor     Wounds  Wounds exam: crani incision c/d/I RORO drain in place  Wound vac: No   Media: none  Plan: local wound care     Precautions  Precautions: Seizure     Prophylaxis  Seizure: Keppra completed  DVT: Lovenox  GI: H2B     Lines/drains/airway   Lines/Drains/Airways       Drain  Duration             Male External Urinary Catheter 02/06/25 2300 Small 8 days         Gastrostomy/Enterostomy 02/11/25 1017 LUQ 3 days         Rectal Tube 02/11/25 1806 fecal management system 3 days              Airway  Duration             Adult Surgical Airway 02/11/25 0958 Shiley Cuffed 8.0 / 85 mm 3 days              Peripheral Intravenous Line  Duration                  Peripheral IV - Single Lumen 02/04/25 1730 20 G;18 G Right Antecubital 10 days         Peripheral IV - Single Lumen 02/08/25 1600 18 G Anterior;Right Upper Arm 6 days                    Plan:  Cont all lines    [x]LDA reviewed/updated      Restraints  Face to face evaluation of need for restraints on rounds today:   Currently restrained? No.        Assessment & Disposition:   Problem list:  Active Problem List with Overview Notes    Diagnosis Date Noted    Acute hypoxemic respiratory failure 02/12/2025    Dysphagia 02/12/2025    VAP (ventilator-associated pneumonia) 02/12/2025    Intraparenchymal hemorrhage of brain 02/05/2025    IVH (intraventricular  hemorrhage) 02/05/2025    SDH (subdural hematoma) 02/04/2025    SAH (subarachnoid hemorrhage) 02/04/2025    Closed fracture of one rib of right side 02/04/2025    Closed fracture of left side of occipital bone 02/04/2025       Cont ICU   Bilateral SDH s/p crani- Keppra x 7 days (stop 2/11), HOB > 30 degrees. BP<140. Continue amantadine, ritalin.  Wean back to trach collar today   HTN-  continue home meds and PRNs  VAP- Cefepime to Saturday 2/15  Lovenox  PT/OT - high intensity therapy  DMII-increased to high sliding scale; latus 40 BID  Approved to [facility] waiting on insurance        Critical Care Time:   41 minutes of critical care was spent on this patient personally by me on the following activities: development of treatment plan with patient and bedside nurse, discussions with consultants, evaluation of patient's response to treatment, examining the patient, ordering and preforming treatments and interventions, ordering and reviewing laboratory studies, ordering and reviewing radiologic studies, and re-evaluation of patient's condition.     Daisy Moreno Jr., MD, MS  Trauma Critical Care Surgery  [facility]   02/15/2025

## 2025-02-16 LAB
ALBUMIN SERPL-MCNC: 2.1 G/DL (ref 3.4–4.8)
ALBUMIN/GLOB SERPL: 0.5 RATIO (ref 1.1–2)
ALLENS TEST BLOOD GAS (OHS): YES
ALP SERPL-CCNC: 82 UNIT/L (ref 40–150)
ALT SERPL-CCNC: 22 UNIT/L (ref 0–55)
ANION GAP SERPL CALC-SCNC: 12 MEQ/L
AST SERPL-CCNC: 22 UNIT/L (ref 5–34)
BASE EXCESS BLD CALC-SCNC: 4.1 MMOL/L (ref -2–2)
BASOPHILS # BLD AUTO: 0.06 X10(3)/MCL
BASOPHILS NFR BLD AUTO: 0.5 %
BILIRUB SERPL-MCNC: 0.4 MG/DL
BLOOD GAS SAMPLE TYPE (OHS): ABNORMAL
BUN SERPL-MCNC: 29.2 MG/DL (ref 8.4–25.7)
CA-I BLD-SCNC: 1.14 MMOL/L (ref 1.12–1.23)
CALCIUM SERPL-MCNC: 8.6 MG/DL (ref 8.8–10)
CHLORIDE SERPL-SCNC: 107 MMOL/L (ref 98–107)
CO2 BLDA-SCNC: 28.1 MMOL/L
CO2 SERPL-SCNC: 25 MMOL/L (ref 23–31)
COHGB MFR BLDA: 1.9 % (ref 0.5–1.5)
CREAT SERPL-MCNC: 0.73 MG/DL (ref 0.72–1.25)
CREAT/UREA NIT SERPL: 40
DRAWN BY BLOOD GAS (OHS): ABNORMAL
EOSINOPHIL # BLD AUTO: 0.12 X10(3)/MCL (ref 0–0.9)
EOSINOPHIL NFR BLD AUTO: 0.9 %
ERYTHROCYTE [DISTWIDTH] IN BLOOD BY AUTOMATED COUNT: 14.4 % (ref 11.5–17)
GFR SERPLBLD CREATININE-BSD FMLA CKD-EPI: >60 ML/MIN/1.73/M2
GLOBULIN SER-MCNC: 4.2 GM/DL (ref 2.4–3.5)
GLUCOSE SERPL-MCNC: 294 MG/DL (ref 82–115)
HCO3 BLDA-SCNC: 27.1 MMOL/L (ref 22–26)
HCT VFR BLD AUTO: 32.5 % (ref 42–52)
HGB BLD-MCNC: 10.3 G/DL (ref 14–18)
IMM GRANULOCYTES # BLD AUTO: 0.1 X10(3)/MCL (ref 0–0.04)
IMM GRANULOCYTES NFR BLD AUTO: 0.8 %
INHALED O2 CONCENTRATION: 30 %
LYMPHOCYTES # BLD AUTO: 1.88 X10(3)/MCL (ref 0.6–4.6)
LYMPHOCYTES NFR BLD AUTO: 14.8 %
MCH RBC QN AUTO: 28.9 PG (ref 27–31)
MCHC RBC AUTO-ENTMCNC: 31.7 G/DL (ref 33–36)
MCV RBC AUTO: 91 FL (ref 80–94)
MECH RR (OHS): 6 B/MIN
METHGB MFR BLDA: 1.3 % (ref 0.4–1.5)
MODE (OHS): ABNORMAL
MONOCYTES # BLD AUTO: 0.86 X10(3)/MCL (ref 0.1–1.3)
MONOCYTES NFR BLD AUTO: 6.8 %
NEUTROPHILS # BLD AUTO: 9.66 X10(3)/MCL (ref 2.1–9.2)
NEUTROPHILS NFR BLD AUTO: 76.2 %
NRBC BLD AUTO-RTO: 0 %
O2 HB BLOOD GAS (OHS): 94.3 % (ref 94–97)
OXYGEN DEVICE BLOOD GAS (OHS): ABNORMAL
OXYHGB MFR BLDA: 10.8 G/DL (ref 12–16)
PCO2 BLDA: 34 MMHG (ref 35–45)
PEEP RESPIRATORY: 5 CMH2O
PH BLDA: 7.51 [PH] (ref 7.35–7.45)
PLATELET # BLD AUTO: 404 X10(3)/MCL (ref 130–400)
PMV BLD AUTO: 11.2 FL (ref 7.4–10.4)
PO2 BLDA: 72 MMHG (ref 80–100)
POCT GLUCOSE: 173 MG/DL (ref 70–110)
POCT GLUCOSE: 229 MG/DL (ref 70–110)
POCT GLUCOSE: 265 MG/DL (ref 70–110)
POCT GLUCOSE: 266 MG/DL (ref 70–110)
POCT GLUCOSE: 274 MG/DL (ref 70–110)
POCT GLUCOSE: 286 MG/DL (ref 70–110)
POTASSIUM BLOOD GAS (OHS): 4 MMOL/L (ref 3.5–5)
POTASSIUM SERPL-SCNC: 3.9 MMOL/L (ref 3.5–5.1)
PROT SERPL-MCNC: 6.3 GM/DL (ref 5.8–7.6)
PS (OHS): 10 CMH2O
RBC # BLD AUTO: 3.57 X10(6)/MCL (ref 4.7–6.1)
SAMPLE SITE BLOOD GAS (OHS): ABNORMAL
SAO2 % BLDA: 95.8 %
SODIUM BLOOD GAS (OHS): 140 MMOL/L (ref 137–145)
SODIUM SERPL-SCNC: 144 MMOL/L (ref 136–145)
SPONT+MECH VT ON VENT: 500 ML
WBC # BLD AUTO: 12.68 X10(3)/MCL (ref 4.5–11.5)

## 2025-02-16 PROCEDURE — 85025 COMPLETE CBC W/AUTO DIFF WBC: CPT

## 2025-02-16 PROCEDURE — 97530 THERAPEUTIC ACTIVITIES: CPT | Mod: CQ

## 2025-02-16 PROCEDURE — 94761 N-INVAS EAR/PLS OXIMETRY MLT: CPT | Mod: XB

## 2025-02-16 PROCEDURE — 27100171 HC OXYGEN HIGH FLOW UP TO 24 HOURS

## 2025-02-16 PROCEDURE — 97535 SELF CARE MNGMENT TRAINING: CPT | Mod: CO

## 2025-02-16 PROCEDURE — 63600175 PHARM REV CODE 636 W HCPCS: Performed by: SURGERY

## 2025-02-16 PROCEDURE — 87040 BLOOD CULTURE FOR BACTERIA: CPT | Performed by: SURGERY

## 2025-02-16 PROCEDURE — 87077 CULTURE AEROBIC IDENTIFY: CPT | Performed by: SURGERY

## 2025-02-16 PROCEDURE — 25000003 PHARM REV CODE 250

## 2025-02-16 PROCEDURE — 94760 N-INVAS EAR/PLS OXIMETRY 1: CPT

## 2025-02-16 PROCEDURE — 25000003 PHARM REV CODE 250: Performed by: SURGERY

## 2025-02-16 PROCEDURE — 36415 COLL VENOUS BLD VENIPUNCTURE: CPT

## 2025-02-16 PROCEDURE — 36600 WITHDRAWAL OF ARTERIAL BLOOD: CPT

## 2025-02-16 PROCEDURE — 99291 CRITICAL CARE FIRST HOUR: CPT | Mod: ,,, | Performed by: SURGERY

## 2025-02-16 PROCEDURE — 82803 BLOOD GASES ANY COMBINATION: CPT

## 2025-02-16 PROCEDURE — 97129 THER IVNTJ 1ST 15 MIN: CPT | Mod: CO

## 2025-02-16 PROCEDURE — 25000242 PHARM REV CODE 250 ALT 637 W/ HCPCS: Performed by: SURGERY

## 2025-02-16 PROCEDURE — 20800000 HC ICU TRAUMA

## 2025-02-16 PROCEDURE — 99900026 HC AIRWAY MAINTENANCE (STAT)

## 2025-02-16 PROCEDURE — 63600175 PHARM REV CODE 636 W HCPCS

## 2025-02-16 PROCEDURE — 27200966 HC CLOSED SUCTION SYSTEM

## 2025-02-16 PROCEDURE — 94003 VENT MGMT INPAT SUBQ DAY: CPT

## 2025-02-16 PROCEDURE — 94640 AIRWAY INHALATION TREATMENT: CPT

## 2025-02-16 PROCEDURE — 99900035 HC TECH TIME PER 15 MIN (STAT)

## 2025-02-16 PROCEDURE — 99900031 HC PATIENT EDUCATION (STAT)

## 2025-02-16 PROCEDURE — 80053 COMPREHEN METABOLIC PANEL: CPT

## 2025-02-16 PROCEDURE — 36415 COLL VENOUS BLD VENIPUNCTURE: CPT | Performed by: SURGERY

## 2025-02-16 RX ORDER — SODIUM CHLORIDE FOR INHALATION 3 %
2 VIAL, NEBULIZER (ML) INHALATION EVERY 4 HOURS
Status: DISCONTINUED | OUTPATIENT
Start: 2025-02-16 | End: 2025-02-21 | Stop reason: HOSPADM

## 2025-02-16 RX ORDER — IPRATROPIUM BROMIDE AND ALBUTEROL SULFATE 2.5; .5 MG/3ML; MG/3ML
3 SOLUTION RESPIRATORY (INHALATION) EVERY 4 HOURS
Status: DISCONTINUED | OUTPATIENT
Start: 2025-02-16 | End: 2025-02-21 | Stop reason: HOSPADM

## 2025-02-16 RX ADMIN — INSULIN ASPART 3 UNITS: 100 INJECTION, SOLUTION INTRAVENOUS; SUBCUTANEOUS at 04:02

## 2025-02-16 RX ADMIN — VANCOMYCIN HYDROCHLORIDE 1000 MG: 1 INJECTION, POWDER, LYOPHILIZED, FOR SOLUTION INTRAVENOUS at 11:02

## 2025-02-16 RX ADMIN — IPRATROPIUM BROMIDE AND ALBUTEROL SULFATE 3 ML: 2.5; .5 SOLUTION RESPIRATORY (INHALATION) at 01:02

## 2025-02-16 RX ADMIN — ENOXAPARIN SODIUM 40 MG: 40 INJECTION SUBCUTANEOUS at 08:02

## 2025-02-16 RX ADMIN — PIPERACILLIN SODIUM AND TAZOBACTAM SODIUM 4.5 G: 4; .5 INJECTION, POWDER, LYOPHILIZED, FOR SOLUTION INTRAVENOUS at 10:02

## 2025-02-16 RX ADMIN — SODIUM CHLORIDE SOLN NEBU 3% 2 ML: 3 NEBU SOLN at 07:02

## 2025-02-16 RX ADMIN — IPRATROPIUM BROMIDE AND ALBUTEROL SULFATE 3 ML: 2.5; .5 SOLUTION RESPIRATORY (INHALATION) at 11:02

## 2025-02-16 RX ADMIN — SODIUM CHLORIDE SOLN NEBU 3% 2 ML: 3 NEBU SOLN at 04:02

## 2025-02-16 RX ADMIN — FAMOTIDINE 20 MG: 20 TABLET, FILM COATED ORAL at 08:02

## 2025-02-16 RX ADMIN — INSULIN ASPART 9 UNITS: 100 INJECTION, SOLUTION INTRAVENOUS; SUBCUTANEOUS at 12:02

## 2025-02-16 RX ADMIN — PIPERACILLIN SODIUM AND TAZOBACTAM SODIUM 4.5 G: 4; .5 INJECTION, POWDER, LYOPHILIZED, FOR SOLUTION INTRAVENOUS at 04:02

## 2025-02-16 RX ADMIN — INSULIN ASPART 9 UNITS: 100 INJECTION, SOLUTION INTRAVENOUS; SUBCUTANEOUS at 08:02

## 2025-02-16 RX ADMIN — INSULIN GLARGINE 40 UNITS: 100 INJECTION, SOLUTION SUBCUTANEOUS at 08:02

## 2025-02-16 RX ADMIN — VANCOMYCIN HYDROCHLORIDE 1000 MG: 1 INJECTION, POWDER, LYOPHILIZED, FOR SOLUTION INTRAVENOUS at 10:02

## 2025-02-16 RX ADMIN — SODIUM CHLORIDE SOLN NEBU 3% 2 ML: 3 NEBU SOLN at 01:02

## 2025-02-16 RX ADMIN — ACETAMINOPHEN 650 MG: 650 SOLUTION ORAL at 04:02

## 2025-02-16 RX ADMIN — IPRATROPIUM BROMIDE AND ALBUTEROL SULFATE 3 ML: 2.5; .5 SOLUTION RESPIRATORY (INHALATION) at 07:02

## 2025-02-16 RX ADMIN — LOSARTAN POTASSIUM 100 MG: 50 TABLET, FILM COATED ORAL at 08:02

## 2025-02-16 RX ADMIN — THERA TABS 1 TABLET: TAB at 08:02

## 2025-02-16 RX ADMIN — THIAMINE HCL TAB 100 MG 100 MG: 100 TAB at 08:02

## 2025-02-16 RX ADMIN — AMLODIPINE BESYLATE 10 MG: 5 TABLET ORAL at 08:02

## 2025-02-16 RX ADMIN — METHOCARBAMOL 500 MG: 500 TABLET ORAL at 06:02

## 2025-02-16 RX ADMIN — METHYLPHENIDATE HYDROCHLORIDE 20 MG: 5 TABLET ORAL at 08:02

## 2025-02-16 RX ADMIN — INSULIN ASPART 9 UNITS: 100 INJECTION, SOLUTION INTRAVENOUS; SUBCUTANEOUS at 04:02

## 2025-02-16 RX ADMIN — METOPROLOL TARTRATE 50 MG: 50 TABLET, FILM COATED ORAL at 08:02

## 2025-02-16 RX ADMIN — AMANTADINE HYDROCHLORIDE 100 MG: 50 SOLUTION ORAL at 08:02

## 2025-02-16 RX ADMIN — VANCOMYCIN HYDROCHLORIDE 1500 MG: 1.5 INJECTION, POWDER, LYOPHILIZED, FOR SOLUTION INTRAVENOUS at 10:02

## 2025-02-16 RX ADMIN — SODIUM CHLORIDE SOLN NEBU 3% 2 ML: 3 NEBU SOLN at 11:02

## 2025-02-16 RX ADMIN — ACETAMINOPHEN 650 MG: 650 SOLUTION ORAL at 12:02

## 2025-02-16 RX ADMIN — FOLIC ACID 1 MG: 1 TABLET ORAL at 08:02

## 2025-02-16 RX ADMIN — INSULIN ASPART 9 UNITS: 100 INJECTION, SOLUTION INTRAVENOUS; SUBCUTANEOUS at 11:02

## 2025-02-16 RX ADMIN — METHYLPHENIDATE HYDROCHLORIDE 20 MG: 5 TABLET ORAL at 04:02

## 2025-02-16 RX ADMIN — IPRATROPIUM BROMIDE AND ALBUTEROL SULFATE 3 ML: 2.5; .5 SOLUTION RESPIRATORY (INHALATION) at 04:02

## 2025-02-16 NOTE — PT/OT/SLP PROGRESS
Physical Therapy Treatment    Patient Name:  Ezequiel Ramires   MRN:  [id number]    Recommendations:     Discharge therapy intensity: High Intensity Therapy   Discharge Equipment Recommendations:  (TBD)  Barriers to discharge: Impaired mobility, Ongoing medical needs, and placement    Assessment:     Ezequiel Ramires is a 65 y.o. male admitted with a medical diagnosis of B SDH s/p R craniectomy, SAH, IPH, L occipital bone fx, R 4th rib fx. Patient developed ST with bigeminy; is now s/p trach and PEG placement.  He presents with the following impairments/functional limitations: weakness, impaired self care skills, impaired functional mobility, decreased lower extremity function, impaired endurance, impaired balance.    Rehab Prognosis: Good; patient would benefit from acute skilled PT services to address these deficits and reach maximum level of function.    Recent Surgery: Procedure(s) (LRB):  CRANIOTOMY, FOR SUBDURAL HEMATOMA EVACUATION (Right) 12 Days Post-Op    Plan:     During this hospitalization, patient would benefit from acute PT services 6 x/week to address the identified rehab impairments via therapeutic activities, therapeutic exercises and progress toward the following goals:    Plan of Care Expires:  03/08/25    Subjective     Chief Complaint: none verbalized  Patient/Family Comments/goals: none verbalized  Pain/Comfort:         Objective:     Communicated with RN prior to session.  Patient found HOB elevated with blood pressure cuff, PEG Tube, PureWick, peripheral IV, PRAFO, pulse ox (continuous), telemetry, SCD, Tracheostomy upon PT entry to room.     General Precautions: Standard, fall (BP < 150/90, R bone flap pxns)  Orthopedic Precautions: N/A  Braces:  (helmet)  Respiratory Status:  T piece 5L    SpO2: 97%  Blood Pressure: 131/71  HR: 95  Skin Integrity: Visible skin intact      Functional Mobility:  Bed Mobility:     Rolling L/R: maximal assistance  Supine to Sit: maximal assistance and of 2  persons  Transfers:     Bed to Chair: maximal assistance and of 2 persons with  no AD  using  Squat Pivot  Balance: static sitting balance EOB maxA 2/2 low trunk tone and poor trunk/head control, pt with very stooped/kyphotic posture  pt with R lateral lean when sitting in recliner chair    Therapeutic Activities/Exercises:  Patient with (+) BM upon entry, rolling was performed for total A posterior pericare. Pt then sat EOB x8 mins with maxA for balance. Patient was then assisted to reclining chair.    Education:  Patient and family were provided with verbal education education regarding PT role/goals/POC, fall prevention, and safety awareness.  Additional teaching is warranted.     Patient left up in chair with all lines intact, call button in reach, patience pad in place, RN notified, and family present    GOALS:   Multidisciplinary Problems       Physical Therapy Goals          Problem: Physical Therapy    Goal Priority Disciplines Outcome Interventions   Physical Therapy Goal     PT, PT/OT Progressing    Description: Goals to be met by: d/c     Patient will increase functional independence with mobility by performin. Supine to sit with MInimal Assistance  2. Sit to supine with MInimal Assistance  3. Sit to stand transfer with Minimal Assistance  4. Bed to chair transfer with Minimal Assistance using LRAD vs pivot  5. 10' with B HHA MaxA                          Time Tracking:     PT Received On: 25  PT Start Time: 1212     PT Stop Time: 1255  PT Total Time (min): 43 min     Billable Minutes: Therapeutic Activity 43    Treatment Type: Treatment  PT/PTA: PTA     Number of PTA visits since last PT visit: 2025

## 2025-02-16 NOTE — PT/OT/SLP PROGRESS
Occupational Therapy   Treatment    Name: Ezequiel Ramires  MRN: [id number]  Admitting Diagnosis:  SDH (subdural hematoma)  12 Days Post-Op    Recommendations:     Recommended therapy intensity at discharge: High Intensity Therapy   Discharge Equipment Recommendations:  to be determined by next level of care  Barriers to discharge:       Assessment:     Ezequiel Ramires is a 65 y.o. male with a medical diagnosis of SDH (subdural hematoma).  Performance deficits affecting function are weakness, impaired endurance, impaired self care skills, impaired sensation, impaired functional mobility, impaired balance, visual deficits, decreased upper extremity function, decreased lower extremity function, decreased coordination, decreased safety awareness, decreased ROM.     Rehab Prognosis:  Good; patient would benefit from acute skilled OT services to address these deficits and reach maximum level of function.       Plan:     Patient to be seen 5 x/week to address the above listed problems via self-care/home management, therapeutic activities, therapeutic exercises, neuromuscular re-education  Plan of Care Expires: 03/07/25  Plan of Care Reviewed with: patient, spouse    Subjective     Pain/Comfort:       Objective:     Communicated with: RN prior to session.  Patient found HOB elevated with   upon OT entry to room.    General Precautions: Standard, fall (SBP < 140, R bone flap pxs)    Orthopedic Precautions:N/A  Braces:  (helmet)  Respiratory Status:  trach collar 5L  Vital Signs: Blood Pressure: 131/71  HR: 96  Sp02: 97     Occupational Performance: Helmet donned prior to mobilization    Bed Mobility:    Patient completed Rolling/Turning to Left with  total assistance  Patient completed Rolling/Turning to Right with total assistance  Patient completed Supine to Sit with total assistance     Functional Mobility/Transfers:  Patient completed Bed <> Chair Transfer using Squat Pivot technique with total assistance and of 2 persons with no  assistive device  Functional Mobility: patient t/f from EOB to bedside chair via squat pivot this date    Activities of Daily Living:  Grooming: total assistance to wash face  Upper Body Dressing: total assistance donning/doffing gown  Toileting: total assistance pericare following +BM performed in sidelying    Therapeutic Activities:  Patient answering yes/no questions via head shake/nodd and was noted to attempt to mouth words.      Therapeutic Positioning    OT interventions performed during the course of today's session in an effort to prevent and/or reduce acquired pressure injuries:   Therapeutic positioning was provided at the conclusion of session to offload all bony prominences for the prevention and/or reduction of pressure injuries    Patient Education:  Patient and spouse were provided with verbal education education regarding OT role/goals/POC.  Additional teaching is warranted.      Patient left up in chair with all lines intact, call button in reach, and RN notified.    GOALS:   Multidisciplinary Problems       Occupational Therapy Goals          Problem: Occupational Therapy    Goal Priority Disciplines Outcome Interventions   Occupational Therapy Goal     OT, PT/OT Progressing    Description: LTG: Pt will perform basic ADLs and ADL t/fs with min A using LRAD by d/c.    STG: to be met by 3/7/25  1. Pt will follow 100% of simple one step commands across 3 sessions  2. Pt will perform grooming EOB with min A.   3. Pt will perform functional grasp/reach/release of items >80% of trials in prep for increased participation in ADL tasks.   4. Pt will perform sit<>stand with mod A in prep for ADL t/fs.  5. Pt will perform bedside commode transfer with mod A.                         Time Tracking:     OT Date of Treatment: 02/16/25  OT Start Time: 1212  OT Stop Time: 1255  OT Total Time (min): 43 min    Billable Minutes:Self Care/Home Management 2  Cognitive Retraining 1    OT/WENDI: WENDI     Number of WENDI  visits since last OT visit: 1    2/16/2025

## 2025-02-16 NOTE — PROGRESS NOTES
Pharmacokinetic Initial Assessment: IV Vancomycin    Assessment/Plan:    Initiate intravenous vancomycin with loading dose of 2000 mg once followed by a maintenance dose of vancomycin 1500mg IV every 12 hours.  Desired empiric serum trough concentration is 15 to 20 mcg/mL.  Draw vancomycin trough level 60 min prior to 5th dose on 02/18 at approximately 0900.  Pharmacy will continue to follow and monitor vancomycin.      Please contact pharmacy at extension [phone] with any questions regarding this assessment.     Thank you for the consult,   Dieudonne Escobar       Patient brief summary:  Ezequiel Ramires is a 65 y.o. male initiated on antimicrobial therapy with IV Vancomycin for treatment of suspected lower respiratory infection    Drug Allergies:   Review of patient's allergies indicates:  No Known Allergies    Actual Body Weight:   Wt Readings from Last 1 Encounters:   02/05/25 80 kg (176 lb 5.9 oz)       Renal Function:   Estimated Creatinine Clearance: 110.7 mL/min (based on SCr of 0.73 mg/dL).    CBC (last 72 hours):  Recent Labs   Lab Result Units 02/14/25  0343 02/15/25  0043 02/16/25  0129   WBC x10(3)/mcL 13.83* 12.47* 12.68*   Hgb g/dL 11.3* 10.9* 10.3*   Hct % 35.6* 33.5* 32.5*   Platelet x10(3)/mcL 348 370 404*   Mono % % 7.8 7.2 6.8   Eos % % 1.0 1.8 0.9   Basophil % % 0.4 0.4 0.5       Metabolic Panel (last 72 hours):  Recent Labs   Lab Result Units 02/14/25  0343 02/14/25  0611 02/15/25  0043 02/15/25  0538 02/16/25  0129 02/16/25  0310   Sodium mmol/L 141  --  145  --  144  --    Sodium, Blood Gas mmol/L  --  139  --  141  --  140   Potassium mmol/L 3.6  --  3.8  --  3.9  --    Potassium, Blood Gas mmol/L  --  3.6  --  3.5  --  4.0   Chloride mmol/L 107  --  110*  --  107  --    CO2 mmol/L 25  --  25  --  25  --    Glucose mg/dL 292*  --  218*  --  294*  --    Blood Urea Nitrogen mg/dL 33.3*  --  30.7*  --  29.2*  --    Creatinine mg/dL 0.76  --  0.72  --  0.73  --    Albumin g/dL 2.3*  --  2.3*  --  2.1*  --     Bilirubin Total mg/dL 0.4  --  0.4  --  0.4  --    ALP unit/L 86  --  73  --  82  --    AST unit/L 30  --  31  --  22  --    ALT unit/L 21  --  23  --  22  --      Microbiologic Results:  Microbiology Results (last 7 days)       Procedure Component Value Units Date/Time    Respiratory Culture [[id number]] Collected: 02/16/25 0900    Order Status: Sent Specimen: Respiratory from Trachael Aspirate Updated: 02/16/25 0957    Blood Culture [[id number]] Collected: 02/16/25 0846    Order Status: Resulted Specimen: Blood from Hand, Right Updated: 02/16/25 0908    Blood Culture [[id number]] Collected: 02/16/25 0852    Order Status: Resulted Specimen: Blood from Wrist, Left Updated: 02/16/25 0907    Blood Culture [[id number]]  (Normal) Collected: 02/10/25 1137    Order Status: Completed Specimen: Blood Updated: 02/15/25 1401     Blood Culture No Growth at 5 days    Blood Culture [[id number]]  (Normal) Collected: 02/10/25 1043    Order Status: Completed Specimen: Blood Updated: 02/15/25 1201     Blood Culture No Growth at 5 days    Respiratory Culture [[id number]]  (Abnormal)  (Susceptibility) Collected: 02/10/25 1057    Order Status: Completed Specimen: Sputum from Endotracheal Aspirate Updated: 02/12/25 1221     Respiratory Culture Moderate Enterobacter cloacae complex     Comment: with normal respiratory autumn        GRAM STAIN Quality 2+      Moderate Gram positive cocci      Few Gram Positive Rods    Tissue Culture - Aerobic [[id number]] Collected: 02/04/25 1613    Order Status: Completed Specimen: Bone from Skull Updated: 02/09/25 1112     Tissue - Aerobic Culture No Growth

## 2025-02-16 NOTE — PROGRESS NOTES
POD#12 right craniectomy for SDH  S/p trach and PEG  No sedation  Getting Ritalin and Amantadine  He is sitting up in bed, NAD    Temp Max 101.4/24 hrs, VSS  Pupils 3mm bilateral, reactive brisk  Alert, not really answering questions for me today  He is following commands in right UE and LE  Dressing c/d/I  Flap full but not tense  Drain site dry    Plan: No new recs from our standpoint  OK to leave incision open to air  Continue Q4 hour neuro exams  Continue BP parameters below 150/90  Keppra BID  SCDs and lovenox for DVT prophylaxis  Remove every other staple today, remaining staples on 2/18  CM for placement

## 2025-02-16 NOTE — PROGRESS NOTES
Trauma ICU Progress Note    Patient Information:   Patient Name: Ezequiel Ramires                   : 1960     MRN: [id number]   Date of Admission: 2025  Code Status: Full Code  Date of Exam: 2025  HD#12  POD#12 Days Post-Op  Attending Provider: Maik Moreno Jr., *  Admission Summary:   Patient is a 64 year old  male with PMH of CAD, HTN, DM, HLD, alcoholism, arthritis, anxiety, MI who presents as a transfer from [facility] [place] with bilateral SDH with 0.8 right to left midline shift, SAH, left occipital fracture, right 4th rib fracture. Report per wife patient was last seen normal around 1930 yesterday, she woke this morning to find him on the ground next to the bed with vomit on the floor, he was taken to the OSH at around 0500     Interval history:    NAEON. Had to be put on rate last night for desaturations.    Consults:   Consults: Neurosurgery, Cardiology Injuries:  1. Acute nondisplaced R 4th rib fracture  2. Tree-in-bud nodularities in the bilateral lower lobes which could be due to infectious bronchiolitis or aspiration   3. 4 mm anterolisthesis of C4 on C5 due to degeneration versus trauma  4. Nondisplaced left occipital bone fracture extending inferiorly to the level of the foramen magnum   5. Bilateral SDH  6. Bilateral IPH  7. Bilateral SAH  8. Small layering hemorrhage in the left occipital horn   9. 8 mm right to left midline shift    [x]Problems list reviewed  [x]Tertiary exam performed Operations/Procedures:  Crani 25  Percutaneous tracheostomy   PEG      Past medical history:  HTN  HLD  DMII  Psoriasis  CAD s/p stents  MI    Medications: [x] Medications reviewed/updated   Home Meds:    Current Outpatient Medications   Medication Instructions    amLODIPine (NORVASC) 5 MG tablet 1 tablet, Daily    aspirin (ECOTRIN) 81 MG EC tablet 1 tablet, Oral, Every morning    ezetimibe (ZETIA) 10 mg tablet 1 tablet, Daily    icosapent ethyL (VASCEPA) 2,000 mg, Nightly    losartan (COZAAR) 100  MG tablet 1 tablet, Nightly    metoprolol tartrate (LOPRESSOR) 50 MG tablet 1 tablet, 2 times daily    prasugreL HCl (EFFIENT) 10 mg, Daily    rosuvastatin (CRESTOR) 10 MG tablet 1 tablet, Nightly    semaglutide (OZEMPIC SUBQ) Subcutaneous      Scheduled Meds:    amantadine HCL  100 mg Per G Tube BID    amLODIPine  10 mg Per G Tube Daily    enoxparin  40 mg Subcutaneous Q12H (prophylaxis, 0900/2100)    famotidine  20 mg Per G Tube BID    folic acid  1 mg Per G Tube Daily    insulin glargine U-100  40 Units Subcutaneous BID    losartan  100 mg Per G Tube QHS    methocarbamoL  500 mg Per G Tube Q8H    methylphenidate HCl  20 mg Per G Tube BID WM    metoprolol tartrate  50 mg Per G Tube BID    multivitamin  1 tablet Per G Tube Daily    thiamine  100 mg Per G Tube Daily     Continuous Infusions:    D10W   Intravenous Continuous PRN         PRN Meds:   Current Facility-Administered Medications:     acetaminophen, 650 mg, Per G Tube, Q4H PRN    acetaminophen, 650 mg, Rectal, Q4H PRN    artificial tears, 1 drop, Both Eyes, PRN    bisacodyL, 10 mg, Rectal, Daily PRN    ceFAZolin (Ancef) IV (PEDS and ADULTS), 2 g, Intravenous, On Call Procedure    D10W, , Intravenous, Continuous PRN    dextrose 50%, 12.5 g, Intravenous, PRN    dextrose 50%, 25 g, Intravenous, PRN    enalaprilat, 1.25 mg, Intravenous, Q4H PRN    fentaNYL, 50 mcg, Intravenous, Q2H PRN    glucagon (human recombinant), 1 mg, Intramuscular, PRN    glucagon (human recombinant), 1 mg, Intramuscular, PRN    hydrALAZINE, 10 mg, Intravenous, Q4H PRN    insulin aspart U-100, 0-15 Units, Subcutaneous, Q6H PRN    labetaloL, 20 mg, Intravenous, Q4H PRN    LORazepam, 2 mg, Per G Tube, Q4H PRN    ondansetron, 4 mg, Intravenous, Q6H PRN    oxyCODONE, 5 mg, Per G Tube, Q4H PRN    prochlorperazine, 5 mg, Intravenous, Q6H PRN     Vitals:  VITAL SIGNS: 24 HR MIN & MAX LAST   Temp  Min: 98.5 °F (36.9 °C)  Max: 102.4 °F (39.1 °C)  100.2 °F (37.9 °C)   BP  Min: 115/69  Max: 153/88   "136/76    Pulse  Min: 84  Max: 112  102    Resp  Min: 22  Max: 39  (!) 30    SpO2  Min: 91 %  Max: 100 %  96 %      HT: 6' 0.01" (182.9 cm)  WT: 80 kg (176 lb 5.9 oz)  BMI: 23.9   Ideal Body Weight (IBW), Male: 178.06 lb  % Ideal Body Weight, Male (lb): 99.08 %        General  Exam: intubated      Neuro/Psych  GCS: 11T (E 4) (V T) (M 6)  Exam: Patient is following commands moving his toes for me and opening his eyes to command. Crani incision c/d/I Corbin drain +SS drainage  ICP monitor: No  ICP treatment: No  C-Collar: No    Plan:   Bilateral SDH s/p crani-  Keppra x 7 days, HOB > 30 degrees. BP<140.   Continue amantadine, ritalin   Stop Keppra   CTM     HEENT  Exam: NCAT. Trach collar in place.     Plan:   monitoring     Pulmonary  Vitals: Resp  Av.6  Min: 22  Max: 39  SpO2  Av.9 %  Min: 91 %  Max: 100 %    Ventilator/Oxygen Settings:   Vent Mode: CPAP / PSV  Vt Set: 500 mL  Set Rate: 6 BPM  Pressure Support: 10 cmH20  I:E Ratio Measured: 1:1.8  Total PEEP: 5 cmH20 Vent Mode: CPAP / PSV (25)  Ventilator Initiated: Yes (25 1240)  Set Rate: 6 BPM (25 0235)  Vt Set: 500 mL (25 0235)  Pressure Support: 10 cmH20 (25 0510)  PEEP/CPAP: 5 cmH20 (2510)  Oxygen Concentration (%): 30 (25 0510)  Peak Airway Pressure: 16 cmH20 (25 0510)  Total Ve: 13.6 L/m (25)  F/VT Ratio<105 (RSBI): (!) 74.42 (25 0510)        ABG:   Recent Labs   Lab 25  0310   PH 7.510*   PO2 72.0*   PCO2 34.0*   HCO3 27.1*        CXR:    X-Ray Chest 1 View for Line/Tube Placement    Result Date: 2025  PICC line insertion. Confluent opacities in the left retrocardiac region with silhouetting of the left hemidiaphragm which might be related to an infiltrate/atelectasis. No other change Electronically signed by: Serg Mera Date:    2025 Time:    11:33        Rib fractures: right rib fracture  Chest Tube: None     Exam: Coarse BS bilaterally on CPAP " "currently    Plan:     Trach collar today   3%saline/duonebs Q4  CTM  Incentive Spirometry/RT Treatments: none     Cardiovascular  Vitals: Pulse  Av.6  Min: 84  Max: 112  BP  Min: 115/69  Max: 153/88  No results for input(s): "TROPONINI", "CKTOTAL", "CKMB", "BNP" in the last 168 hours.  Vasoactive Agents: None  Exam: RRR    Plan:   HTN- continue home meds and prns     Renal  Recent Labs     02/14/25  0343 02/15/25  0043 02/16/25  0129   BUN 33.3* 30.7* 29.2*   CREATININE 0.76 0.72 0.73       No results for input(s): "LACTIC" in the last 72 hours.    Intake/Output - Last 3 Shifts          0700  02/15 0659 02/15 07 0659  07 0659    I.V. (mL/kg) 15 (0.2)      NG/GT 2201 2247     Total Intake(mL/kg) 2216 (27.7) 2247 (28.1)     Urine (mL/kg/hr) 1600 (0.8) 2550 (1.3)     Stool 400 0     Total Output 2000 2550     Net +216 -303            Urine Occurrence 1 x      Stool Occurrence 1 x 2 x              Intake/Output Summary (Last 24 hours) at 2025 0840  Last data filed at 2025 0600  Gross per 24 hour   Intake 2247 ml   Output 2550 ml   Net -303 ml         Garcia: no    Plan:   monitor     FEN/GI  Recent Labs     02/14/25  0343 02/15/25  0043 25  0129    145 144   K 3.6 3.8 3.9    110* 107   CO2  25   CALCIUM 8.8 8.3* 8.6*   ALBUMIN 2.3* 2.3* 2.1*   BILITOT 0.4 0.4 0.4   AST 30 31 22   ALKPHOS 86 73 82   ALT 21 23 22       Diet: NPO. Resume tube feeds s/p PEG.     Last BM: +BM    Abdominal Exam: s/NT/ND +BS. PEG tube in place.     Plan:   Tube feeds through PEG  Bowel regimen     Heme/Onc  Recent Labs     25  0343 02/15/25  0043 25  0129   HGB 11.3* 10.9* 10.3*   HCT 35.6* 33.5* 32.5*    370 404*       Transfusions (over past 24h): None    Plan:   monitor     ID  Temp  Av.4 °F (38 °C)  Min: 98.5 °F (36.9 °C)  Max: 102.4 °F (39.1 °C)      Recent Labs     25  0343 02/15/25  0043 25  0129   WBC 13.83* 12.47* 12.68* "       Cultures: Antibiotics:    BCX x2 (2/11) in process  RCX (2/11) Enterobacter 1. Cefepime       Plan:   Fever- will re-culture Blood and respiratory and start broad spectrum ABX     Endocrine  Recent Labs     02/14/25  0343 02/15/25  0043 02/16/25  0129   GLUCOSE 292* 218* 294*      Recent Labs     02/15/25  0401 02/15/25  0826 02/15/25  1101 02/15/25  1513 02/15/25  2029 02/16/25  0001 02/16/25  0356 02/16/25  0807   POCTGLUCOSE 186* 176* 210* 261* 258* 266* 265* 229*        Plan:   DMII-High sliding scale. Lantus 40 BID.  Insulin treatment: High sliding scale and Lantus 40 BID     Musculoskeletal  Weight bearing status:   RUE: WBAT  LUE: WBAT  RLE: WBAT  LLE: WBAT    Exam: does not have purposeful movement  Plan:   monitor     Wounds  Wounds exam: crani incision c/d/I RORO drain in place  Wound vac: No   Media: none  Plan: local wound care     Precautions  Precautions: Seizure     Prophylaxis  Seizure: Keppra completed  DVT: Lovenox  GI: H2B     Lines/drains/airway   Lines/Drains/Airways       Drain  Duration             Male External Urinary Catheter 02/06/25 2300 Small 9 days         Gastrostomy/Enterostomy 02/11/25 1017 LUQ 4 days              Airway  Duration             Adult Surgical Airway 02/11/25 0958 Shiley Cuffed 8.0 / 85 mm 4 days              Peripheral Intravenous Line  Duration                  Peripheral IV - Single Lumen 02/04/25 1730 20 G;18 G Right Antecubital 11 days         Peripheral IV - Single Lumen 02/08/25 1600 18 G Anterior;Right Upper Arm 7 days                    Plan:  Cont all lines    [x]LDA reviewed/updated      Restraints  Face to face evaluation of need for restraints on rounds today:   Currently restrained? No.        Assessment & Disposition:   Problem list:  Active Problem List with Overview Notes    Diagnosis Date Noted    Acute hypoxemic respiratory failure 02/12/2025    Dysphagia 02/12/2025    VAP (ventilator-associated pneumonia) 02/12/2025    Intraparenchymal hemorrhage  of brain 02/05/2025    IVH (intraventricular hemorrhage) 02/05/2025    SDH (subdural hematoma) 02/04/2025    SAH (subarachnoid hemorrhage) 02/04/2025    Closed fracture of one rib of right side 02/04/2025    Closed fracture of left side of occipital bone 02/04/2025       Cont ICU   Bilateral SDH s/p crani- Keppra x 7 days (stop 2/11), HOB > 30 degrees. BP<140. Continue amantadine, ritalin.  Wean back to trach collar today will start 3%saline/Duonebs Q4 for secretions   HTN-  continue home meds and PRNs  Fever- will re-culture Blood and resp. Borad spectrum Vanc/zosyn  Lovenox  PT/OT - high intensity therapy  DMII-increased to high sliding scale; latus 40 BID  Approved to [facility] waiting on insurance        Critical Care Time:   41 minutes of critical care was spent on this patient personally by me on the following activities: development of treatment plan with patient and bedside nurse, discussions with consultants, evaluation of patient's response to treatment, examining the patient, ordering and preforming treatments and interventions, ordering and reviewing laboratory studies, ordering and reviewing radiologic studies, and re-evaluation of patient's condition.     Daisy Moreno Jr., MD, MS  Trauma Critical Care Surgery  [facility]   02/16/2025

## 2025-02-17 LAB
ALBUMIN SERPL-MCNC: 2.1 G/DL (ref 3.4–4.8)
ALBUMIN/GLOB SERPL: 0.5 RATIO (ref 1.1–2)
ALLENS TEST BLOOD GAS (OHS): YES
ALP SERPL-CCNC: 75 UNIT/L (ref 40–150)
ALT SERPL-CCNC: 18 UNIT/L (ref 0–55)
ANION GAP SERPL CALC-SCNC: 6 MEQ/L
AST SERPL-CCNC: 18 UNIT/L (ref 5–34)
BASE EXCESS BLD CALC-SCNC: 4.1 MMOL/L (ref -2–2)
BASOPHILS # BLD AUTO: 0.09 X10(3)/MCL
BASOPHILS NFR BLD AUTO: 0.7 %
BILIRUB SERPL-MCNC: <0.5 MG/DL
BLOOD GAS SAMPLE TYPE (OHS): ABNORMAL
BUN SERPL-MCNC: 29.9 MG/DL (ref 8.4–25.7)
CA-I BLD-SCNC: 1.09 MMOL/L (ref 1.12–1.23)
CALCIUM SERPL-MCNC: 8.6 MG/DL (ref 8.8–10)
CHLORIDE SERPL-SCNC: 109 MMOL/L (ref 98–107)
CO2 BLDA-SCNC: 27.9 MMOL/L
CO2 SERPL-SCNC: 27 MMOL/L (ref 23–31)
COHGB MFR BLDA: 1.7 % (ref 0.5–1.5)
CPAP BLOOD GAS (OHS): 5 CM H2O
CREAT SERPL-MCNC: 0.71 MG/DL (ref 0.72–1.25)
CREAT/UREA NIT SERPL: 42
CRP SERPL-MCNC: 188.4 MG/L
DRAWN BY BLOOD GAS (OHS): ABNORMAL
EOSINOPHIL # BLD AUTO: 0.16 X10(3)/MCL (ref 0–0.9)
EOSINOPHIL NFR BLD AUTO: 1.3 %
ERYTHROCYTE [DISTWIDTH] IN BLOOD BY AUTOMATED COUNT: 14.6 % (ref 11.5–17)
GFR SERPLBLD CREATININE-BSD FMLA CKD-EPI: >60 ML/MIN/1.73/M2
GLOBULIN SER-MCNC: 4.2 GM/DL (ref 2.4–3.5)
GLUCOSE SERPL-MCNC: 262 MG/DL (ref 82–115)
HCO3 BLDA-SCNC: 26.9 MMOL/L (ref 22–26)
HCT VFR BLD AUTO: 31.9 % (ref 42–52)
HGB BLD-MCNC: 10.1 G/DL (ref 14–18)
IMM GRANULOCYTES # BLD AUTO: 0.08 X10(3)/MCL (ref 0–0.04)
IMM GRANULOCYTES NFR BLD AUTO: 0.7 %
INHALED O2 CONCENTRATION: 30 %
LYMPHOCYTES # BLD AUTO: 1.98 X10(3)/MCL (ref 0.6–4.6)
LYMPHOCYTES NFR BLD AUTO: 16.3 %
MCH RBC QN AUTO: 28.7 PG (ref 27–31)
MCHC RBC AUTO-ENTMCNC: 31.7 G/DL (ref 33–36)
MCV RBC AUTO: 90.6 FL (ref 80–94)
METHGB MFR BLDA: 1.4 % (ref 0.4–1.5)
MODE (OHS): ABNORMAL
MONOCYTES # BLD AUTO: 1.02 X10(3)/MCL (ref 0.1–1.3)
MONOCYTES NFR BLD AUTO: 8.4 %
NEUTROPHILS # BLD AUTO: 8.84 X10(3)/MCL (ref 2.1–9.2)
NEUTROPHILS NFR BLD AUTO: 72.6 %
NRBC BLD AUTO-RTO: 0 %
O2 HB BLOOD GAS (OHS): 96.6 % (ref 94–97)
OHS QRS DURATION: 82 MS
OHS QTC CALCULATION: 445 MS
OXYGEN DEVICE BLOOD GAS (OHS): ABNORMAL
OXYHGB MFR BLDA: 10.5 G/DL (ref 12–16)
PCO2 BLDA: 33 MMHG (ref 35–45)
PH BLDA: 7.52 [PH] (ref 7.35–7.45)
PLATELET # BLD AUTO: 411 X10(3)/MCL (ref 130–400)
PMV BLD AUTO: 11.3 FL (ref 7.4–10.4)
PO2 BLDA: 112 MMHG (ref 80–100)
POCT GLUCOSE: 166 MG/DL (ref 70–110)
POCT GLUCOSE: 179 MG/DL (ref 70–110)
POCT GLUCOSE: 187 MG/DL (ref 70–110)
POCT GLUCOSE: 226 MG/DL (ref 70–110)
POCT GLUCOSE: 228 MG/DL (ref 70–110)
POTASSIUM BLOOD GAS (OHS): 4.1 MMOL/L (ref 3.5–5)
POTASSIUM SERPL-SCNC: 4.1 MMOL/L (ref 3.5–5.1)
PREALB SERPL-MCNC: 12.1 MG/DL (ref 16–42)
PROT SERPL-MCNC: 6.3 GM/DL (ref 5.8–7.6)
PS (OHS): 10 CMH2O
RBC # BLD AUTO: 3.52 X10(6)/MCL (ref 4.7–6.1)
SAMPLE SITE BLOOD GAS (OHS): ABNORMAL
SAO2 % BLDA: 98.8 %
SODIUM BLOOD GAS (OHS): 140 MMOL/L (ref 137–145)
SODIUM SERPL-SCNC: 142 MMOL/L (ref 136–145)
WBC # BLD AUTO: 12.17 X10(3)/MCL (ref 4.5–11.5)

## 2025-02-17 PROCEDURE — 85025 COMPLETE CBC W/AUTO DIFF WBC: CPT

## 2025-02-17 PROCEDURE — 25000003 PHARM REV CODE 250: Performed by: SURGERY

## 2025-02-17 PROCEDURE — 63600175 PHARM REV CODE 636 W HCPCS

## 2025-02-17 PROCEDURE — 94640 AIRWAY INHALATION TREATMENT: CPT

## 2025-02-17 PROCEDURE — 99900035 HC TECH TIME PER 15 MIN (STAT)

## 2025-02-17 PROCEDURE — 84134 ASSAY OF PREALBUMIN: CPT

## 2025-02-17 PROCEDURE — 99900031 HC PATIENT EDUCATION (STAT)

## 2025-02-17 PROCEDURE — 97535 SELF CARE MNGMENT TRAINING: CPT | Mod: CO

## 2025-02-17 PROCEDURE — 94760 N-INVAS EAR/PLS OXIMETRY 1: CPT | Mod: XB

## 2025-02-17 PROCEDURE — 63600175 PHARM REV CODE 636 W HCPCS: Performed by: SURGERY

## 2025-02-17 PROCEDURE — 36415 COLL VENOUS BLD VENIPUNCTURE: CPT

## 2025-02-17 PROCEDURE — 25000003 PHARM REV CODE 250

## 2025-02-17 PROCEDURE — 94003 VENT MGMT INPAT SUBQ DAY: CPT

## 2025-02-17 PROCEDURE — 25000242 PHARM REV CODE 250 ALT 637 W/ HCPCS: Performed by: SURGERY

## 2025-02-17 PROCEDURE — 86140 C-REACTIVE PROTEIN: CPT

## 2025-02-17 PROCEDURE — 82803 BLOOD GASES ANY COMBINATION: CPT

## 2025-02-17 PROCEDURE — 94761 N-INVAS EAR/PLS OXIMETRY MLT: CPT

## 2025-02-17 PROCEDURE — 80053 COMPREHEN METABOLIC PANEL: CPT

## 2025-02-17 PROCEDURE — 97110 THERAPEUTIC EXERCISES: CPT | Mod: CQ

## 2025-02-17 PROCEDURE — 93005 ELECTROCARDIOGRAM TRACING: CPT

## 2025-02-17 PROCEDURE — 93010 ELECTROCARDIOGRAM REPORT: CPT | Mod: ,,, | Performed by: STUDENT IN AN ORGANIZED HEALTH CARE EDUCATION/TRAINING PROGRAM

## 2025-02-17 PROCEDURE — 27200966 HC CLOSED SUCTION SYSTEM

## 2025-02-17 PROCEDURE — 99291 CRITICAL CARE FIRST HOUR: CPT | Mod: ,,, | Performed by: SURGERY

## 2025-02-17 PROCEDURE — 36600 WITHDRAWAL OF ARTERIAL BLOOD: CPT

## 2025-02-17 PROCEDURE — 20800000 HC ICU TRAUMA

## 2025-02-17 PROCEDURE — 97530 THERAPEUTIC ACTIVITIES: CPT | Mod: CQ

## 2025-02-17 PROCEDURE — 27100171 HC OXYGEN HIGH FLOW UP TO 24 HOURS

## 2025-02-17 PROCEDURE — 99900026 HC AIRWAY MAINTENANCE (STAT)

## 2025-02-17 RX ADMIN — INSULIN ASPART 6 UNITS: 100 INJECTION, SOLUTION INTRAVENOUS; SUBCUTANEOUS at 11:02

## 2025-02-17 RX ADMIN — IPRATROPIUM BROMIDE AND ALBUTEROL SULFATE 3 ML: 2.5; .5 SOLUTION RESPIRATORY (INHALATION) at 12:02

## 2025-02-17 RX ADMIN — METOPROLOL TARTRATE 50 MG: 50 TABLET, FILM COATED ORAL at 08:02

## 2025-02-17 RX ADMIN — SODIUM CHLORIDE SOLN NEBU 3% 2 ML: 3 NEBU SOLN at 07:02

## 2025-02-17 RX ADMIN — VANCOMYCIN HYDROCHLORIDE 1500 MG: 1.5 INJECTION, POWDER, LYOPHILIZED, FOR SOLUTION INTRAVENOUS at 09:02

## 2025-02-17 RX ADMIN — ENOXAPARIN SODIUM 40 MG: 40 INJECTION SUBCUTANEOUS at 08:02

## 2025-02-17 RX ADMIN — AMANTADINE HYDROCHLORIDE 100 MG: 50 SOLUTION ORAL at 08:02

## 2025-02-17 RX ADMIN — INSULIN ASPART 6 UNITS: 100 INJECTION, SOLUTION INTRAVENOUS; SUBCUTANEOUS at 04:02

## 2025-02-17 RX ADMIN — SODIUM CHLORIDE SOLN NEBU 3% 2 ML: 3 NEBU SOLN at 04:02

## 2025-02-17 RX ADMIN — METHYLPHENIDATE HYDROCHLORIDE 20 MG: 5 TABLET ORAL at 04:02

## 2025-02-17 RX ADMIN — THERA TABS 1 TABLET: TAB at 08:02

## 2025-02-17 RX ADMIN — PIPERACILLIN SODIUM AND TAZOBACTAM SODIUM 4.5 G: 4; .5 INJECTION, POWDER, LYOPHILIZED, FOR SOLUTION INTRAVENOUS at 04:02

## 2025-02-17 RX ADMIN — THIAMINE HCL TAB 100 MG 100 MG: 100 TAB at 08:02

## 2025-02-17 RX ADMIN — FOLIC ACID 1 MG: 1 TABLET ORAL at 08:02

## 2025-02-17 RX ADMIN — FAMOTIDINE 20 MG: 20 TABLET, FILM COATED ORAL at 08:02

## 2025-02-17 RX ADMIN — PIPERACILLIN SODIUM AND TAZOBACTAM SODIUM 4.5 G: 4; .5 INJECTION, POWDER, LYOPHILIZED, FOR SOLUTION INTRAVENOUS at 08:02

## 2025-02-17 RX ADMIN — IPRATROPIUM BROMIDE AND ALBUTEROL SULFATE 3 ML: 2.5; .5 SOLUTION RESPIRATORY (INHALATION) at 11:02

## 2025-02-17 RX ADMIN — ACETAMINOPHEN 650 MG: 650 SOLUTION ORAL at 11:02

## 2025-02-17 RX ADMIN — INSULIN ASPART 3 UNITS: 100 INJECTION, SOLUTION INTRAVENOUS; SUBCUTANEOUS at 08:02

## 2025-02-17 RX ADMIN — IPRATROPIUM BROMIDE AND ALBUTEROL SULFATE 3 ML: 2.5; .5 SOLUTION RESPIRATORY (INHALATION) at 07:02

## 2025-02-17 RX ADMIN — SODIUM CHLORIDE SOLN NEBU 3% 2 ML: 3 NEBU SOLN at 08:02

## 2025-02-17 RX ADMIN — AMLODIPINE BESYLATE 10 MG: 5 TABLET ORAL at 08:02

## 2025-02-17 RX ADMIN — IPRATROPIUM BROMIDE AND ALBUTEROL SULFATE 3 ML: 2.5; .5 SOLUTION RESPIRATORY (INHALATION) at 03:02

## 2025-02-17 RX ADMIN — LOSARTAN POTASSIUM 100 MG: 50 TABLET, FILM COATED ORAL at 08:02

## 2025-02-17 RX ADMIN — INSULIN GLARGINE 40 UNITS: 100 INJECTION, SOLUTION SUBCUTANEOUS at 08:02

## 2025-02-17 RX ADMIN — INSULIN ASPART 3 UNITS: 100 INJECTION, SOLUTION INTRAVENOUS; SUBCUTANEOUS at 04:02

## 2025-02-17 RX ADMIN — METHOCARBAMOL 500 MG: 500 TABLET ORAL at 09:02

## 2025-02-17 RX ADMIN — METHYLPHENIDATE HYDROCHLORIDE 20 MG: 5 TABLET ORAL at 08:02

## 2025-02-17 RX ADMIN — SODIUM CHLORIDE SOLN NEBU 3% 2 ML: 3 NEBU SOLN at 11:02

## 2025-02-17 RX ADMIN — IPRATROPIUM BROMIDE AND ALBUTEROL SULFATE 3 ML: 2.5; .5 SOLUTION RESPIRATORY (INHALATION) at 08:02

## 2025-02-17 RX ADMIN — IPRATROPIUM BROMIDE AND ALBUTEROL SULFATE 3 ML: 2.5; .5 SOLUTION RESPIRATORY (INHALATION) at 04:02

## 2025-02-17 RX ADMIN — SODIUM CHLORIDE SOLN NEBU 3% 2 ML: 3 NEBU SOLN at 03:02

## 2025-02-17 RX ADMIN — SODIUM CHLORIDE SOLN NEBU 3% 2 ML: 3 NEBU SOLN at 12:02

## 2025-02-17 RX ADMIN — OXYCODONE HYDROCHLORIDE 5 MG: 5 TABLET ORAL at 10:02

## 2025-02-17 NOTE — PROGRESS NOTES
Trauma ICU Progress Note    Patient Information:   Patient Name: Ezequiel Ramires                   : 1960     MRN: [id number]   Date of Admission: 2025  Code Status: Full Code  Date of Exam: 2025  HD#13  POD#13 Days Post-Op  Attending Provider: Chinmay Cope MD  Admission Summary:   Patient is a 64 year old  male with PMH of CAD, HTN, DM, HLD, alcoholism, arthritis, anxiety, MI who presents as a transfer from [facility] with bilateral SDH with 0.8 right to left midline shift, SAH, left occipital fracture, right 4th rib fracture. Report per wife patient was last seen normal around 1930 yesterday, she woke this morning to find him on the ground next to the bed with vomit on the floor, he was taken to the OSH at around 0500     Interval history:    NAEON. Put back on cpap yesterday evening. Need to push a little harder otherwise ok    Consults:   Consults: Neurosurgery, Cardiology Injuries:  1. Acute nondisplaced R 4th rib fracture  2. Tree-in-bud nodularities in the bilateral lower lobes which could be due to infectious bronchiolitis or aspiration   3. 4 mm anterolisthesis of C4 on C5 due to degeneration versus trauma  4. Nondisplaced left occipital bone fracture extending inferiorly to the level of the foramen magnum   5. Bilateral SDH  6. Bilateral IPH  7. Bilateral SAH  8. Small layering hemorrhage in the left occipital horn   9. 8 mm right to left midline shift    [x]Problems list reviewed  [x]Tertiary exam performed Operations/Procedures:  Crani 25  Percutaneous tracheostomy   PEG      Past medical history:  HTN  HLD  DMII  Psoriasis  CAD s/p stents  MI    Medications: [x] Medications reviewed/updated   Home Meds:    Current Outpatient Medications   Medication Instructions    amLODIPine (NORVASC) 5 MG tablet 1 tablet, Daily    aspirin (ECOTRIN) 81 MG EC tablet 1 tablet, Oral, Every morning    ezetimibe (ZETIA) 10 mg tablet 1 tablet, Daily    icosapent ethyL (VASCEPA) 2,000 mg, Nightly     losartan (COZAAR) 100 MG tablet 1 tablet, Nightly    metoprolol tartrate (LOPRESSOR) 50 MG tablet 1 tablet, 2 times daily    prasugreL HCl (EFFIENT) 10 mg, Daily    rosuvastatin (CRESTOR) 10 MG tablet 1 tablet, Nightly    semaglutide (OZEMPIC SUBQ) Subcutaneous      Scheduled Meds:    albuterol-ipratropium  3 mL Nebulization Q4H    amantadine HCL  100 mg Per G Tube BID    amLODIPine  10 mg Per G Tube Daily    enoxparin  40 mg Subcutaneous Q12H (prophylaxis, 0900/2100)    famotidine  20 mg Per G Tube BID    folic acid  1 mg Per G Tube Daily    insulin glargine U-100  40 Units Subcutaneous BID    losartan  100 mg Per G Tube QHS    methocarbamoL  500 mg Per G Tube Q8H    methylphenidate HCl  20 mg Per G Tube BID WM    metoprolol tartrate  50 mg Per G Tube BID    multivitamin  1 tablet Per G Tube Daily    piperacillin-tazobactam (Zosyn) IV (PEDS and ADULTS) (extended infusion is not appropriate)  4.5 g Intravenous Q8H    sodium chloride 3%  2 mL Nebulization Q4H    thiamine  100 mg Per G Tube Daily    vancomycin 1,500 mg in sodium chloride 0.9% 250 mL IVPB (admixture device)  1,500 mg Intravenous Q12H     Continuous Infusions:    D10W   Intravenous Continuous PRN         PRN Meds:   Current Facility-Administered Medications:     acetaminophen, 650 mg, Per G Tube, Q4H PRN    acetaminophen, 650 mg, Rectal, Q4H PRN    artificial tears, 1 drop, Both Eyes, PRN    bisacodyL, 10 mg, Rectal, Daily PRN    ceFAZolin (Ancef) IV (PEDS and ADULTS), 2 g, Intravenous, On Call Procedure    D10W, , Intravenous, Continuous PRN    dextrose 50%, 12.5 g, Intravenous, PRN    dextrose 50%, 25 g, Intravenous, PRN    enalaprilat, 1.25 mg, Intravenous, Q4H PRN    fentaNYL, 50 mcg, Intravenous, Q2H PRN    glucagon (human recombinant), 1 mg, Intramuscular, PRN    glucagon (human recombinant), 1 mg, Intramuscular, PRN    hydrALAZINE, 10 mg, Intravenous, Q4H PRN    insulin aspart U-100, 0-15 Units, Subcutaneous, Q6H PRN    labetaloL, 20 mg,  "Intravenous, Q4H PRN    LORazepam, 2 mg, Per G Tube, Q4H PRN    ondansetron, 4 mg, Intravenous, Q6H PRN    oxyCODONE, 5 mg, Per G Tube, Q4H PRN    prochlorperazine, 5 mg, Intravenous, Q6H PRN    Pharmacy to dose Vancomycin consult, , , Once **AND** vancomycin - pharmacy to dose, , Intravenous, pharmacy to manage frequency     Vitals:  VITAL SIGNS: 24 HR MIN & MAX LAST   Temp  Min: 98.7 °F (37.1 °C)  Max: 102.4 °F (39.1 °C)  (!) 101.6 °F (38.7 °C)   BP  Min: 101/63  Max: 151/87  119/70    Pulse  Min: 87  Max: 113  100    Resp  Min: 28  Max: 46  (!) 35    SpO2  Min: 94 %  Max: 99 %  99 %      HT: 6' 0.01" (182.9 cm)  WT: 80 kg (176 lb 5.9 oz)  BMI: 23.9   Ideal Body Weight (IBW), Male: 178.06 lb  % Ideal Body Weight, Male (lb): 99.08 %        General  Exam: intubated      Neuro/Psych  GCS: 11T (E 4) (V T) (M 6)  Exam: Patient is following commands moving his toes for me and opening his eyes to command. Crani incision c/d/I Corbin drain +SS drainage  ICP monitor: No  ICP treatment: No  C-Collar: No    Plan:   Bilateral SDH s/p crani-  Keppra x 7 days, HOB > 30 degrees. BP<140.   Continue amantadine, ritalin   Stop Keppra   CTM     HEENT  Exam: NCAT. Trach collar in place.     Plan:   monitoring     Pulmonary  Vitals: Resp  Av.2  Min: 28  Max: 46  SpO2  Av.5 %  Min: 94 %  Max: 99 %    Ventilator/Oxygen Settings:   Vent Mode: CPAP PSV  Vt Set: 500 mL  Set Rate: 6 BPM  Pressure Support: 10 cmH20  I:E Ratio Measured: 1:2.5  Total PEEP: 5 cmH20 Vent Mode: CPAP PSV (25 0500)  Ventilator Initiated: Yes (25 1240)  Set Rate: 6 BPM (25 0235)  Vt Set: 500 mL (25 0235)  Pressure Support: 10 cmH20 (25 0500)  PEEP/CPAP: 5 cmH20 (25 0500)  Oxygen Concentration (%): 30 (25 0800)  Peak Airway Pressure: 16 cmH20 (25 0500)  Total Ve: 17.9 L/m (25 0500)  F/VT Ratio<105 (RSBI): (!) 93.67 (25 0320)        ABG:   Recent Labs   Lab 25  06   PH 7.520*   PO2 112.0* " "  PCO2 33.0*   HCO3 26.9*        CXR:    X-Ray Chest 1 View for Line/Tube Placement    Result Date: 2025  PICC line insertion. Confluent opacities in the left retrocardiac region with silhouetting of the left hemidiaphragm which might be related to an infiltrate/atelectasis. No other change Electronically signed by: Serg Mera Date:    2025 Time:    11:33        Rib fractures: right rib fracture  Chest Tube: None     Exam: Coarse BS bilaterally on CPAP currently    Plan:     Trach collar today   3%saline/duonebs Q4  CTM  Incentive Spirometry/RT Treatments: none     Cardiovascular  Vitals: Pulse  Av.2  Min: 87  Max: 113  BP  Min: 101/63  Max: 151/87  No results for input(s): "TROPONINI", "CKTOTAL", "CKMB", "BNP" in the last 168 hours.  Vasoactive Agents: None  Exam: RRR    Plan:   HTN- continue home meds and prns     Renal  Recent Labs     02/15/25  0043 02/16/25  0129 02/17/25  0329   BUN 30.7* 29.2* 29.9*   CREATININE 0.72 0.73 0.71*       No results for input(s): "LACTIC" in the last 72 hours.    Intake/Output - Last 3 Shifts         02/15 07 0659  07 0659  07 0659    I.V. (mL/kg)       NG/GT 2247 630     IV Piggyback  961.3     Total Intake(mL/kg) 2247 (28.1) 1591.3 (19.9)     Urine (mL/kg/hr) 2550 (1.3) 1650 (0.9)     Stool 0 0     Total Output 2550 1650     Net -303 -58.7            Urine Occurrence  1 x     Stool Occurrence 2 x 3 x              Intake/Output Summary (Last 24 hours) at 2025 0820  Last data filed at 2025 0545  Gross per 24 hour   Intake 1591.31 ml   Output 1650 ml   Net -58.69 ml         Garcia: no    Plan:   monitor     FEN/GI  Recent Labs     02/15/25  0043 02/16/25  0129 02/17/25  0329    144 142   K 3.8 3.9 4.1   * 107 109*   CO2 25 25 27   CALCIUM 8.3* 8.6* 8.6*   ALBUMIN 2.3* 2.1* 2.1*   BILITOT 0.4 0.4 <0.5   AST 31 22 18   ALKPHOS 73 82 75   ALT 23 22 18       Diet: NPO. Resume tube feeds s/p PEG.     Last " BM: +BM    Abdominal Exam: s/NT/ND +BS. PEG tube in place.     Plan:   Tube feeds through PEG  Bowel regimen     Heme/Onc  Recent Labs     02/15/25  0043 25  0129 25  0329   HGB 10.9* 10.3* 10.1*   HCT 33.5* 32.5* 31.9*    404* 411*       Transfusions (over past 24h): None    Plan:   monitor     ID  Temp  Av.1 °F (37.8 °C)  Min: 98.7 °F (37.1 °C)  Max: 102.4 °F (39.1 °C)      Recent Labs     02/15/25  0043 25  0129 25  0329   WBC 12.47* 12.68* 12.17*       Cultures: Antibiotics:    BCX x2 () in process  RCX () Enterobacter 1. Cefepime       Plan:   Fever- will re-culture Blood and respiratory and start broad spectrum ABX     Endocrine  Recent Labs     02/15/25  0043 25  0129 25  0329   GLUCOSE 218* 294* 262*      Recent Labs     25  0001 25  0356 25  0807 25  1127 25  1603 25  0401 25  0749   POCTGLUCOSE 266* 265* 229* 274* 173* 286* 228* 166*        Plan:   DMII-High sliding scale. Lantus 40 BID.  Insulin treatment: High sliding scale and Lantus 40 BID     Musculoskeletal  Weight bearing status:   RUE: WBAT  LUE: WBAT  RLE: WBAT  LLE: WBAT    Exam: does not have purposeful movement  Plan:   monitor     Wounds  Wounds exam: crani incision c/d/I RORO drain in place  Wound vac: No   Media: none  Plan: local wound care     Precautions  Precautions: Seizure     Prophylaxis  Seizure: Keppra completed  DVT: Lovenox  GI: H2B     Lines/drains/airway   Lines/Drains/Airways       Drain  Duration             Male External Urinary Catheter 25 2300 Small 10 days         Gastrostomy/Enterostomy 25 1017 LUQ 5 days              Airway  Duration             Adult Surgical Airway 25 0958 Shiley Cuffed 8.0 / 85 mm 5 days              Peripheral Intravenous Line  Duration                  Peripheral IV - Single Lumen 25 1730 20 G;18 G Right Antecubital 12 days         Peripheral IV - Single Lumen  02/08/25 1600 18 G Anterior;Right Upper Arm 8 days                    Plan:  Cont all lines    [x]LDA reviewed/updated      Restraints  Face to face evaluation of need for restraints on rounds today:   Currently restrained? No.        Assessment & Disposition:   Problem list:  Active Problem List with Overview Notes    Diagnosis Date Noted    Acute hypoxemic respiratory failure 02/12/2025    Dysphagia 02/12/2025    VAP (ventilator-associated pneumonia) 02/12/2025    Intraparenchymal hemorrhage of brain 02/05/2025    IVH (intraventricular hemorrhage) 02/05/2025    SDH (subdural hematoma) 02/04/2025    SAH (subarachnoid hemorrhage) 02/04/2025    Closed fracture of one rib of right side 02/04/2025    Closed fracture of left side of occipital bone 02/04/2025       Cont ICU   Bilateral SDH s/p crani- Keppra x 7 days (stop 2/11), HOB > 30 degrees. BP<140. Continue amantadine, ritalin.  Wean back to trach collar today will start 3%saline/Duonebs Q4 for secretions   HTN-  continue home meds and PRNs  Fever- will re-culture Blood and resp. Borad spectrum Vanc/zosyn  Lovenox  PT/OT - high intensity therapy  DMII-increased to high sliding scale; latus 40 BID  Approved to [facility] waiting on insurance        Critical Care Time:   41 minutes of critical care was spent on this patient personally by me on the following activities: development of treatment plan with patient and bedside nurse, discussions with consultants, evaluation of patient's response to treatment, examining the patient, ordering and preforming treatments and interventions, ordering and reviewing laboratory studies, ordering and reviewing radiologic studies, and re-evaluation of patient's condition.     Daisy Moreno Jr., MD, MS  Trauma Critical Care Surgery  [facility]   02/17/2025

## 2025-02-17 NOTE — PT/OT/SLP PROGRESS
Physical Therapy Treatment    Patient Name:  Ezequiel Ramires   MRN:  [id number]    Recommendations:     Discharge therapy intensity: High Intensity Therapy   Discharge Equipment Recommendations:  (TBD)  Barriers to discharge: Impaired mobility, Ongoing medical needs, and placement    Assessment:     Ezequiel Ramires is a 65 y.o. male admitted with a medical diagnosis of B SDH s/p R craniectomy, SAH, IPH, L occipital bone fx, R 4th rib fx. Patient developed ST with bigeminy; is now s/p trach and PEG placement.  He presents with the following impairments/functional limitations: weakness, impaired self care skills, impaired functional mobility, decreased lower extremity function, impaired endurance, impaired balance.    Rehab Prognosis: Good; patient would benefit from acute skilled PT services to address these deficits and reach maximum level of function.    Recent Surgery: Procedure(s) (LRB):  CRANIOTOMY, FOR SUBDURAL HEMATOMA EVACUATION (Right) 13 Days Post-Op    Plan:     During this hospitalization, patient would benefit from acute PT services 6 x/week to address the identified rehab impairments via therapeutic activities, therapeutic exercises and progress toward the following goals:    Plan of Care Expires:  03/08/25    Subjective     Chief Complaint: n/a  Patient/Family Comments/goals:   Pain/Comfort:         Objective:     Communicated with RN prior to session.  Patient found supine with blood pressure cuff, PEG Tube, PureWick, peripheral IV, PRAFO, pulse ox (continuous), telemetry, SCD, Tracheostomy upon PT entry to room.     General Precautions: Standard, fall (BP < 150/90, R bone flap pxns)  Orthopedic Precautions: N/A  Braces:  (helmet)  Respiratory Status:  trach 5L  Blood Pressure: 120/68  Skin Integrity: Visible skin intact    Functional Mobility:  Bed Mobility:     Rolling Left:  maximal assistance  Rolling Right: maximal assistance  Supine to Sit: maximal assistance and of 2 persons  Transfers:     Bed to Chair:  maximal assistance and of 2 persons with  no AD  using  Squat Pivot  Balance: maxA for trunk/ head control    Therapeutic Activities/Exercises:  Rolling R<>L prior to ax 2/2 +BM; increased time for pericare; pt able to follow all commands  Pt sat EOB for ~10min maxA for static seated balance  AAROM BLE LAQ x 6; pt able to initiate movement L>R    Education:  Patient and spouse were provided with verbal education education regarding PT role/goals/POC, fall prevention, and safety awareness.  Understanding was verbalized, however additional teaching warranted.     Patient left up in chair with all lines intact, call button in reach, patience pad in place, and RN notified    GOALS:   Multidisciplinary Problems       Physical Therapy Goals          Problem: Physical Therapy    Goal Priority Disciplines Outcome Interventions   Physical Therapy Goal     PT, PT/OT Progressing    Description: Goals to be met by: d/c     Patient will increase functional independence with mobility by performin. Supine to sit with MInimal Assistance  2. Sit to supine with MInimal Assistance  3. Sit to stand transfer with Minimal Assistance  4. Bed to chair transfer with Minimal Assistance using LRAD vs pivot  5. 10' with B HHA MaxA                          Time Tracking:     PT Received On: 25  PT Start Time: 938     PT Stop Time: 1021  PT Total Time (min): 43 min     Billable Minutes: Therapeutic Activity 2 and Therapeutic Exercise 1    Treatment Type: Treatment  PT/PTA: PTA     Number of PTA visits since last PT visit: 2     2025

## 2025-02-17 NOTE — PT/OT/SLP PROGRESS
Occupational Therapy   Treatment    Name: Ezequiel Ramires  MRN: [id number]  Admitting Diagnosis:  SDH (subdural hematoma)  13 Days Post-Op    Recommendations:     Recommended therapy intensity at discharge: High Intensity Therapy   Discharge Equipment Recommendations:  to be determined by next level of care  Barriers to discharge:       Assessment:     Ezequiel Ramires is a 65 y.o. male with a medical diagnosis of SDH (subdural hematoma).  Performance deficits affecting function are weakness, impaired endurance, impaired self care skills, impaired sensation, impaired functional mobility, impaired balance, visual deficits, decreased upper extremity function, decreased lower extremity function, decreased coordination, decreased safety awareness, decreased ROM.     Rehab Prognosis:  Good; patient would benefit from acute skilled OT services to address these deficits and reach maximum level of function.       Plan:     Patient to be seen 5 x/week to address the above listed problems via self-care/home management, therapeutic activities, therapeutic exercises, neuromuscular re-education  Plan of Care Expires: 03/07/25  Plan of Care Reviewed with: patient, spouse    Subjective     Pain/Comfort:       Objective:     Communicated with: RN prior to session.  Patient found HOB elevated with   upon OT entry to room.    General Precautions: Standard, fall (SBP < 140, R bone flap pxs)    Orthopedic Precautions:N/A  Braces:  (helmet)  Respiratory Status:  trach collar 5L  Vital Signs: Blood Pressure: 131/71  HR: 96  Sp02: 97     Occupational Performance: Helmet donned prior to mobilization    Bed Mobility:    Patient completed Rolling/Turning to Left with  total assistance  Patient completed Rolling/Turning to Right with total assistance  Patient completed Supine to Sit with total assistance     Functional Mobility/Transfers:  Patient completed Bed <> Chair Transfer using Squat Pivot technique with total assistance and of 2 persons with no  assistive device  Functional Mobility: patient t/f from EOB to bedside chair via squat pivot this date    Activities of Daily Living:  Grooming: total assistance to wash face requiring [place] A  Upper Body Dressing: total assistance donning/doffing gown  Toileting: total assistance pericare following +BM performed in sidelying    Therapeutic Activities:  Patient answering yes/no questions via head shake/nodd and was noted to attempt to mouth words. He was able to initiate thumbs up with B UE semi-supine and EOB. He was able to shrug B shoulders this date seated EOB. He was able to initiate activity to grasp of towel to wash face however unable to fully grasp and bring to face.     Therapeutic Positioning    OT interventions performed during the course of today's session in an effort to prevent and/or reduce acquired pressure injuries:   Therapeutic positioning was provided at the conclusion of session to offload all bony prominences for the prevention and/or reduction of pressure injuries    Patient Education:  Patient and spouse were provided with verbal education education regarding OT role/goals/POC.  Additional teaching is warranted.      Patient left up in chair with all lines intact, call button in reach, and RN notified.    GOALS:   Multidisciplinary Problems       Occupational Therapy Goals          Problem: Occupational Therapy    Goal Priority Disciplines Outcome Interventions   Occupational Therapy Goal     OT, PT/OT Progressing    Description: LTG: Pt will perform basic ADLs and ADL t/fs with min A using LRAD by d/c.    STG: to be met by 3/7/25  1. Pt will follow 100% of simple one step commands across 3 sessions  2. Pt will perform grooming EOB with min A.   3. Pt will perform functional grasp/reach/release of items >80% of trials in prep for increased participation in ADL tasks.   4. Pt will perform sit<>stand with mod A in prep for ADL t/fs.  5. Pt will perform bedside commode transfer with mod A.                          Time Tracking:     OT Date of Treatment: 02/17/25  OT Start Time: 0938  OT Stop Time: 1021  OT Total Time (min): 43 min    Billable Minutes:Self Care/Home Management 3    OT/WENDI: WENDI     Number of WENDI visits since last OT visit: 2    2/17/2025

## 2025-02-17 NOTE — PLAN OF CARE
Wean back to trach collar, placed on CPAP last noc  Temp 2/16 1625 102.4   Temp 2/17 0800 101.6  Blood cx pending   Update sent to [facility]

## 2025-02-17 NOTE — PROGRESS NOTES
POD#13 right craniectomy for SDH  S/p trach and PEG  No sedation  Getting Ritalin and Amantadine  He is sitting up in bed, NAD    Temp Max 102.4/24 hrs, VSS  Pupils 3mm bilateral, reactive brisk  Alert, mouthing words   He is following commands in right UE and LE  Dressing c/d/I  Flap full but not tense  Drain site dry    CT head today:  Impression:       Overall decreasing intracranial hemorrhages and mass effect.     Plan: No new recs from our standpoint  OK to leave incision open to air  Continue Q4 hour neuro exams  Continue BP parameters below 150/90  Keppra BID  SCDs and lovenox for DVT prophylaxis  Remove remaining staples tomorrow, 2/18  CM for placement; ok to transfer when accepted  F/u outpatient in 6-8 weeks  We will sign off. Please call with any questions.

## 2025-02-18 LAB
ALBUMIN SERPL-MCNC: 2 G/DL (ref 3.4–4.8)
ALBUMIN/GLOB SERPL: 0.6 RATIO (ref 1.1–2)
ALLENS TEST BLOOD GAS (OHS): YES
ALP SERPL-CCNC: 67 UNIT/L (ref 40–150)
ALT SERPL-CCNC: 19 UNIT/L (ref 0–55)
ANION GAP SERPL CALC-SCNC: 9 MEQ/L
AST SERPL-CCNC: 19 UNIT/L (ref 5–34)
BASE EXCESS BLD CALC-SCNC: 4.1 MMOL/L (ref -2–2)
BASOPHILS # BLD AUTO: 0.06 X10(3)/MCL
BASOPHILS NFR BLD AUTO: 0.7 %
BILIRUB SERPL-MCNC: 0.4 MG/DL
BLOOD GAS SAMPLE TYPE (OHS): ABNORMAL
BUN SERPL-MCNC: 30.2 MG/DL (ref 8.4–25.7)
CA-I BLD-SCNC: 1.15 MMOL/L (ref 1.12–1.23)
CALCIUM SERPL-MCNC: 8 MG/DL (ref 8.8–10)
CHLORIDE SERPL-SCNC: 109 MMOL/L (ref 98–107)
CO2 BLDA-SCNC: 29.8 MMOL/L
CO2 SERPL-SCNC: 25 MMOL/L (ref 23–31)
COHGB MFR BLDA: 1.6 % (ref 0.5–1.5)
CREAT SERPL-MCNC: 0.67 MG/DL (ref 0.72–1.25)
CREAT/UREA NIT SERPL: 45
DRAWN BY BLOOD GAS (OHS): ABNORMAL
EOSINOPHIL # BLD AUTO: 0.31 X10(3)/MCL (ref 0–0.9)
EOSINOPHIL NFR BLD AUTO: 3.5 %
ERYTHROCYTE [DISTWIDTH] IN BLOOD BY AUTOMATED COUNT: 14.8 % (ref 11.5–17)
GFR SERPLBLD CREATININE-BSD FMLA CKD-EPI: >60 ML/MIN/1.73/M2
GLOBULIN SER-MCNC: 3.3 GM/DL (ref 2.4–3.5)
GLUCOSE SERPL-MCNC: 259 MG/DL (ref 82–115)
HCO3 BLDA-SCNC: 28.5 MMOL/L (ref 22–26)
HCT VFR BLD AUTO: 30.3 % (ref 42–52)
HGB BLD-MCNC: 9.3 G/DL (ref 14–18)
IMM GRANULOCYTES # BLD AUTO: 0.07 X10(3)/MCL (ref 0–0.04)
IMM GRANULOCYTES NFR BLD AUTO: 0.8 %
INHALED O2 CONCENTRATION: 50 %
LYMPHOCYTES # BLD AUTO: 2.1 X10(3)/MCL (ref 0.6–4.6)
LYMPHOCYTES NFR BLD AUTO: 23.9 %
MCH RBC QN AUTO: 28.8 PG (ref 27–31)
MCHC RBC AUTO-ENTMCNC: 30.7 G/DL (ref 33–36)
MCV RBC AUTO: 93.8 FL (ref 80–94)
METHGB MFR BLDA: 1.1 % (ref 0.4–1.5)
MONOCYTES # BLD AUTO: 0.69 X10(3)/MCL (ref 0.1–1.3)
MONOCYTES NFR BLD AUTO: 7.8 %
NEUTROPHILS # BLD AUTO: 5.57 X10(3)/MCL (ref 2.1–9.2)
NEUTROPHILS NFR BLD AUTO: 63.3 %
NRBC BLD AUTO-RTO: 0 %
O2 HB BLOOD GAS (OHS): 96.7 % (ref 94–97)
OXYGEN DEVICE BLOOD GAS (OHS): ABNORMAL
OXYHGB MFR BLDA: 9.5 G/DL (ref 12–16)
PCO2 BLDA: 41 MMHG (ref 35–45)
PH BLDA: 7.45 [PH] (ref 7.35–7.45)
PLATELET # BLD AUTO: 365 X10(3)/MCL (ref 130–400)
PMV BLD AUTO: 11.5 FL (ref 7.4–10.4)
PO2 BLDA: 104 MMHG (ref 80–100)
POCT GLUCOSE: 218 MG/DL (ref 70–110)
POCT GLUCOSE: 224 MG/DL (ref 70–110)
POCT GLUCOSE: 236 MG/DL (ref 70–110)
POTASSIUM BLOOD GAS (OHS): 4.1 MMOL/L (ref 3.5–5)
POTASSIUM SERPL-SCNC: 4.1 MMOL/L (ref 3.5–5.1)
PROT SERPL-MCNC: 5.3 GM/DL (ref 5.8–7.6)
RBC # BLD AUTO: 3.23 X10(6)/MCL (ref 4.7–6.1)
SAMPLE SITE BLOOD GAS (OHS): ABNORMAL
SAO2 % BLDA: 98.2 %
SODIUM BLOOD GAS (OHS): 140 MMOL/L (ref 137–145)
SODIUM SERPL-SCNC: 143 MMOL/L (ref 136–145)
VANCOMYCIN TROUGH SERPL-MCNC: 9 UG/ML (ref 15–20)
WBC # BLD AUTO: 8.8 X10(3)/MCL (ref 4.5–11.5)

## 2025-02-18 PROCEDURE — 82803 BLOOD GASES ANY COMBINATION: CPT

## 2025-02-18 PROCEDURE — 97535 SELF CARE MNGMENT TRAINING: CPT

## 2025-02-18 PROCEDURE — 63600175 PHARM REV CODE 636 W HCPCS: Performed by: SURGERY

## 2025-02-18 PROCEDURE — 99900035 HC TECH TIME PER 15 MIN (STAT)

## 2025-02-18 PROCEDURE — 36600 WITHDRAWAL OF ARTERIAL BLOOD: CPT

## 2025-02-18 PROCEDURE — 80202 ASSAY OF VANCOMYCIN: CPT | Performed by: SURGERY

## 2025-02-18 PROCEDURE — 25000003 PHARM REV CODE 250

## 2025-02-18 PROCEDURE — 99900026 HC AIRWAY MAINTENANCE (STAT)

## 2025-02-18 PROCEDURE — 97530 THERAPEUTIC ACTIVITIES: CPT | Mod: CQ

## 2025-02-18 PROCEDURE — 99900022

## 2025-02-18 PROCEDURE — 25000242 PHARM REV CODE 250 ALT 637 W/ HCPCS: Performed by: SURGERY

## 2025-02-18 PROCEDURE — 94761 N-INVAS EAR/PLS OXIMETRY MLT: CPT

## 2025-02-18 PROCEDURE — 80053 COMPREHEN METABOLIC PANEL: CPT

## 2025-02-18 PROCEDURE — 25000003 PHARM REV CODE 250: Performed by: SURGERY

## 2025-02-18 PROCEDURE — 36415 COLL VENOUS BLD VENIPUNCTURE: CPT | Performed by: SURGERY

## 2025-02-18 PROCEDURE — 63600175 PHARM REV CODE 636 W HCPCS

## 2025-02-18 PROCEDURE — 21400001 HC TELEMETRY ROOM

## 2025-02-18 PROCEDURE — 99900031 HC PATIENT EDUCATION (STAT)

## 2025-02-18 PROCEDURE — 27000221 HC OXYGEN, UP TO 24 HOURS

## 2025-02-18 PROCEDURE — 85025 COMPLETE CBC W/AUTO DIFF WBC: CPT

## 2025-02-18 PROCEDURE — 94640 AIRWAY INHALATION TREATMENT: CPT

## 2025-02-18 PROCEDURE — 99233 SBSQ HOSP IP/OBS HIGH 50: CPT | Mod: ,,, | Performed by: SURGERY

## 2025-02-18 PROCEDURE — 94760 N-INVAS EAR/PLS OXIMETRY 1: CPT | Mod: XB

## 2025-02-18 PROCEDURE — 36415 COLL VENOUS BLD VENIPUNCTURE: CPT

## 2025-02-18 PROCEDURE — 11000001 HC ACUTE MED/SURG PRIVATE ROOM

## 2025-02-18 RX ORDER — CIPROFLOXACIN 2 MG/ML
400 INJECTION, SOLUTION INTRAVENOUS
Status: DISCONTINUED | OUTPATIENT
Start: 2025-02-18 | End: 2025-02-20

## 2025-02-18 RX ADMIN — THIAMINE HCL TAB 100 MG 100 MG: 100 TAB at 08:02

## 2025-02-18 RX ADMIN — INSULIN ASPART 6 UNITS: 100 INJECTION, SOLUTION INTRAVENOUS; SUBCUTANEOUS at 11:02

## 2025-02-18 RX ADMIN — IPRATROPIUM BROMIDE AND ALBUTEROL SULFATE 3 ML: 2.5; .5 SOLUTION RESPIRATORY (INHALATION) at 03:02

## 2025-02-18 RX ADMIN — ACETAMINOPHEN 650 MG: 650 SOLUTION ORAL at 10:02

## 2025-02-18 RX ADMIN — FAMOTIDINE 20 MG: 20 TABLET, FILM COATED ORAL at 10:02

## 2025-02-18 RX ADMIN — VANCOMYCIN HYDROCHLORIDE 1250 MG: 1.25 INJECTION, POWDER, LYOPHILIZED, FOR SOLUTION INTRAVENOUS at 04:02

## 2025-02-18 RX ADMIN — LOSARTAN POTASSIUM 100 MG: 50 TABLET, FILM COATED ORAL at 10:02

## 2025-02-18 RX ADMIN — INSULIN GLARGINE 40 UNITS: 100 INJECTION, SOLUTION SUBCUTANEOUS at 08:02

## 2025-02-18 RX ADMIN — FOLIC ACID 1 MG: 1 TABLET ORAL at 09:02

## 2025-02-18 RX ADMIN — PIPERACILLIN SODIUM AND TAZOBACTAM SODIUM 4.5 G: 4; .5 INJECTION, POWDER, LYOPHILIZED, FOR SOLUTION INTRAVENOUS at 08:02

## 2025-02-18 RX ADMIN — INSULIN ASPART 6 UNITS: 100 INJECTION, SOLUTION INTRAVENOUS; SUBCUTANEOUS at 04:02

## 2025-02-18 RX ADMIN — PIPERACILLIN SODIUM AND TAZOBACTAM SODIUM 4.5 G: 4; .5 INJECTION, POWDER, LYOPHILIZED, FOR SOLUTION INTRAVENOUS at 01:02

## 2025-02-18 RX ADMIN — INSULIN ASPART 6 UNITS: 100 INJECTION, SOLUTION INTRAVENOUS; SUBCUTANEOUS at 12:02

## 2025-02-18 RX ADMIN — METHYLPHENIDATE HYDROCHLORIDE 20 MG: 5 TABLET ORAL at 08:02

## 2025-02-18 RX ADMIN — METHOCARBAMOL 500 MG: 500 TABLET ORAL at 10:02

## 2025-02-18 RX ADMIN — AMANTADINE HYDROCHLORIDE 100 MG: 50 SOLUTION ORAL at 08:02

## 2025-02-18 RX ADMIN — AMANTADINE HYDROCHLORIDE 100 MG: 50 SOLUTION ORAL at 10:02

## 2025-02-18 RX ADMIN — AMLODIPINE BESYLATE 10 MG: 5 TABLET ORAL at 08:02

## 2025-02-18 RX ADMIN — SODIUM CHLORIDE SOLN NEBU 3% 2 ML: 3 NEBU SOLN at 03:02

## 2025-02-18 RX ADMIN — INSULIN GLARGINE 40 UNITS: 100 INJECTION, SOLUTION SUBCUTANEOUS at 10:02

## 2025-02-18 RX ADMIN — ENOXAPARIN SODIUM 40 MG: 40 INJECTION SUBCUTANEOUS at 08:02

## 2025-02-18 RX ADMIN — SODIUM CHLORIDE SOLN NEBU 3% 2 ML: 3 NEBU SOLN at 04:02

## 2025-02-18 RX ADMIN — METHOCARBAMOL 500 MG: 500 TABLET ORAL at 05:02

## 2025-02-18 RX ADMIN — IPRATROPIUM BROMIDE AND ALBUTEROL SULFATE 3 ML: 2.5; .5 SOLUTION RESPIRATORY (INHALATION) at 12:02

## 2025-02-18 RX ADMIN — METOPROLOL TARTRATE 50 MG: 50 TABLET, FILM COATED ORAL at 10:02

## 2025-02-18 RX ADMIN — METOPROLOL TARTRATE 50 MG: 50 TABLET, FILM COATED ORAL at 08:02

## 2025-02-18 RX ADMIN — SODIUM CHLORIDE SOLN NEBU 3% 2 ML: 3 NEBU SOLN at 08:02

## 2025-02-18 RX ADMIN — ENOXAPARIN SODIUM 40 MG: 40 INJECTION SUBCUTANEOUS at 10:02

## 2025-02-18 RX ADMIN — PIPERACILLIN SODIUM AND TAZOBACTAM SODIUM 4.5 G: 4; .5 INJECTION, POWDER, LYOPHILIZED, FOR SOLUTION INTRAVENOUS at 06:02

## 2025-02-18 RX ADMIN — THERA TABS 1 TABLET: TAB at 08:02

## 2025-02-18 RX ADMIN — SODIUM CHLORIDE SOLN NEBU 3% 2 ML: 3 NEBU SOLN at 11:02

## 2025-02-18 RX ADMIN — IPRATROPIUM BROMIDE AND ALBUTEROL SULFATE 3 ML: 2.5; .5 SOLUTION RESPIRATORY (INHALATION) at 11:02

## 2025-02-18 RX ADMIN — IPRATROPIUM BROMIDE AND ALBUTEROL SULFATE 3 ML: 2.5; .5 SOLUTION RESPIRATORY (INHALATION) at 08:02

## 2025-02-18 RX ADMIN — FAMOTIDINE 20 MG: 20 TABLET, FILM COATED ORAL at 08:02

## 2025-02-18 RX ADMIN — METHYLPHENIDATE HYDROCHLORIDE 20 MG: 5 TABLET ORAL at 04:02

## 2025-02-18 RX ADMIN — VANCOMYCIN HYDROCHLORIDE 1500 MG: 1.5 INJECTION, POWDER, LYOPHILIZED, FOR SOLUTION INTRAVENOUS at 09:02

## 2025-02-18 RX ADMIN — METHOCARBAMOL 500 MG: 500 TABLET ORAL at 02:02

## 2025-02-18 RX ADMIN — IPRATROPIUM BROMIDE AND ALBUTEROL SULFATE 3 ML: 2.5; .5 SOLUTION RESPIRATORY (INHALATION) at 04:02

## 2025-02-18 RX ADMIN — SODIUM CHLORIDE SOLN NEBU 3% 2 ML: 3 NEBU SOLN at 12:02

## 2025-02-18 RX ADMIN — ACETAMINOPHEN 650 MG: 650 SOLUTION ORAL at 04:02

## 2025-02-18 NOTE — PROGRESS NOTES
Trauma ICU Progress Note    Patient Information:   Patient Name: Ezequiel Ramires                   : 1960     MRN: [id number]   Date of Admission: 2025  Code Status: Full Code  Date of Exam: 2025  HD#14  POD#14 Days Post-Op  Attending Provider: Chinmay Cope MD  Admission Summary:   Patient is a 64 year old  male with PMH of CAD, HTN, DM, HLD, alcoholism, arthritis, anxiety, MI who presents as a transfer from [facility] with bilateral SDH with 0.8 right to left midline shift, SAH, left occipital fracture, right 4th rib fracture. Report per wife patient was last seen normal around 1930 yesterday, she woke this morning to find him on the ground next to the bed with vomit on the floor, he was taken to the OSH at around 0500     Interval history:    NAEON. Off the vent ok to downgrade    Consults:   Consults: Neurosurgery, Cardiology Injuries:  1. Acute nondisplaced R 4th rib fracture  2. Tree-in-bud nodularities in the bilateral lower lobes which could be due to infectious bronchiolitis or aspiration   3. 4 mm anterolisthesis of C4 on C5 due to degeneration versus trauma  4. Nondisplaced left occipital bone fracture extending inferiorly to the level of the foramen magnum   5. Bilateral SDH  6. Bilateral IPH  7. Bilateral SAH  8. Small layering hemorrhage in the left occipital horn   9. 8 mm right to left midline shift    [x]Problems list reviewed  [x]Tertiary exam performed Operations/Procedures:  Crani 25  Percutaneous tracheostomy   PEG      Past medical history:  HTN  HLD  DMII  Psoriasis  CAD s/p stents  MI    Medications: [x] Medications reviewed/updated   Home Meds:    Current Outpatient Medications   Medication Instructions    amLODIPine (NORVASC) 5 MG tablet 1 tablet, Daily    aspirin (ECOTRIN) 81 MG EC tablet 1 tablet, Oral, Every morning    ezetimibe (ZETIA) 10 mg tablet 1 tablet, Daily    icosapent ethyL (VASCEPA) 2,000 mg, Nightly    losartan (COZAAR) 100 MG tablet 1 tablet,  Nightly    metoprolol tartrate (LOPRESSOR) 50 MG tablet 1 tablet, 2 times daily    prasugreL HCl (EFFIENT) 10 mg, Daily    rosuvastatin (CRESTOR) 10 MG tablet 1 tablet, Nightly    semaglutide (OZEMPIC SUBQ) Subcutaneous      Scheduled Meds:    albuterol-ipratropium  3 mL Nebulization Q4H    amantadine HCL  100 mg Per G Tube BID    amLODIPine  10 mg Per G Tube Daily    enoxparin  40 mg Subcutaneous Q12H (prophylaxis, 0900/2100)    famotidine  20 mg Per G Tube BID    folic acid  1 mg Per G Tube Daily    insulin glargine U-100  40 Units Subcutaneous BID    losartan  100 mg Per G Tube QHS    methocarbamoL  500 mg Per G Tube Q8H    methylphenidate HCl  20 mg Per G Tube BID WM    metoprolol tartrate  50 mg Per G Tube BID    multivitamin  1 tablet Per G Tube Daily    piperacillin-tazobactam (Zosyn) IV (PEDS and ADULTS) (extended infusion is not appropriate)  4.5 g Intravenous Q8H    sodium chloride 3%  2 mL Nebulization Q4H    thiamine  100 mg Per G Tube Daily    vancomycin 1,500 mg in sodium chloride 0.9% 250 mL IVPB (admixture device)  1,500 mg Intravenous Q12H     Continuous Infusions:    D10W   Intravenous Continuous PRN         PRN Meds:   Current Facility-Administered Medications:     acetaminophen, 650 mg, Per G Tube, Q4H PRN    acetaminophen, 650 mg, Rectal, Q4H PRN    artificial tears, 1 drop, Both Eyes, PRN    bisacodyL, 10 mg, Rectal, Daily PRN    ceFAZolin (Ancef) IV (PEDS and ADULTS), 2 g, Intravenous, On Call Procedure    D10W, , Intravenous, Continuous PRN    dextrose 50%, 12.5 g, Intravenous, PRN    dextrose 50%, 25 g, Intravenous, PRN    enalaprilat, 1.25 mg, Intravenous, Q4H PRN    fentaNYL, 50 mcg, Intravenous, Q2H PRN    glucagon (human recombinant), 1 mg, Intramuscular, PRN    glucagon (human recombinant), 1 mg, Intramuscular, PRN    hydrALAZINE, 10 mg, Intravenous, Q4H PRN    insulin aspart U-100, 0-15 Units, Subcutaneous, Q6H PRN    labetaloL, 20 mg, Intravenous, Q4H PRN    LORazepam, 2 mg, Per G  "Tube, Q4H PRN    ondansetron, 4 mg, Intravenous, Q6H PRN    oxyCODONE, 5 mg, Per G Tube, Q4H PRN    prochlorperazine, 5 mg, Intravenous, Q6H PRN    Pharmacy to dose Vancomycin consult, , , Once **AND** vancomycin - pharmacy to dose, , Intravenous, pharmacy to manage frequency     Vitals:  VITAL SIGNS: 24 HR MIN & MAX LAST   Temp  Min: 98.7 °F (37.1 °C)  Max: 101.6 °F (38.7 °C)  99.4 °F (37.4 °C)   BP  Min: 95/61  Max: 128/68  103/66    Pulse  Min: 83  Max: 108  87    Resp  Min: 18  Max: 49  (!) 34    SpO2  Min: 90 %  Max: 98 %  95 %      HT: 6' 0.01" (182.9 cm)  WT: 80 kg (176 lb 5.9 oz)  BMI: 23.9   Ideal Body Weight (IBW), Male: 178.06 lb  % Ideal Body Weight, Male (lb): 99.08 %        General  Exam: intubated      Neuro/Psych  GCS: 11T (E 4) (V T) (M 6)  Exam: Patient is following commands moving his toes for me and opening his eyes to command. Crani incision c/d/I [place] drain +SS drainage  ICP monitor: No  ICP treatment: No  C-Collar: No    Plan:   Bilateral SDH s/p crani-  Keppra x 7 days, HOB > 30 degrees. BP<140.   Continue amantadine, ritalin   Stop Keppra   CTM     HEENT  Exam: NCAT. Trach collar in place.     Plan:   monitoring     Pulmonary  Vitals: Resp  Av.9  Min: 18  Max: 49  SpO2  Av.1 %  Min: 90 %  Max: 98 %    Ventilator/Oxygen Settings:   Vent Mode: CPAP / PSV  Vt Set: 500 mL  Set Rate: 6 BPM  Pressure Support: 10 cmH20  I:E Ratio Measured: 1:2.4  Total PEEP: 5 cmH20 Vent Mode: CPAP / PSV (25 0834)  Ventilator Initiated: Yes (25 1240)  Set Rate: 6 BPM (25 0235)  Vt Set: 500 mL (25 0235)  Pressure Support: 10 cmH20 (25)  PEEP/CPAP: 5 cmH20 (25)  Oxygen Concentration (%): 40 (25)  Peak Airway Pressure: 16 cmH20 (25)  Total Ve: 15 L/m (25)  F/VT Ratio<105 (RSBI): (!) 75.06 (25)        ABG:   Recent Labs   Lab 25   PH 7.450   PO2 104.0*   PCO2 41.0   HCO3 28.5*        CXR:    X-Ray Chest 1 " "View for Line/Tube Placement    Result Date: 2025  PICC line insertion. Confluent opacities in the left retrocardiac region with silhouetting of the left hemidiaphragm which might be related to an infiltrate/atelectasis. No other change Electronically signed by: Serg Mera Date:    2025 Time:    11:33        Rib fractures: right rib fracture  Chest Tube: None     Exam: Coarse BS bilaterally on CPAP currently    Plan:     Trach collar today   3%saline/duonebs Q4  CTM  Incentive Spirometry/RT Treatments: none     Cardiovascular  Vitals: Pulse  Av.4  Min: 83  Max: 108  BP  Min: 95/61  Max: 128/68  No results for input(s): "TROPONINI", "CKTOTAL", "CKMB", "BNP" in the last 168 hours.  Vasoactive Agents: None  Exam: RRR    Plan:   HTN- continue home meds and prns     Renal  Recent Labs     25  0429   BUN 29.2* 29.9* 30.2*   CREATININE 0.73 0.71* 0.67*       No results for input(s): "LACTIC" in the last 72 hours.    Intake/Output - Last 3 Shifts          0700   0659  07 0659  07 0659    NG/ 2894     IV Piggyback 961.3 828.8     Total Intake(mL/kg) 1591.3 (19.9) 3722.8 (46.5)     Urine (mL/kg/hr) 1650 (0.9) 2250 (1.2)     Stool 0 0     Total Output 1650 2250     Net -58.7 +1472.8            Urine Occurrence 1 x      Stool Occurrence 3 x 1 x              Intake/Output Summary (Last 24 hours) at 2025 1039  Last data filed at 2025 0600  Gross per 24 hour   Intake 3722.83 ml   Output 2250 ml   Net 1472.83 ml         Garcia: no    Plan:   monitor     FEN/GI  Recent Labs     25  01225  0329 25  0429    142 143   K 3.9 4.1 4.1    109* 109*   CO2 25 27 25   CALCIUM 8.6* 8.6* 8.0*   ALBUMIN 2.1* 2.1* 2.0*   BILITOT 0.4 <0.5 0.4   AST 22 18 19   ALKPHOS 82 75 67   ALT 22 18 19       Diet: NPO. Resume tube feeds s/p PEG.     Last BM: +BM    Abdominal Exam: s/NT/ND +BS. PEG tube in place. "     Plan:   Tube feeds through PEG  Bowel regimen     Heme/Onc  Recent Labs     25  0129 25  0329 25  0429   HGB 10.3* 10.1* 9.3*   HCT 32.5* 31.9* 30.3*   * 411* 365       Transfusions (over past 24h): None    Plan:   monitor     ID  Temp  Av.1 °F (37.8 °C)  Min: 98.7 °F (37.1 °C)  Max: 101.6 °F (38.7 °C)      Recent Labs     25  01225  0329 25  0429   WBC 12.68* 12.17* 8.80       Cultures: Antibiotics:    BCX x2 () in process  RCX () Enterobacter 1. Cefepime       Plan:   Fever- will re-culture Blood and respiratory and start broad spectrum ABX     Endocrine  Recent Labs     25  01225  0329 25  0429   GLUCOSE 294* 262* 259*      Recent Labs     25  2011 25  0401 25  0749 25  1137 25  1600 25  2024 25  0017 25  0404   POCTGLUCOSE 286* 228* 166* 226* 179* 187* 224* 236*        Plan:   DMII-High sliding scale. Lantus 40 BID.  Insulin treatment: High sliding scale and Lantus 40 BID     Musculoskeletal  Weight bearing status:   RUE: WBAT  LUE: WBAT  RLE: WBAT  LLE: WBAT    Exam: does not have purposeful movement  Plan:   monitor     Wounds  Wounds exam: crani incision c/d/I RORO drain in place  Wound vac: No   Media: none  Plan: local wound care     Precautions  Precautions: Seizure     Prophylaxis  Seizure: Keppra completed  DVT: Lovenox  GI: H2B     Lines/drains/airway   Lines/Drains/Airways       Drain  Duration             Male External Urinary Catheter 25 2300 Small 11 days         Gastrostomy/Enterostomy 25 1017 LUQ 7 days              Airway  Duration             Adult Surgical Airway 25 0958 [place]iley Cuffed 8.0 / 85 mm 7 days              Peripheral Intravenous Line  Duration                  Peripheral IV - Single Lumen 25 1600 18 G Anterior;Right Upper Arm 9 days         Peripheral IV - Single Lumen 25 0000 20 G Anterior;Left;Proximal Forearm <1 day                     Plan:  Cont all lines    [x]LDA reviewed/updated      Restraints  Face to face evaluation of need for restraints on rounds today:   Currently restrained? No.        Assessment & Disposition:   Problem list:  Active Problem List with Overview Notes    Diagnosis Date Noted    Acute hypoxemic respiratory failure 02/12/2025    Dysphagia 02/12/2025    VAP (ventilator-associated pneumonia) 02/12/2025    Intraparenchymal hemorrhage of brain 02/05/2025    IVH (intraventricular hemorrhage) 02/05/2025    SDH (subdural hematoma) 02/04/2025    SAH (subarachnoid hemorrhage) 02/04/2025    Closed fracture of one rib of right side 02/04/2025    Closed fracture of left side of occipital bone 02/04/2025       downgrade  Bilateral SDH s/p crani- Keppra x 7 days (stop 2/11), HOB > 30 degrees. BP<140. Continue amantadine, ritalin.  Wean back to trach collar today will start 3%saline/Duonebs Q4 for secretions   HTN-  continue home meds and PRNs  Fever- will re-culture Blood and resp. Borad spectrum Vanc/zosyn  Lovenox  PT/OT - high intensity therapy  DMII-increased to high sliding scale; latus 40 BID  Approved to [facility] waiting on insurance        Critical Care Time:   41 minutes of critical care was spent on this patient personally by me on the following activities: development of treatment plan with patient and bedside nurse, discussions with consultants, evaluation of patient's response to treatment, examining the patient, ordering and preforming treatments and interventions, ordering and reviewing laboratory studies, ordering and reviewing radiologic studies, and re-evaluation of patient's condition.     Daisy Moreno Jr., MD, MS  Trauma Critical Care Surgery  [facility]   02/18/2025

## 2025-02-18 NOTE — PLAN OF CARE
Problem: Adult Inpatient Plan of Care  Goal: Plan of Care Review  2/18/2025 0525 by Jillian Parker RN  Outcome: Progressing  2/18/2025 0203 by Jillian Parker RN  Outcome: Progressing  Goal: Patient-Specific Goal (Individualized)  2/18/2025 0525 by Jillian Parker RN  Outcome: Progressing  2/18/2025 0203 by Jillian Parker RN  Outcome: Progressing  Goal: Absence of Hospital-Acquired Illness or Injury  2/18/2025 0525 by Jillian Parker RN  Outcome: Progressing  2/18/2025 0203 by Jillian Parker RN  Outcome: Progressing  Goal: Optimal Comfort and Wellbeing  2/18/2025 0525 by Jillian Parker RN  Outcome: Progressing  2/18/2025 0203 by Jillian Parker RN  Outcome: Progressing  Goal: Readiness for Transition of Care  2/18/2025 0525 by Jillian Parker RN  Outcome: Progressing  2/18/2025 0203 by Jillian Parker RN  Outcome: Progressing     Problem: Infection  Goal: Absence of Infection Signs and Symptoms  2/18/2025 0525 by Jillian Parker RN  Outcome: Progressing  2/18/2025 0203 by Jillian Parker RN  Outcome: Progressing     Problem: Wound  Goal: Optimal Coping  2/18/2025 0525 by Jillian Parker RN  Outcome: Progressing  2/18/2025 0203 by Jillian Parker RN  Outcome: Progressing  Goal: Optimal Functional Ability  2/18/2025 0525 by Jillian Parker RN  Outcome: Progressing  2/18/2025 0203 by Jillian Parker RN  Outcome: Progressing  Goal: Absence of Infection Signs and Symptoms  2/18/2025 0525 by Jillian Parker RN  Outcome: Progressing  2/18/2025 0203 by Jillian Parker RN  Outcome: Progressing  Goal: Improved Oral Intake  2/18/2025 0525 by Jillian Parker RN  Outcome: Progressing  2/18/2025 0203 by Jillian Parker RN  Outcome: Progressing  Goal: Optimal Pain Control and Function  2/18/2025 0525 by Jillian Parker RN  Outcome: Progressing  2/18/2025 0203 by Jillian Parker RN  Outcome: Progressing  Goal: Skin Health and Integrity  2/18/2025 0525 by Jillian Parker,  RN  Outcome: Progressing  2/18/2025 0203 by Jillian Parker RN  Outcome: Progressing  Goal: Optimal Wound Healing  2/18/2025 0525 by Jillian Parker RN  Outcome: Progressing  2/18/2025 0203 by Jillian Parker RN  Outcome: Progressing     Problem: Fall Injury Risk  Goal: Absence of Fall and Fall-Related Injury  2/18/2025 0525 by Jillian Parker, RN  Outcome: Progressing  2/18/2025 0203 by Jillian Parker RN  Outcome: Progressing     Problem: Skin Injury Risk Increased  Goal: Skin Health and Integrity  2/18/2025 0525 by Jillian Parker RN  Outcome: Progressing  2/18/2025 0203 by Jillian Parker RN  Outcome: Progressing     Problem: Delirium  Goal: Optimal Coping  2/18/2025 0525 by Jillian Parker RN  Outcome: Progressing  2/18/2025 0203 by Jillian Parker RN  Outcome: Progressing  Goal: Improved Behavioral Control  2/18/2025 0525 by Jillian Parker, RN  Outcome: Progressing  2/18/2025 0203 by Jillian Parker RN  Outcome: Progressing  Goal: Improved Attention and Thought Clarity  2/18/2025 0525 by Jillian Parker RN  Outcome: Progressing  2/18/2025 0203 by Jillian Parker RN  Outcome: Progressing  Goal: Improved Sleep  2/18/2025 0525 by Jillian Parker, RN  Outcome: Progressing  2/18/2025 0203 by Jillian Parker, RN  Outcome: Progressing     Problem: Mechanical Ventilation Invasive  Goal: Effective Communication  2/18/2025 0525 by Jillian Parker RN  Outcome: Progressing  2/18/2025 0203 by Jillian Parker RN  Outcome: Progressing  Goal: Optimal Device Function  2/18/2025 0525 by Jillian Parker RN  Outcome: Progressing  2/18/2025 0203 by Jillian Parker RN  Outcome: Progressing  Goal: Mechanical Ventilation Liberation  2/18/2025 0525 by Jillian Parker, RN  Outcome: Progressing  2/18/2025 0203 by Jillian Parker RN  Outcome: Progressing  Goal: Optimal Nutrition Delivery  2/18/2025 0525 by Jillian Parker, RN  Outcome: Progressing  2/18/2025 0203 by Jillian Parker, RN  Outcome:  Progressing  Goal: Absence of Device-Related Skin and Tissue Injury  2/18/2025 0525 by Jillian Parker RN  Outcome: Progressing  2/18/2025 0203 by Jillian Parker RN  Outcome: Progressing  Goal: Absence of Ventilator-Induced Lung Injury  2/18/2025 0525 by Jillian Parekr RN  Outcome: Progressing  2/18/2025 0203 by Jillian Parker RN  Outcome: Progressing     Problem: Artificial Airway  Goal: Effective Communication  2/18/2025 0525 by Jillian Parker RN  Outcome: Progressing  2/18/2025 0203 by Jillian Parker RN  Outcome: Progressing  Goal: Optimal Device Function  2/18/2025 0525 by Jillian Parker RN  Outcome: Progressing  2/18/2025 0203 by Jillian Parker RN  Outcome: Progressing  Goal: Absence of Device-Related Skin or Tissue Injury  2/18/2025 0525 by Jillian Parker RN  Outcome: Progressing  2/18/2025 0203 by Jillian Parker RN  Outcome: Progressing     Problem: Wound  Goal: Optimal Functional Ability  2/18/2025 0525 by Jillian Parker RN  Outcome: Progressing  2/18/2025 0203 by Jillian Parker RN  Outcome: Progressing     Problem: Wound  Goal: Optimal Functional Ability  2/18/2025 0525 by Jillian Parker RN  Outcome: Progressing  2/18/2025 0203 by Jillian Parker RN  Outcome: Progressing     Problem: Wound  Goal: Absence of Infection Signs and Symptoms  2/18/2025 0525 by Jillian Parker RN  Outcome: Progressing  2/18/2025 0203 by Jillian Parker RN  Outcome: Progressing     Problem: Wound  Goal: Improved Oral Intake  2/18/2025 0525 by Jillian Parker RN  Outcome: Progressing  2/18/2025 0203 by Jillian Parker RN  Outcome: Progressing     Problem: Delirium  Goal: Optimal Coping  2/18/2025 0525 by Jillian Parker RN  Outcome: Progressing  2/18/2025 0203 by Jillian Parker RN  Outcome: Progressing     Problem: Artificial Airway  Goal: Effective Communication  2/18/2025 0525 by Jillian Parker, RN  Outcome: Progressing  2/18/2025 0203 by Jillian Parker, SHANNON  Outcome:  Progressing     Problem: Artificial Airway  Goal: Optimal Device Function  2/18/2025 0525 by Jillian Parker RN  Outcome: Progressing  2/18/2025 0203 by Jillian Parker RN  Outcome: Progressing     Problem: Wound  Goal: Skin Health and Integrity  2/18/2025 0525 by Jillian Parker RN  Outcome: Progressing  2/18/2025 0203 by Jillian Parker RN  Outcome: Progressing

## 2025-02-18 NOTE — PT/OT/SLP PROGRESS
Occupational Therapy   Treatment    Name: Ezequiel Ramires  MRN: [id number]  Admitting Diagnosis:  SDH (subdural hematoma)  14 Days Post-Op    Recommendations:     Recommended therapy intensity at discharge: High Intensity Therapy   Discharge Equipment Recommendations:  to be determined by next level of care  Barriers to discharge:   (awaiting placement)    Assessment:     Ezequiel Ramires is a 65 y.o. male with a medical diagnosis of B SDH s/p R craniectomy, SAH, IPH, L occipital bone fx, R 4th rib fx. Patient developed ST with bigeminy; is now s/p trach and PEG placement. Performance deficits affecting function are weakness, impaired endurance, impaired self care skills, impaired functional mobility, gait instability, impaired balance, impaired cognition, decreased upper extremity function, decreased lower extremity function, decreased safety awareness, decreased coordination, decreased ROM. Patient demos behaviors consistent with Level 5 on [place] Scale. Patient performed transfer bed > chair and 2 sit <> stands - though continues to require max-totalAx2. Patient performs all activities during session on trach collar demo'ing improved toerlance. Will order standing bed to progress patient's upright tolerance and strengthen him for ADL preparation. Great candidate for high intensity therapy in a neuro rehab setting.     Rehab Prognosis:  Good; patient would benefit from acute skilled OT services to address these deficits and reach maximum level of function.       Plan:     Patient to be seen 5 x/week to address the above listed problems via self-care/home management, therapeutic activities, therapeutic exercises, neuromuscular re-education, cognitive retraining  Plan of Care Expires: 03/07/25  Plan of Care Reviewed with: patient, spouse    Subjective     Pain/Comfort:  Pain Rating 1: 0/10    Objective:     Communicated with: MILTON prior to session.  Patient found HOB elevated with blood pressure cuff, oxygen,  peripheral IV, PureWick, pulse ox (continuous), telemetry, Tracheostomy, PEG Tube upon OT entry to room.    General Precautions: Standard, fall (BP < 150/90; bone flap pxs)    Orthopedic Precautions:N/A  Braces:  (helmet)  Respiratory Status: trach collar 6L  Vital Signs: 106/68 96% 85 bpm     Occupational Performance:     Bed Mobility:    Patient completed Supine to Sit with maximal assistance and 2 persons  Patient completed Sit to Supine with maximal assistance and 2 persons   Sitting EOB with maxA; multidirectional LOBs; stooped posture and what appears to be low trunk tone    Functional Mobility/Transfers:  Patient completed Sit <> Stand Transfer with maximal assistance and of 2 persons  with  hand-held assist x2; able to achieve one full stand. BLE buckling, no activation or initiation noted  Patient completed bed > chair transfer with maximal assistance and of 2 persons via squat pivot using HHA; while seated in chair, noted to have decreased R lean at rest    Therapeutic Positioning    OT interventions performed during the course of today's session in an effort to prevent and/or reduce acquired pressure injuries:   Education was provided on benefits of and recommendations for therapeutic positioning  Therapeutic positioning was provided at the conclusion of session to offload all bony prominences for the prevention and/or reduction of pressure injuries  Positioning recommendations were communicated to care team       [facility] 6 Click ADL: 12    Patient Education:  Patient provided with verbal education education regarding OT role/goals/POC, fall prevention, safety awareness, Discharge/DME recommendations, and pressure ulcer prevention.  Additional teaching is warranted.      Patient left up in chair with all lines intact, call button in reach, wedge under R side, patience pad placed, and NSG notified.    GOALS:   Multidisciplinary Problems       Occupational Therapy Goals          Problem: Occupational Therapy     Goal Priority Disciplines Outcome Interventions   Occupational Therapy Goal     OT, PT/OT Progressing    Description: LTG: Pt will perform basic ADLs and ADL t/fs with min A using LRAD by d/c.    STG: to be met by 3/7/25  1. Pt will follow 100% of simple one step commands across 3 sessions  2. Pt will perform grooming EOB with min A.   3. Pt will perform functional grasp/reach/release of items >80% of trials in prep for increased participation in ADL tasks.   4. Pt will perform sit<>stand with mod A in prep for ADL t/fs.  5. Pt will perform bedside commode transfer with mod A.                         Time Tracking:     OT Date of Treatment:    OT Start Time: 1016  OT Stop Time: 1043  OT Total Time (min): 27 min    Billable Minutes:Neuromuscular Re-education 2  OT/WENDI: OT     Number of WENDI visits since last OT visit: 3    2/18/2025

## 2025-02-18 NOTE — PLAN OF CARE
Trauma: updates sent to Rani at [facility]. They have approval .  planned to downgrade pt today to trauma from ICU, temps are better today.   Rani will share the info sent with [facility]  and let me know if they would like to accept.

## 2025-02-18 NOTE — PT/OT/SLP PROGRESS
Physical Therapy Treatment    Patient Name:  Ezequiel Ramires   MRN:  [id number]    Recommendations:     Discharge therapy intensity: High Intensity Therapy   Discharge Equipment Recommendations:  (TBD)  Barriers to discharge: Impaired mobility, Ongoing medical needs, and placement    Assessment:     Ezequiel Ramires is a 65 y.o. male admitted with a medical diagnosis of B SDH s/p R craniectomy, SAH, IPH, L occipital bone fx, R 4th rib fx. Patient developed ST with bigeminy; is now s/p trach and PEG placement.  He presents with the following impairments/functional limitations: weakness, impaired self care skills, impaired functional mobility, decreased lower extremity function, impaired endurance, impaired balance.    Rehab Prognosis: Good; patient would benefit from acute skilled PT services to address these deficits and reach maximum level of function.    Recent Surgery: Procedure(s) (LRB):  CRANIOTOMY, FOR SUBDURAL HEMATOMA EVACUATION (Right) 14 Days Post-Op    Plan:     During this hospitalization, patient would benefit from acute PT services 6 x/week to address the identified rehab impairments via therapeutic activities, therapeutic exercises and progress toward the following goals:    Plan of Care Expires:  03/08/25    Subjective     Chief Complaint: Thirsty; RN notified for swabs  Patient/Family Comments/goals: Get better  Pain/Comfort:         Objective:     Communicated with RN prior to session.  Patient found HOB elevated with blood pressure cuff, PEG Tube, PureWick, peripheral IV, PRAFO, pulse ox (continuous), telemetry, SCD, Tracheostomy upon PT entry to room.     General Precautions: Standard, fall (BP < 150/90, R bone flap pxns)  Orthopedic Precautions: N/A  Braces:  (helmet)  Respiratory Status:  trach collar 6L  Blood Pressure: 106/68  Skin Integrity: Visible skin intact    Functional Mobility:  Bed Mobility: BLE initiation towards EOB  Rolling Left:  maximal assistance  Rolling Right: maximal assistance  Supine  to Sit: maximal assistance and of 2 persons  Sit to Supine: maximal assistance and of 2 persons  Transfers:     Sit to Stand:  maxA x 2  with hand-held assist  Bed to Chair: maxA x 2 with  no AD  using  Squat Pivot  Balance: maxA    Therapeutic Activities/Exercises:  Rolling R<>L maxA; UE reaching towards bed rails; increased time required 2/2 +BM  STS x 2 trials from bschair; totA x 2; skye knee blocking performed; pt unable to stand fully erect; moderate hip clearance from chair.    Education:  Patient and spouse were provided with verbal education education regarding PT role/goals/POC, fall prevention, and safety awareness.  Understanding was verbalized, however additional teaching warranted.     Patient left up in chair with all lines intact, call button in reach, patience pad in place, RN notified, and wife present    GOALS:   Multidisciplinary Problems       Physical Therapy Goals          Problem: Physical Therapy    Goal Priority Disciplines Outcome Interventions   Physical Therapy Goal     PT, PT/OT Progressing    Description: Goals to be met by: d/c     Patient will increase functional independence with mobility by performin. Supine to sit with MInimal Assistance  2. Sit to supine with MInimal Assistance  3. Sit to stand transfer with Minimal Assistance  4. Bed to chair transfer with Minimal Assistance using LRAD vs pivot  5. 10' with B HHA MaxA                          Time Tracking:     PT Received On: 25  PT Start Time: 1016     PT Stop Time: 1043  PT Total Time (min): 27 min     Billable Minutes: Therapeutic Activity 2    Treatment Type: Treatment  PT/PTA: PTA     Number of PTA visits since last PT visit: 3     2025

## 2025-02-18 NOTE — PROGRESS NOTES
Pharmacokinetic Assessment Follow Up: IV Vancomycin    Vancomycin serum concentration assessment(s):    The trough level was drawn correctly and can be used to guide therapy at this time. The measurement is below the desired definitive target range of 15 to 20 mcg/mL.    Vancomycin Regimen Plan:    Change regimen to Vancomycin 1250 mg IV every 8 hours with next serum trough concentration measured at 1600 prior to 1700 dose on 2/19.    Scheduled Administration Times    0100  0900  1700    Drug levels (last 3 results):  Recent Labs   Lab Result Units 02/18/25  0838   Vancomycin Trough ug/ml 9.0*       Vancomycin Administrations:  vancomycin given in the last 96 hours                     vancomycin 1,500 mg in sodium chloride 0.9% 250 mL IVPB (admixture device) (mg) 1,500 mg New Bag 02/18/25 0905     1,500 mg New Bag 02/17/25 2130      Restarted  0927     1,500 mg New Bag  0924     1,500 mg New Bag 02/16/25 2202    vancomycin (VANCOCIN) 1,000 mg in 0.9% NaCl 250 mL IVPB (admixture device) (mg) 1,000 mg New Bag 02/16/25 1130    vancomycin (VANCOCIN) 1,000 mg in 0.9% NaCl 250 mL IVPB (admixture device) (mg) 1,000 mg New Bag 02/16/25 1006                    Pharmacy will continue to follow and monitor vancomycin.    Please contact pharmacy at extension [phone] for questions regarding this assessment.    Thank you for the consult,   Dieudonne Escobar       Patient brief summary:  Ezequiel Ramires is a 65 y.o. male initiated on antimicrobial therapy with IV Vancomycin for treatment of lower respiratory infection.    The patient's current regimen is 1250 mg every 8 hours.    Drug Allergies:   Review of patient's allergies indicates:  No Known Allergies    Actual Body Weight:  Wt Readings from Last 1 Encounters:   02/05/25 80 kg (176 lb 5.9 oz)       Renal Function:   Estimated Creatinine Clearance: 120.6 mL/min (A) (based on SCr of 0.67 mg/dL (L)).    CBC (last 72 hours):  Recent Labs   Lab Result Units 02/16/25  0129 02/17/25  5039  02/18/25  0429   WBC x10(3)/mcL 12.68* 12.17* 8.80   Hgb g/dL 10.3* 10.1* 9.3*   Hct % 32.5* 31.9* 30.3*   Platelet x10(3)/mcL 404* 411* 365   Mono % % 6.8 8.4 7.8   Eos % % 0.9 1.3 3.5   Basophil % % 0.5 0.7 0.7       Metabolic Panel (last 72 hours):  Recent Labs   Lab Result Units 02/16/25  0129 02/16/25  0310 02/17/25  0329 02/17/25  0633 02/18/25  0429 02/18/25  0708   Sodium mmol/L 144  --  142  --  143  --    Sodium, Blood Gas mmol/L  --  140  --  140  --  140   Potassium mmol/L 3.9  --  4.1  --  4.1  --    Potassium, Blood Gas mmol/L  --  4.0  --  4.1  --  4.1   Chloride mmol/L 107  --  109*  --  109*  --    CO2 mmol/L 25  --  27  --  25  --    Glucose mg/dL 294*  --  262*  --  259*  --    Blood Urea Nitrogen mg/dL 29.2*  --  29.9*  --  30.2*  --    Creatinine mg/dL 0.73  --  0.71*  --  0.67*  --    Albumin g/dL 2.1*  --  2.1*  --  2.0*  --    Bilirubin Total mg/dL 0.4  --  <0.5  --  0.4  --    ALP unit/L 82  --  75  --  67  --    AST unit/L 22 -- 18 -- 19  --    ALT unit/L 22 -- 18  -- 19  --        Microbiologic Results:  Microbiology Results (last 7 days)       Procedure Component Value Units Date/Time    Respiratory Culture [[id number]]  (Abnormal) Collected: 02/16/25 0900    Order Status: Completed Specimen: Respiratory from Trachael Aspirate Updated: 02/18/25 1332     Respiratory Culture Streptococcus constellatus     Comment: with normal respiratory autumn        GRAM STAIN Quality 1+      Many Gram positive cocci      Many Gram Positive Rods      Few Gram Negative Rods    Blood Culture [[id number]]  (Normal) Collected: 02/16/25 0846    Order Status: Completed Specimen: Blood from Hand, Right Updated: 02/18/25 1000     Blood Culture No Growth At 48 Hours    Blood Culture [[id number]]  (Normal) Collected: 02/16/25 0852    Order Status: Completed Specimen: Blood from Wrist, Left Updated: 02/18/25 1000     Blood Culture No Growth At 48 Hours    Blood Culture [[id number]]  (Normal) Collected:  02/10/25 1137    Order Status: Completed Specimen: Blood Updated: 02/15/25 1401     Blood Culture No Growth at 5 days    Blood Culture [[id number]]  (Normal) Collected: 02/10/25 1043    Order Status: Completed Specimen: Blood Updated: 02/15/25 1201     Blood Culture No Growth at 5 days    Respiratory Culture [[id number]]  (Abnormal)  (Susceptibility) Collected: 02/10/25 1057    Order Status: Completed Specimen: Sputum from Endotracheal Aspirate Updated: 02/12/25 1221     Respiratory Culture Moderate Enterobacter cloacae complex     Comment: with normal respiratory autumn        GRAM STAIN Quality 2+      Moderate Gram positive cocci      Few Gram Positive Rods

## 2025-02-18 NOTE — PROGRESS NOTES
Inpatient Nutrition Assessment    Admit Date: 2/4/2025   Total duration of encounter: 14 days   Patient Age: 65 y.o.    Nutrition Recommendation/Prescription     Tube feeding recommendation:     Impact Peptide 1.5 goal rate 70 ml/hr to provide  2100 kcal/d  (97% est needs)  131 g protein/d (100% est needs)  1078 ml free water/d (50% est needs)  240gm CHO/day  (calculations based on estimated 20 hr/d run time)     If no IV fluids running, can give 100ml q 2hr water flushes. Total water provided: 2078ml (119% est needs.)     Continue to medically manage CBGs per MD.     Communication of Recommendations: reviewed with nurse    Nutrition Assessment     Malnutrition Assessment/Nutrition-Focused Physical Exam       Malnutrition Level: other (see comments) (Does not meet criteria) (02/05/25 0931)  Energy Intake (Malnutrition): other (see comments) (Unable to assess) (02/05/25 0931)  Weight Loss (Malnutrition): other (see comments) (Unable to assess) (02/05/25 0931)                                         Fluid Accumulation (Malnutrition): other (see comments) (Not present) (02/05/25 0931)        A minimum of two characteristics is recommended for diagnosis of either severe or non-severe malnutrition.    Chart Review    Reason Seen: continuous nutrition monitoring and physician consult for Tf    Malnutrition Screening Tool Results   Have you recently lost weight without trying?: No  Have you been eating poorly because of a decreased appetite?: No   MST Score: 0   Diagnosis:  SDH, SAH, IPH  Left occipital bone fracture  Right 4th rib fracture    Relevant Medical History:   CAD, DM, HTN, HLD, alcoholism, anxiety, arthritis, MI     Scheduled Medications:  albuterol-ipratropium, 3 mL, Q4H  amantadine HCL, 100 mg, BID  amLODIPine, 10 mg, Daily  enoxparin, 40 mg, Q12H (prophylaxis, 0900/2100)  famotidine, 20 mg, BID  folic acid, 1 mg, Daily  insulin glargine U-100, 40 Units, BID  losartan, 100 mg, QHS  methocarbamoL, 500 mg,  Q8H  methylphenidate HCl, 20 mg, BID WM  metoprolol tartrate, 50 mg, BID  multivitamin, 1 tablet, Daily  piperacillin-tazobactam (Zosyn) IV (PEDS and ADULTS) (extended infusion is not appropriate), 4.5 g, Q8H  sodium chloride 3%, 2 mL, Q4H  thiamine, 100 mg, Daily  vancomycin 1,500 mg in sodium chloride 0.9% 250 mL IVPB (admixture device), 1,500 mg, Q12H    Continuous Infusions:  D10W    PRN Medications:  acetaminophen, 650 mg, Q4H PRN  acetaminophen, 650 mg, Q4H PRN  artificial tears, 1 drop, PRN  bisacodyL, 10 mg, Daily PRN  ceFAZolin (Ancef) IV (PEDS and ADULTS), 2 g, On Call Procedure  D10W, , Continuous PRN  dextrose 50%, 12.5 g, PRN  dextrose 50%, 25 g, PRN  enalaprilat, 1.25 mg, Q4H PRN  fentaNYL, 50 mcg, Q2H PRN  glucagon (human recombinant), 1 mg, PRN  glucagon (human recombinant), 1 mg, PRN  hydrALAZINE, 10 mg, Q4H PRN  insulin aspart U-100, 0-15 Units, Q6H PRN  labetaloL, 20 mg, Q4H PRN  LORazepam, 2 mg, Q4H PRN  ondansetron, 4 mg, Q6H PRN  oxyCODONE, 5 mg, Q4H PRN  prochlorperazine, 5 mg, Q6H PRN  vancomycin - pharmacy to dose, , pharmacy to manage frequency    Calorie Containing IV Medications: no significant kcals from medications at this time    Recent Labs   Lab 02/12/25  0340 02/13/25  0355 02/14/25  0343 02/15/25  0043 02/16/25  0129 02/17/25  0329 02/18/25  0429    140 141 145 144 142 143   K 3.7 3.7 3.6 3.8 3.9 4.1 4.1   CALCIUM 8.4* 8.6* 8.8 8.3* 8.6* 8.6* 8.0*    109* 107 110* 107 109* 109*   CO2 26 25 25 25 25 27 25   BUN 32.4* 29.0* 33.3* 30.7* 29.2* 29.9* 30.2*   CREATININE 0.74 0.73 0.76 0.72 0.73 0.71* 0.67*   EGFRNORACEVR >60 >60 >60 >60 >60 >60 >60   GLUCOSE 319* 307* 292* 218* 294* 262* 259*   BILITOT 0.5 0.4 0.4 0.4 0.4 <0.5 0.4   ALKPHOS 69 75 86 73 82 75 67   ALT 23 22 21 23 22 18 19   AST 20 17 30 31 22 18 19   ALBUMIN 2.5* 2.3* 2.3* 2.3* 2.1* 2.1* 2.0*   PREALB  --  14.2*  --   --   --  12.1*  --    CRP  --  121.90*  --   --   --  188.40*  --    WBC 7.63 11.20 13.83*  "12.47* 12.68* 12.17* 8.80   HGB 10.6* 11.0* 11.3* 10.9* 10.3* 10.1* 9.3*   HCT 33.1* 34.2* 35.6* 33.5* 32.5* 31.9* 30.3*     Nutrition Orders:  Diet NPO  Tube Feedings/Formulas 70; 1,400; Impact Peptide 1.5; OG; 100; Every 2 hours    Appetite/Oral Intake: not applicable/not applicable  Factors Affecting Nutritional Intake: on mechanical ventilation  Social Needs Impacting Access to Food: unable to assess at this time; will attempt on follow-up  Food/Oriental orthodox/Cultural Preferences: unable to obtain  Food Allergies: no known food allergies  Last Bowel Movement: 02/17/25  Wound(s):  Incision documentation noted     Comments    2/5/25: Discussed with RN. Will provide tube feeding recommendations for when appropriate to start tube feeding. Receiving kcal from meds.  Noted elevated Tmax, may need to update est needs upon F/U.     2/7/25: TF continues, tolerated per RN. Noted Na and Cl elevated. Purposefully elevated. Being managed by neurosurgery.    2/11/25: TF continues. Currently on hold for PEG and trach today. Noted lantus orders. Noted Ozempic listed in pt home meds.    2/14/25: TF continues, tolerated per RN. Noted elevated CBGs. Pt on same amount of CHO TF formula as DM formula. Discussed with MD. Plans for continuing formula and adding insulin as needed.     2/18/25: TF continues, tolerating at goal rate per RN. 100 mL q2hr FWF.     Anthropometrics    Height: 6' 0.01" (182.9 cm), Height Method: Estimated  Last Weight: 80 kg (176 lb 5.9 oz) (02/05/25 0400), Weight Method: Bed Scale  BMI (Calculated): 23.9  BMI Classification: normal (BMI 18.5-24.9)        Ideal Body Weight (IBW), Male: 178.06 lb     % Ideal Body Weight, Male (lb): 99.08 %                          Usual Weight Provided By: unable to obtain usual weight    Wt Readings from Last 5 Encounters:   02/05/25 80 kg (176 lb 5.9 oz)     Weight Change(s) Since Admission:   Wt Readings from Last 1 Encounters:   02/05/25 0400 80 kg (176 lb 5.9 oz)   02/04/25 " 1234 80 kg (176 lb 5.9 oz)   Admit Weight: 80 kg (176 lb 5.9 oz) (02/04/25 1234), Weight Method: Estimated    Estimated Needs    Weight Used For Calorie Calculations: 80 kg (176 lb 5.9 oz)  Energy Calorie Requirements (kcal): 2158kcal  Energy Need Method: [facility]  Weight Used For Protein Calculations: 80 kg (176 lb 5.9 oz)  Protein Requirements: 120-136gm (1.5-1.7g/kg)  Fluid Requirements (mL): 2158ml (1ml/kcal)  CHO Requirement: 245gm (45% est kcal needs)     Enteral Nutrition     Formula: Impact Peptide 1.5 Gus  Rate/Volume: 70ml/hr  Water Flushes: 100ml q2hr  Additives/Modulars: none at this time  Route: PEG tube  Method: continuous  Total Nutrition Provided by Tube Feeding, Additives, and Flushes:  Calories Provided  2100 kcal/d, 97% needs   Protein Provided  131 g/d, 100% needs   Fluid Provided  2078 ml/d, 96% needs   Continuous feeding calculations based on estimated 20 hr/d run time unless otherwise stated.    Parenteral Nutrition     Patient not receiving parenteral nutrition support at this time.    Evaluation of Received Nutrient Intake    Calories: meeting estimated needs  Protein: meeting estimated needs    Patient Education     Not applicable.    Nutrition Diagnosis     PES: Inadequate oral intake related to acute illness as evidenced by intubation since admit. (active)     PES:            Nutrition Interventions     Intervention(s): collaboration with other providers  Intervention(s):      Goal: Meet greater than 80% of nutritional needs by follow-up. (goal progressing)  Goal: Tolerate enteral feeding at goal rate by follow-up. (goal progressing)    Nutrition Goals & Monitoring     Dietitian will monitor: energy intake and enteral nutrition intake  Discharge planning: too early to determine; pending clinical course  Nutrition Risk/Follow-Up: high (follow-up in 1-4 days)   Please consult if re-assessment needed sooner.

## 2025-02-19 LAB
ALBUMIN SERPL-MCNC: 2.1 G/DL (ref 3.4–4.8)
ALBUMIN/GLOB SERPL: 0.5 RATIO (ref 1.1–2)
ALP SERPL-CCNC: 73 UNIT/L (ref 40–150)
ALT SERPL-CCNC: 20 UNIT/L (ref 0–55)
ANION GAP SERPL CALC-SCNC: 7 MEQ/L
AST SERPL-CCNC: 20 UNIT/L (ref 5–34)
BASOPHILS # BLD AUTO: 0.06 X10(3)/MCL
BASOPHILS NFR BLD AUTO: 0.8 %
BILIRUB SERPL-MCNC: 0.4 MG/DL
BUN SERPL-MCNC: 23.4 MG/DL (ref 8.4–25.7)
CALCIUM SERPL-MCNC: 8.5 MG/DL (ref 8.8–10)
CHLORIDE SERPL-SCNC: 106 MMOL/L (ref 98–107)
CO2 SERPL-SCNC: 26 MMOL/L (ref 23–31)
CREAT SERPL-MCNC: 0.69 MG/DL (ref 0.72–1.25)
CREAT/UREA NIT SERPL: 34
EOSINOPHIL # BLD AUTO: 0.3 X10(3)/MCL (ref 0–0.9)
EOSINOPHIL NFR BLD AUTO: 4.1 %
ERYTHROCYTE [DISTWIDTH] IN BLOOD BY AUTOMATED COUNT: 14.5 % (ref 11.5–17)
GFR SERPLBLD CREATININE-BSD FMLA CKD-EPI: >60 ML/MIN/1.73/M2
GLOBULIN SER-MCNC: 4.1 GM/DL (ref 2.4–3.5)
GLUCOSE SERPL-MCNC: 278 MG/DL (ref 82–115)
HCT VFR BLD AUTO: 29.7 % (ref 42–52)
HGB BLD-MCNC: 9.5 G/DL (ref 14–18)
IMM GRANULOCYTES # BLD AUTO: 0.04 X10(3)/MCL (ref 0–0.04)
IMM GRANULOCYTES NFR BLD AUTO: 0.5 %
LYMPHOCYTES # BLD AUTO: 2.01 X10(3)/MCL (ref 0.6–4.6)
LYMPHOCYTES NFR BLD AUTO: 27.4 %
MCH RBC QN AUTO: 28.3 PG (ref 27–31)
MCHC RBC AUTO-ENTMCNC: 32 G/DL (ref 33–36)
MCV RBC AUTO: 88.4 FL (ref 80–94)
MONOCYTES # BLD AUTO: 0.57 X10(3)/MCL (ref 0.1–1.3)
MONOCYTES NFR BLD AUTO: 7.8 %
NEUTROPHILS # BLD AUTO: 4.36 X10(3)/MCL (ref 2.1–9.2)
NEUTROPHILS NFR BLD AUTO: 59.4 %
NRBC BLD AUTO-RTO: 0 %
PLATELET # BLD AUTO: 397 X10(3)/MCL (ref 130–400)
PMV BLD AUTO: 10.8 FL (ref 7.4–10.4)
POCT GLUCOSE: 155 MG/DL (ref 70–110)
POCT GLUCOSE: 205 MG/DL (ref 70–110)
POCT GLUCOSE: 218 MG/DL (ref 70–110)
POCT GLUCOSE: 260 MG/DL (ref 70–110)
POCT GLUCOSE: 275 MG/DL (ref 70–110)
POTASSIUM SERPL-SCNC: 3.8 MMOL/L (ref 3.5–5.1)
PROT SERPL-MCNC: 6.2 GM/DL (ref 5.8–7.6)
RBC # BLD AUTO: 3.36 X10(6)/MCL (ref 4.7–6.1)
SODIUM SERPL-SCNC: 139 MMOL/L (ref 136–145)
VANCOMYCIN TROUGH SERPL-MCNC: 25.7 UG/ML (ref 15–20)
WBC # BLD AUTO: 7.34 X10(3)/MCL (ref 4.5–11.5)

## 2025-02-19 PROCEDURE — 80202 ASSAY OF VANCOMYCIN: CPT | Performed by: SURGERY

## 2025-02-19 PROCEDURE — 85025 COMPLETE CBC W/AUTO DIFF WBC: CPT

## 2025-02-19 PROCEDURE — 25000003 PHARM REV CODE 250: Performed by: SURGERY

## 2025-02-19 PROCEDURE — 63600175 PHARM REV CODE 636 W HCPCS: Performed by: SURGERY

## 2025-02-19 PROCEDURE — 97530 THERAPEUTIC ACTIVITIES: CPT | Mod: CO

## 2025-02-19 PROCEDURE — 94640 AIRWAY INHALATION TREATMENT: CPT

## 2025-02-19 PROCEDURE — 36415 COLL VENOUS BLD VENIPUNCTURE: CPT | Performed by: SURGERY

## 2025-02-19 PROCEDURE — 94761 N-INVAS EAR/PLS OXIMETRY MLT: CPT

## 2025-02-19 PROCEDURE — 25000003 PHARM REV CODE 250

## 2025-02-19 PROCEDURE — 97112 NEUROMUSCULAR REEDUCATION: CPT

## 2025-02-19 PROCEDURE — 21400001 HC TELEMETRY ROOM

## 2025-02-19 PROCEDURE — 99900022

## 2025-02-19 PROCEDURE — 36415 COLL VENOUS BLD VENIPUNCTURE: CPT

## 2025-02-19 PROCEDURE — 97535 SELF CARE MNGMENT TRAINING: CPT | Mod: CO

## 2025-02-19 PROCEDURE — 27000221 HC OXYGEN, UP TO 24 HOURS

## 2025-02-19 PROCEDURE — 99900026 HC AIRWAY MAINTENANCE (STAT)

## 2025-02-19 PROCEDURE — 80053 COMPREHEN METABOLIC PANEL: CPT

## 2025-02-19 PROCEDURE — 99900035 HC TECH TIME PER 15 MIN (STAT)

## 2025-02-19 PROCEDURE — 25000242 PHARM REV CODE 250 ALT 637 W/ HCPCS: Performed by: SURGERY

## 2025-02-19 PROCEDURE — 94760 N-INVAS EAR/PLS OXIMETRY 1: CPT

## 2025-02-19 PROCEDURE — 63600175 PHARM REV CODE 636 W HCPCS

## 2025-02-19 PROCEDURE — 99900031 HC PATIENT EDUCATION (STAT)

## 2025-02-19 RX ADMIN — AMANTADINE HYDROCHLORIDE 100 MG: 50 SOLUTION ORAL at 08:02

## 2025-02-19 RX ADMIN — FAMOTIDINE 20 MG: 20 TABLET, FILM COATED ORAL at 08:02

## 2025-02-19 RX ADMIN — IPRATROPIUM BROMIDE AND ALBUTEROL SULFATE 3 ML: 2.5; .5 SOLUTION RESPIRATORY (INHALATION) at 11:02

## 2025-02-19 RX ADMIN — FOLIC ACID 1 MG: 1 TABLET ORAL at 08:02

## 2025-02-19 RX ADMIN — METHYLPHENIDATE HYDROCHLORIDE 20 MG: 5 TABLET ORAL at 11:02

## 2025-02-19 RX ADMIN — INSULIN GLARGINE 40 UNITS: 100 INJECTION, SOLUTION SUBCUTANEOUS at 08:02

## 2025-02-19 RX ADMIN — IPRATROPIUM BROMIDE AND ALBUTEROL SULFATE 3 ML: 2.5; .5 SOLUTION RESPIRATORY (INHALATION) at 03:02

## 2025-02-19 RX ADMIN — IPRATROPIUM BROMIDE AND ALBUTEROL SULFATE 3 ML: 2.5; .5 SOLUTION RESPIRATORY (INHALATION) at 08:02

## 2025-02-19 RX ADMIN — METHOCARBAMOL 500 MG: 500 TABLET ORAL at 09:02

## 2025-02-19 RX ADMIN — PIPERACILLIN SODIUM AND TAZOBACTAM SODIUM 4.5 G: 4; .5 INJECTION, POWDER, LYOPHILIZED, FOR SOLUTION INTRAVENOUS at 01:02

## 2025-02-19 RX ADMIN — CIPROFLOXACIN 400 MG: 2 INJECTION, SOLUTION INTRAVENOUS at 03:02

## 2025-02-19 RX ADMIN — VANCOMYCIN HYDROCHLORIDE 1250 MG: 1.25 INJECTION, POWDER, LYOPHILIZED, FOR SOLUTION INTRAVENOUS at 12:02

## 2025-02-19 RX ADMIN — METHOCARBAMOL 500 MG: 500 TABLET ORAL at 01:02

## 2025-02-19 RX ADMIN — SODIUM CHLORIDE SOLN NEBU 3% 2 ML: 3 NEBU SOLN at 07:02

## 2025-02-19 RX ADMIN — INSULIN ASPART 6 UNITS: 100 INJECTION, SOLUTION INTRAVENOUS; SUBCUTANEOUS at 06:02

## 2025-02-19 RX ADMIN — LOSARTAN POTASSIUM 100 MG: 50 TABLET, FILM COATED ORAL at 09:02

## 2025-02-19 RX ADMIN — METOPROLOL TARTRATE 50 MG: 50 TABLET, FILM COATED ORAL at 09:02

## 2025-02-19 RX ADMIN — AMANTADINE HYDROCHLORIDE 100 MG: 50 SOLUTION ORAL at 09:02

## 2025-02-19 RX ADMIN — IPRATROPIUM BROMIDE AND ALBUTEROL SULFATE 3 ML: 2.5; .5 SOLUTION RESPIRATORY (INHALATION) at 07:02

## 2025-02-19 RX ADMIN — SODIUM CHLORIDE SOLN NEBU 3% 2 ML: 3 NEBU SOLN at 03:02

## 2025-02-19 RX ADMIN — METHOCARBAMOL 500 MG: 500 TABLET ORAL at 05:02

## 2025-02-19 RX ADMIN — CIPROFLOXACIN 400 MG: 2 INJECTION, SOLUTION INTRAVENOUS at 02:02

## 2025-02-19 RX ADMIN — INSULIN GLARGINE 40 UNITS: 100 INJECTION, SOLUTION SUBCUTANEOUS at 09:02

## 2025-02-19 RX ADMIN — PIPERACILLIN SODIUM AND TAZOBACTAM SODIUM 4.5 G: 4; .5 INJECTION, POWDER, LYOPHILIZED, FOR SOLUTION INTRAVENOUS at 12:02

## 2025-02-19 RX ADMIN — INSULIN ASPART 9 UNITS: 100 INJECTION, SOLUTION INTRAVENOUS; SUBCUTANEOUS at 11:02

## 2025-02-19 RX ADMIN — VANCOMYCIN HYDROCHLORIDE 1250 MG: 1.25 INJECTION, POWDER, LYOPHILIZED, FOR SOLUTION INTRAVENOUS at 09:02

## 2025-02-19 RX ADMIN — SODIUM CHLORIDE SOLN NEBU 3% 2 ML: 3 NEBU SOLN at 11:02

## 2025-02-19 RX ADMIN — METHYLPHENIDATE HYDROCHLORIDE 20 MG: 5 TABLET ORAL at 06:02

## 2025-02-19 RX ADMIN — THIAMINE HCL TAB 100 MG 100 MG: 100 TAB at 08:02

## 2025-02-19 RX ADMIN — METOPROLOL TARTRATE 50 MG: 50 TABLET, FILM COATED ORAL at 08:02

## 2025-02-19 RX ADMIN — VANCOMYCIN HYDROCHLORIDE 1250 MG: 1.25 INJECTION, POWDER, LYOPHILIZED, FOR SOLUTION INTRAVENOUS at 05:02

## 2025-02-19 RX ADMIN — FAMOTIDINE 20 MG: 20 TABLET, FILM COATED ORAL at 09:02

## 2025-02-19 RX ADMIN — ENOXAPARIN SODIUM 40 MG: 40 INJECTION SUBCUTANEOUS at 08:02

## 2025-02-19 RX ADMIN — ENOXAPARIN SODIUM 40 MG: 40 INJECTION SUBCUTANEOUS at 09:02

## 2025-02-19 RX ADMIN — AMLODIPINE BESYLATE 10 MG: 5 TABLET ORAL at 11:02

## 2025-02-19 RX ADMIN — SODIUM CHLORIDE SOLN NEBU 3% 2 ML: 3 NEBU SOLN at 08:02

## 2025-02-19 RX ADMIN — THERA TABS 1 TABLET: TAB at 08:02

## 2025-02-19 NOTE — PLAN OF CARE
[facility]  Email   [email or web address]  Additional update that pt has sensitivities pending that will be resulted in the am. Brenton will know then what med pt would need to be on; oral vs IV etc.

## 2025-02-19 NOTE — PROGRESS NOTES
Trauma Surgery   Daily Progress Note     HD#15  POD#15 Days Post-Op    Subjective  Trach/PEG in place  TF infusing  Appropriate nodding to questions  AF x 24 hours  Resp culture with Strep, remaining negative, sensitivities pending  WBC WNL     Scheduled Meds:   albuterol-ipratropium  3 mL Nebulization Q4H    amantadine HCL  100 mg Per G Tube BID    amLODIPine  10 mg Per G Tube Daily    ciprofloxacin  400 mg Intravenous Q12H    enoxparin  40 mg Subcutaneous Q12H (prophylaxis, 0900/2100)    famotidine  20 mg Per G Tube BID    folic acid  1 mg Per G Tube Daily    insulin glargine U-100  40 Units Subcutaneous BID    losartan  100 mg Per G Tube QHS    methocarbamoL  500 mg Per G Tube Q8H    methylphenidate HCl  20 mg Per G Tube BID WM    metoprolol tartrate  50 mg Per G Tube BID    multivitamin  1 tablet Per G Tube Daily    piperacillin-tazobactam (Zosyn) IV (PEDS and ADULTS) (extended infusion is not appropriate)  4.5 g Intravenous Q8H    sodium chloride 3%  2 mL Nebulization Q4H    thiamine  100 mg Per G Tube Daily    vancomycin 1,250 mg in D5W 250 mL IVPB (admixture device)  1,250 mg Intravenous Q8H       Continuous Infusions:   D10W   Intravenous Continuous PRN           PRN Meds:  Current Facility-Administered Medications:     acetaminophen, 650 mg, Per G Tube, Q4H PRN    acetaminophen, 650 mg, Rectal, Q4H PRN    artificial tears, 1 drop, Both Eyes, PRN    bisacodyL, 10 mg, Rectal, Daily PRN    ceFAZolin (Ancef) IV (PEDS and ADULTS), 2 g, Intravenous, On Call Procedure    D10W, , Intravenous, Continuous PRN    dextrose 50%, 12.5 g, Intravenous, PRN    dextrose 50%, 25 g, Intravenous, PRN    enalaprilat, 1.25 mg, Intravenous, Q4H PRN    fentaNYL, 50 mcg, Intravenous, Q2H PRN    glucagon (human recombinant), 1 mg, Intramuscular, PRN    glucagon (human recombinant), 1 mg, Intramuscular, PRN    hydrALAZINE, 10 mg, Intravenous, Q4H PRN    insulin aspart U-100, 0-15 Units, Subcutaneous, Q6H PRN    labetaloL, 20 mg,  Intravenous, Q4H PRN    LORazepam, 2 mg, Per G Tube, Q4H PRN    ondansetron, 4 mg, Intravenous, Q6H PRN    oxyCODONE, 5 mg, Per G Tube, Q4H PRN    prochlorperazine, 5 mg, Intravenous, Q6H PRN    Pharmacy to dose Vancomycin consult, , , Once **AND** vancomycin - pharmacy to dose, , Intravenous, pharmacy to manage frequency     Objective  Temp:  [98.2 °F (36.8 °C)-99.2 °F (37.3 °C)] 98.2 °F (36.8 °C)  Pulse:  [82-98] 90  Resp:  [17-32] 18  SpO2:  [94 %-99 %] 97 %  BP: (101-120)/(60-72) 106/62     Gen: NAD, awake, tracking  HEENT: EOMI, NCAT  CV: RR  Resp: no shortness of breath, normal WOB   Abd: soft, non-distended, ATTP, PEG in place  Ext:  Full ROM. No deformity.      Labs  Recent Labs     02/17/25 0329 02/18/25 0429 02/19/25  0422   WBC 12.17* 8.80 7.34   HGB 10.1* 9.3* 9.5*   HCT 31.9* 30.3* 29.7*   * 365 397     Recent Labs     02/17/25 0329 02/18/25  0429 02/19/25  0422    143 139   K 4.1 4.1 3.8   * 109* 106   CO2 27 25 26   BUN 29.9* 30.2* 23.4   CREATININE 0.71* 0.67* 0.69*   CALCIUM 8.6* 8.0* 8.5*   ALBUMIN 2.1* 2.0* 2.1*   BILITOT <0.5 0.4 0.4   AST 18 19 20   ALKPHOS 75 67 73   ALT 18 19 20     Recent Labs     02/17/25  1600 02/17/25  2024 02/18/25  0017 02/18/25  0404 02/18/25  1102 02/18/25  1602 02/18/25  2226 02/19/25  0556   POCTGLUCOSE 179* 187* 224* 236* 218* 155* 218* 260*        Imaging  No results found in the last 24 hours.       Assessment/Plan  Trach  Trach collar PRN maintain sats > 92%  Routine trach care     PEG  TF per nutrition recommendations  Can be removed 6 weeks after placement if tolerating diet    SDH/IPH, SAH s/p crani  On Lovenox  Neuro rehab  All staples out  BP < 160    Rib fx  MMPC    Fevers/Leukocytosis  Resp cultures positive, continue abx for now pending sensitivities    Vfib arrest  K > 4, Mag > 2 per cardiology    Fall  Neuro rehab  Mon/Thurs labs  PT/OT  Neuro rehab    Brenton Jay  Trauma Surgery   c - [phone]

## 2025-02-19 NOTE — PLAN OF CARE
Problem: Adult Inpatient Plan of Care  Goal: Optimal Comfort and Wellbeing  Outcome: Progressing  Intervention: Provide Person-Centered Care  Flowsheets (Taken 2/19/2025 0307)  Trust Relationship/Rapport:   care explained   choices provided   thoughts/feelings acknowledged   emotional support provided   empathic listening provided   questions answered   questions encouraged   reassurance provided     Problem: Infection  Goal: Absence of Infection Signs and Symptoms  Outcome: Progressing  Intervention: Prevent or Manage Infection  Flowsheets (Taken 2/19/2025 0307)  Fever Reduction/Comfort Measures:   lightweight bedding   lightweight clothing     Problem: Artificial Airway  Goal: Optimal Device Function  Outcome: Progressing  Intervention: Optimize Device Care and Function  Flowsheets (Taken 2/19/2025 0307)  Airway/Ventilation Management:   airway patency maintained   humidification applied   pulmonary hygiene promoted  Aspiration Precautions: NPO pending swallow screening/evaluation  Airway Safety Measures:   mask valve resuscitator at bedside   oxygen flowmeter at bedside   suction at bedside  Oral Care: suction provided

## 2025-02-19 NOTE — PT/OT/SLP PROGRESS
Physical Therapy Treatment    Patient Name:  Ezequiel Ramires   MRN:  [id number]    Recommendations:     Discharge therapy intensity: High Intensity Therapy in a neuro setting  Discharge Equipment Recommendations: to be determined by next level of care  Barriers to discharge: Impaired mobility and Ongoing medical needs    Assessment:     Ezequiel Ramires is a 65 y.o. male admitted with a medical diagnosis of B SDH s/p R craniectomy, SAH, IPH, L occipital bone fx, R 4th rib fx. Patient developed ST with bigeminy; is now s/p trach and PEG placement.  He presents with the following impairments/functional limitations: weakness, impaired self care skills, impaired functional mobility, decreased lower extremity function, impaired endurance, impaired balance . Pt tolerated 15 minutes standing in tilt bed to improve standing/upright tolerance, prolonged WBing through BLE, postural strengthening of the trunk and cervical musculature. Good efforts but does get fatigued with activity. Vitals stable throughout. Cont to recommend high intensity therapy in a neuro specialized setting.    Rehab Prognosis: Good; patient would benefit from acute skilled PT services to address these deficits and reach maximum level of function.    Recent Surgery: Procedure(s) (LRB):  CRANIOTOMY, FOR SUBDURAL HEMATOMA EVACUATION (Right) 15 Days Post-Op    Plan:     During this hospitalization, patient would benefit from acute PT services 6 x/week to address the identified rehab impairments via gait training, therapeutic activities, therapeutic exercises, neuromuscular re-education, wheelchair management/training and progress toward the following goals:    Plan of Care Expires:  03/08/25    Subjective     Chief Complaint: none, gives thumbs up on command with RUE  Patient/Family Comments/goals: PLOF  Pain/Comfort:  Pain Rating 1: 0/10      Objective:     Communicated with RN prior to session.  Patient found HOB elevated with blood pressure cuff, peripheral IV, pulse  ox (continuous), PureWick, PEG Tube, Tracheostomy, oxygen upon PT entry to room.     General Precautions: Standard, fall (BP< 150/90, no bone flap R side)  Orthopedic Precautions: N/A  Braces:  (helmet)  Respiratory Status:  trach collar 6L SpO2 98%  Blood Pressure:   Supine 110/73  Tilt at 40 deg 126/72 HR 96  Skin Integrity: Visible skin intact      Therapeutic Activities/Exercises:  15 minutes of standing in VitalGo bed, gradual increase from 0-40-80 deg tilt with stable vitals.   See OT note for UE command following/ADLS.   Pt with R lateral trunk lean and preference for R head turn in standing, worse with fatigue. Propped pt to midline with wedge and towel roll.  Emphasis on head/visual tracking to L side. Pt required AAROM to cross midline to the L with head turns.   Pt smiled on command x / attempt.  Attempted to have pt wiggle toes (B) in standing however pt unable.   With tapping facilitation and verbal cues pt performed 6 (B) quad sets in standing.  Pt with (+) BM upon return to supine, CNA notified.    Education:  Patient and spouse were provided with verbal education education regarding PT role/goals/POC.  Understanding was verbalized.     Patient left HOB elevated with all lines intact, call button in reach, pressure relief boots, nsg notified, and wife present    GOALS:   Multidisciplinary Problems       Physical Therapy Goals          Problem: Physical Therapy    Goal Priority Disciplines Outcome Interventions   Physical Therapy Goal     PT, PT/OT Progressing    Description: Goals to be met by: d/c     Patient will increase functional independence with mobility by performin. Supine to sit with MInimal Assistance  2. Sit to supine with MInimal Assistance  3. Sit to stand transfer with Minimal Assistance  4. Bed to chair transfer with Minimal Assistance using LRAD vs pivot  5. 10' with B HHA MaxA                          Time Tracking:     PT Received On: 25  PT Start Time: 948     PT  Stop Time: 1034  PT Total Time (min): 46 min     Billable Minutes: Neuromuscular Re-education 46 min    Treatment Type: Treatment  PT/PTA: PT     Number of PTA visits since last PT visit: 4     02/19/2025

## 2025-02-19 NOTE — PROGRESS NOTES
Trauma Surgery   Daily Progress Note     HD#15  POD#15 Days Post-Op    Subjective  Trach/PEG in place  TF infusing  Appropriate nodding to questions  AF x 24 hours  Resp culture with Strep, remaining negative, sensitivities pending  WBC WNL     Scheduled Meds:   albuterol-ipratropium  3 mL Nebulization Q4H    amantadine HCL  100 mg Per G Tube BID    amLODIPine  10 mg Per G Tube Daily    ciprofloxacin  400 mg Intravenous Q12H    enoxparin  40 mg Subcutaneous Q12H (prophylaxis, 0900/2100)    famotidine  20 mg Per G Tube BID    folic acid  1 mg Per G Tube Daily    insulin glargine U-100  40 Units Subcutaneous BID    losartan  100 mg Per G Tube QHS    methocarbamoL  500 mg Per G Tube Q8H    methylphenidate HCl  20 mg Per G Tube BID WM    metoprolol tartrate  50 mg Per G Tube BID    multivitamin  1 tablet Per G Tube Daily    piperacillin-tazobactam (Zosyn) IV (PEDS and ADULTS) (extended infusion is not appropriate)  4.5 g Intravenous Q8H    sodium chloride 3%  2 mL Nebulization Q4H    thiamine  100 mg Per G Tube Daily    vancomycin 1,250 mg in D5W 250 mL IVPB (admixture device)  1,250 mg Intravenous Q8H       Continuous Infusions:   D10W   Intravenous Continuous PRN           PRN Meds:  Current Facility-Administered Medications:     acetaminophen, 650 mg, Per G Tube, Q4H PRN    acetaminophen, 650 mg, Rectal, Q4H PRN    artificial tears, 1 drop, Both Eyes, PRN    bisacodyL, 10 mg, Rectal, Daily PRN    ceFAZolin (Ancef) IV (PEDS and ADULTS), 2 g, Intravenous, On Call Procedure    D10W, , Intravenous, Continuous PRN    dextrose 50%, 12.5 g, Intravenous, PRN    dextrose 50%, 25 g, Intravenous, PRN    enalaprilat, 1.25 mg, Intravenous, Q4H PRN    fentaNYL, 50 mcg, Intravenous, Q2H PRN    glucagon (human recombinant), 1 mg, Intramuscular, PRN    glucagon (human recombinant), 1 mg, Intramuscular, PRN    hydrALAZINE, 10 mg, Intravenous, Q4H PRN    insulin aspart U-100, 0-15 Units, Subcutaneous, Q6H PRN    labetaloL, 20 mg,  Intravenous, Q4H PRN    LORazepam, 2 mg, Per G Tube, Q4H PRN    ondansetron, 4 mg, Intravenous, Q6H PRN    oxyCODONE, 5 mg, Per G Tube, Q4H PRN    prochlorperazine, 5 mg, Intravenous, Q6H PRN    Pharmacy to dose Vancomycin consult, , , Once **AND** vancomycin - pharmacy to dose, , Intravenous, pharmacy to manage frequency     Objective  Temp:  [98.2 °F (36.8 °C)-99.2 °F (37.3 °C)] 98.2 °F (36.8 °C)  Pulse:  [82-98] 90  Resp:  [17-32] 18  SpO2:  [94 %-99 %] 97 %  BP: (101-120)/(60-72) 106/62     Gen: NAD, awake, tracking  HEENT: EOMI, NCAT  CV: RR  Resp: no shortness of breath, normal WOB   Abd: soft, non-distended, ATTP, PEG in place  Ext:  Full ROM. No deformity.      Labs  Recent Labs     02/17/25 0329 02/18/25 0429 02/19/25  0422   WBC 12.17* 8.80 7.34   HGB 10.1* 9.3* 9.5*   HCT 31.9* 30.3* 29.7*   * 365 397     Recent Labs     02/17/25 0329 02/18/25  0429 02/19/25  0422    143 139   K 4.1 4.1 3.8   * 109* 106   CO2 27 25 26   BUN 29.9* 30.2* 23.4   CREATININE 0.71* 0.67* 0.69*   CALCIUM 8.6* 8.0* 8.5*   ALBUMIN 2.1* 2.0* 2.1*   BILITOT <0.5 0.4 0.4   AST 18 19 20   ALKPHOS 75 67 73   ALT 18 19 20     Recent Labs     02/17/25  1600 02/17/25  2024 02/18/25  0017 02/18/25  0404 02/18/25  1102 02/18/25  1602 02/18/25  2226 02/19/25  0556   POCTGLUCOSE 179* 187* 224* 236* 218* 155* 218* 260*        Imaging  No results found in the last 24 hours.       Assessment/Plan  Trach  Trach collar PRN maintain sats > 92%  Routine trach care     PEG  TF per nutrition recommendations  Can be removed 6 weeks after placement if tolerating diet    SDH/IPH, SAH s/p crani  On Lovenox  Neuro rehab  All staples out  BP < 160    Rib fx  MMPC    Fevers/Leukocytosis  Resp cultures positive, continue abx for now pending sensitivities    Vfib arrest  K > 4, Mag > 2 per cardiology    Fall  Neuro rehab  Mon/Thurs labs  PT/OT  Neuro rehab    Brenton Jay  Trauma Surgery   c - [phone]

## 2025-02-19 NOTE — NURSING
7 staples removed from right side of head without difficulty. Patient tolerated well. No open areas noted. Incision healed.

## 2025-02-20 LAB
ALBUMIN SERPL-MCNC: 2.4 G/DL (ref 3.4–4.8)
ALBUMIN/GLOB SERPL: 0.7 RATIO (ref 1.1–2)
ALP SERPL-CCNC: 86 UNIT/L (ref 40–150)
ALT SERPL-CCNC: 27 UNIT/L (ref 0–55)
ANION GAP SERPL CALC-SCNC: 9 MEQ/L
AST SERPL-CCNC: 24 UNIT/L (ref 5–34)
BACTERIA SPEC CULT: ABNORMAL
BASOPHILS # BLD AUTO: 0.06 X10(3)/MCL
BASOPHILS NFR BLD AUTO: 0.7 %
BILIRUB SERPL-MCNC: 0.4 MG/DL
BUN SERPL-MCNC: 27 MG/DL (ref 8.4–25.7)
CALCIUM SERPL-MCNC: 8.4 MG/DL (ref 8.8–10)
CHLORIDE SERPL-SCNC: 101 MMOL/L (ref 98–107)
CO2 SERPL-SCNC: 26 MMOL/L (ref 23–31)
CREAT SERPL-MCNC: 0.65 MG/DL (ref 0.72–1.25)
CREAT/UREA NIT SERPL: 42
CRP SERPL-MCNC: 49.5 MG/L
EOSINOPHIL # BLD AUTO: 0.29 X10(3)/MCL (ref 0–0.9)
EOSINOPHIL NFR BLD AUTO: 3.5 %
ERYTHROCYTE [DISTWIDTH] IN BLOOD BY AUTOMATED COUNT: 14.2 % (ref 11.5–17)
GFR SERPLBLD CREATININE-BSD FMLA CKD-EPI: >60 ML/MIN/1.73/M2
GLOBULIN SER-MCNC: 3.4 GM/DL (ref 2.4–3.5)
GLUCOSE SERPL-MCNC: 269 MG/DL (ref 82–115)
GRAM STN SPEC: ABNORMAL
HCT VFR BLD AUTO: 32.3 % (ref 42–52)
HGB BLD-MCNC: 10.3 G/DL (ref 14–18)
IMM GRANULOCYTES # BLD AUTO: 0.09 X10(3)/MCL (ref 0–0.04)
IMM GRANULOCYTES NFR BLD AUTO: 1.1 %
KOH PREP SPEC: NORMAL
LYMPHOCYTES # BLD AUTO: 2 X10(3)/MCL (ref 0.6–4.6)
LYMPHOCYTES NFR BLD AUTO: 23.9 %
MCH RBC QN AUTO: 28.1 PG (ref 27–31)
MCHC RBC AUTO-ENTMCNC: 31.9 G/DL (ref 33–36)
MCV RBC AUTO: 88.3 FL (ref 80–94)
MONOCYTES # BLD AUTO: 0.52 X10(3)/MCL (ref 0.1–1.3)
MONOCYTES NFR BLD AUTO: 6.2 %
NEUTROPHILS # BLD AUTO: 5.41 X10(3)/MCL (ref 2.1–9.2)
NEUTROPHILS NFR BLD AUTO: 64.6 %
NRBC BLD AUTO-RTO: 0 %
PLATELET # BLD AUTO: 423 X10(3)/MCL (ref 130–400)
PMV BLD AUTO: 10.8 FL (ref 7.4–10.4)
POCT GLUCOSE: 243 MG/DL (ref 70–110)
POCT GLUCOSE: 280 MG/DL (ref 70–110)
POCT GLUCOSE: 283 MG/DL (ref 70–110)
POTASSIUM SERPL-SCNC: 4.3 MMOL/L (ref 3.5–5.1)
PREALB SERPL-MCNC: 19.2 MG/DL (ref 16–42)
PROT SERPL-MCNC: 5.8 GM/DL (ref 5.8–7.6)
RBC # BLD AUTO: 3.66 X10(6)/MCL (ref 4.7–6.1)
SODIUM SERPL-SCNC: 136 MMOL/L (ref 136–145)
VANCOMYCIN TROUGH SERPL-MCNC: 17.8 UG/ML (ref 15–20)
WBC # BLD AUTO: 8.37 X10(3)/MCL (ref 4.5–11.5)

## 2025-02-20 PROCEDURE — 94761 N-INVAS EAR/PLS OXIMETRY MLT: CPT

## 2025-02-20 PROCEDURE — 63600175 PHARM REV CODE 636 W HCPCS: Performed by: NURSE PRACTITIONER

## 2025-02-20 PROCEDURE — 25000003 PHARM REV CODE 250: Performed by: NURSE PRACTITIONER

## 2025-02-20 PROCEDURE — 99900031 HC PATIENT EDUCATION (STAT)

## 2025-02-20 PROCEDURE — 25000003 PHARM REV CODE 250

## 2025-02-20 PROCEDURE — 27000221 HC OXYGEN, UP TO 24 HOURS

## 2025-02-20 PROCEDURE — 99900026 HC AIRWAY MAINTENANCE (STAT)

## 2025-02-20 PROCEDURE — 86140 C-REACTIVE PROTEIN: CPT

## 2025-02-20 PROCEDURE — 25000242 PHARM REV CODE 250 ALT 637 W/ HCPCS: Performed by: SURGERY

## 2025-02-20 PROCEDURE — 80053 COMPREHEN METABOLIC PANEL: CPT

## 2025-02-20 PROCEDURE — 97530 THERAPEUTIC ACTIVITIES: CPT | Mod: CO

## 2025-02-20 PROCEDURE — 25000003 PHARM REV CODE 250: Performed by: SURGERY

## 2025-02-20 PROCEDURE — 63600175 PHARM REV CODE 636 W HCPCS

## 2025-02-20 PROCEDURE — 94640 AIRWAY INHALATION TREATMENT: CPT

## 2025-02-20 PROCEDURE — 85025 COMPLETE CBC W/AUTO DIFF WBC: CPT

## 2025-02-20 PROCEDURE — 36415 COLL VENOUS BLD VENIPUNCTURE: CPT

## 2025-02-20 PROCEDURE — 80202 ASSAY OF VANCOMYCIN: CPT | Performed by: SURGERY

## 2025-02-20 PROCEDURE — 84134 ASSAY OF PREALBUMIN: CPT

## 2025-02-20 PROCEDURE — 99900035 HC TECH TIME PER 15 MIN (STAT)

## 2025-02-20 PROCEDURE — 36415 COLL VENOUS BLD VENIPUNCTURE: CPT | Performed by: SURGERY

## 2025-02-20 PROCEDURE — 63600175 PHARM REV CODE 636 W HCPCS: Performed by: SURGERY

## 2025-02-20 PROCEDURE — 21400001 HC TELEMETRY ROOM

## 2025-02-20 PROCEDURE — 94760 N-INVAS EAR/PLS OXIMETRY 1: CPT

## 2025-02-20 PROCEDURE — 97530 THERAPEUTIC ACTIVITIES: CPT | Mod: CQ

## 2025-02-20 PROCEDURE — 87220 TISSUE EXAM FOR FUNGI: CPT | Performed by: SURGERY

## 2025-02-20 RX ORDER — INSULIN GLARGINE 100 [IU]/ML
45 INJECTION, SOLUTION SUBCUTANEOUS 2 TIMES DAILY
Status: DISCONTINUED | OUTPATIENT
Start: 2025-02-20 | End: 2025-02-21 | Stop reason: HOSPADM

## 2025-02-20 RX ORDER — FUROSEMIDE 10 MG/ML
20 INJECTION INTRAMUSCULAR; INTRAVENOUS ONCE
Status: COMPLETED | OUTPATIENT
Start: 2025-02-20 | End: 2025-02-20

## 2025-02-20 RX ADMIN — THERA TABS 1 TABLET: TAB at 10:02

## 2025-02-20 RX ADMIN — FUROSEMIDE 20 MG: 10 INJECTION, SOLUTION INTRAMUSCULAR; INTRAVENOUS at 10:02

## 2025-02-20 RX ADMIN — INSULIN GLARGINE 40 UNITS: 100 INJECTION, SOLUTION SUBCUTANEOUS at 10:02

## 2025-02-20 RX ADMIN — ENOXAPARIN SODIUM 40 MG: 40 INJECTION SUBCUTANEOUS at 09:02

## 2025-02-20 RX ADMIN — FAMOTIDINE 20 MG: 20 TABLET, FILM COATED ORAL at 09:02

## 2025-02-20 RX ADMIN — IPRATROPIUM BROMIDE AND ALBUTEROL SULFATE 3 ML: 2.5; .5 SOLUTION RESPIRATORY (INHALATION) at 05:02

## 2025-02-20 RX ADMIN — INSULIN ASPART 6 UNITS: 100 INJECTION, SOLUTION INTRAVENOUS; SUBCUTANEOUS at 04:02

## 2025-02-20 RX ADMIN — AMLODIPINE BESYLATE 10 MG: 5 TABLET ORAL at 10:02

## 2025-02-20 RX ADMIN — FAMOTIDINE 20 MG: 20 TABLET, FILM COATED ORAL at 10:02

## 2025-02-20 RX ADMIN — CIPROFLOXACIN 400 MG: 2 INJECTION, SOLUTION INTRAVENOUS at 03:02

## 2025-02-20 RX ADMIN — INSULIN GLARGINE 45 UNITS: 100 INJECTION, SOLUTION SUBCUTANEOUS at 09:02

## 2025-02-20 RX ADMIN — SODIUM CHLORIDE SOLN NEBU 3% 2 ML: 3 NEBU SOLN at 04:02

## 2025-02-20 RX ADMIN — METHYLPHENIDATE HYDROCHLORIDE 20 MG: 5 TABLET ORAL at 06:02

## 2025-02-20 RX ADMIN — METHOCARBAMOL 500 MG: 500 TABLET ORAL at 09:02

## 2025-02-20 RX ADMIN — IPRATROPIUM BROMIDE AND ALBUTEROL SULFATE 3 ML: 2.5; .5 SOLUTION RESPIRATORY (INHALATION) at 12:02

## 2025-02-20 RX ADMIN — VANCOMYCIN HYDROCHLORIDE 1250 MG: 1.25 INJECTION, POWDER, LYOPHILIZED, FOR SOLUTION INTRAVENOUS at 10:02

## 2025-02-20 RX ADMIN — VANCOMYCIN HYDROCHLORIDE 1250 MG: 1.25 INJECTION, POWDER, LYOPHILIZED, FOR SOLUTION INTRAVENOUS at 01:02

## 2025-02-20 RX ADMIN — AMANTADINE HYDROCHLORIDE 100 MG: 50 SOLUTION ORAL at 10:02

## 2025-02-20 RX ADMIN — METHOCARBAMOL 500 MG: 500 TABLET ORAL at 04:02

## 2025-02-20 RX ADMIN — METHYLPHENIDATE HYDROCHLORIDE 20 MG: 5 TABLET ORAL at 10:02

## 2025-02-20 RX ADMIN — SODIUM CHLORIDE SOLN NEBU 3% 2 ML: 3 NEBU SOLN at 12:02

## 2025-02-20 RX ADMIN — IPRATROPIUM BROMIDE AND ALBUTEROL SULFATE 3 ML: 2.5; .5 SOLUTION RESPIRATORY (INHALATION) at 08:02

## 2025-02-20 RX ADMIN — THIAMINE HCL TAB 100 MG 100 MG: 100 TAB at 10:02

## 2025-02-20 RX ADMIN — SODIUM CHLORIDE SOLN NEBU 3% 2 ML: 3 NEBU SOLN at 05:02

## 2025-02-20 RX ADMIN — METHOCARBAMOL 500 MG: 500 TABLET ORAL at 03:02

## 2025-02-20 RX ADMIN — LOSARTAN POTASSIUM 100 MG: 50 TABLET, FILM COATED ORAL at 09:02

## 2025-02-20 RX ADMIN — SODIUM CHLORIDE SOLN NEBU 3% 2 ML: 3 NEBU SOLN at 08:02

## 2025-02-20 RX ADMIN — METOPROLOL TARTRATE 50 MG: 50 TABLET, FILM COATED ORAL at 10:02

## 2025-02-20 RX ADMIN — IPRATROPIUM BROMIDE AND ALBUTEROL SULFATE 3 ML: 2.5; .5 SOLUTION RESPIRATORY (INHALATION) at 04:02

## 2025-02-20 RX ADMIN — METOPROLOL TARTRATE 50 MG: 50 TABLET, FILM COATED ORAL at 09:02

## 2025-02-20 RX ADMIN — FOLIC ACID 1 MG: 1 TABLET ORAL at 10:02

## 2025-02-20 RX ADMIN — LEVOFLOXACIN 750 MG: 500 TABLET, FILM COATED ORAL at 03:02

## 2025-02-20 NOTE — PT/OT/SLP PROGRESS
Occupational Therapy   Treatment    Name: Ezequiel Ramires  MRN: [id number]    Recommendations:     Recommended therapy intensity at discharge: High Intensity Therapy   Discharge Equipment Recommendations:  to be determined by next level of care  Barriers to discharge:       Assessment:     Ezequiel Ramires is a 65 y.o. male with a medical diagnosis of B SDH s/p R craniectomy, SAH, IPH, L occipital bone fx, R 4th rib fx. Patient developed ST with bigeminy; is now s/p trach and PEG placement. Performance deficits affecting function are weakness, impaired endurance, impaired self care skills, impaired functional mobility, gait instability, impaired balance, impaired cognition, decreased upper extremity function, decreased lower extremity function, decreased safety awareness, decreased coordination, decreased ROM. Pt tolerated 20 minutes in [facility] bed during session while addressing command following, BUE ROM and ADLs. Pt is continuing to progress towards goals. Would benefit from high intensity therapy in a neuro setting at d/c.     Rehab Prognosis:  Good; patient would benefit from acute skilled OT services to address these deficits and reach maximum level of function.       Plan:     Patient to be seen 5 x/week to address the above listed problems via self-care/home management, therapeutic activities, therapeutic exercises, neuromuscular re-education, cognitive retraining  Plan of Care Expires: 03/07/25  Plan of Care Reviewed with: patient, spouse    Subjective     Pain/Comfort:  Pain Rating 1: 0/10    Objective:     Communicated with: RN prior to session.  Patient found left sidelying with Tracheostomy, oxygen, PEG Tube, PureWick, telemetry, pulse ox (continuous), peripheral IV upon OT entry to room.    General Precautions: Standard, fall (BP < 150/90; bone flap pxs)    Orthopedic Precautions:N/A  Braces:  (helmet)  Respiratory Status: t-piece 10L  Vital Signs: Blood Pressure: 110/72     Occupational Performance:     Bed  Mobility:    Patient completed Scooting/Bridging with total assistance     Therapeutic Activities:  Pt spent 20 minutes in standing position (~80 deg) with use of VitalGo bed. Performed A/AAROM of BUE. Worked on grasp/release of items and ZHAO coordination. Pt able to grab items from therapist with L hand and perform hand-to-hand translation with tactile support at wrists to keep hands in functional position. Pt was also given VC for tracking into ZHAO visual fields and visually locating objects on L side. Noted with inattention. Pt followed all simple commands this date after verbal cueing.     Therapeutic Positioning    OT interventions performed during the course of today's session in an effort to prevent and/or reduce acquired pressure injuries:   Therapeutic positioning was provided at the conclusion of session to offload all bony prominences for the prevention and/or reduction of pressure injuries and for contracture prevention    [facility] 6 Click ADL: 11    Patient Education:  Patient and spouse were provided with verbal education education regarding OT role/goals/POC.  Understanding was verbalized.      Patient left with bed in chair position with all lines intact, call button in reach, and wife present.    GOALS:   Multidisciplinary Problems       Occupational Therapy Goals          Problem: Occupational Therapy    Goal Priority Disciplines Outcome Interventions   Occupational Therapy Goal     OT, PT/OT Progressing    Description: LTG: Pt will perform basic ADLs and ADL t/fs with min A using LRAD by d/c.    STG: to be met by 3/7/25  1. Pt will follow 100% of simple one step commands across 3 sessions  2. Pt will perform grooming EOB with min A.   3. Pt will perform functional grasp/reach/release of items >80% of trials in prep for increased participation in ADL tasks.   4. Pt will perform sit<>stand with mod A in prep for ADL t/fs.  5. Pt will perform bedside commode transfer with mod A.                          Time Tracking:     OT Date of Treatment: 02/20/25  OT Start Time: 1259  OT Stop Time: 1347  OT Total Time (min): 48 min    Billable Minutes:Therapeutic Activity 48    OT/WENDI: EWNDI     Number of WENDI visits since last OT visit: 5    2/20/2025

## 2025-02-20 NOTE — PLAN OF CARE
Updates e-mailed to Bessie at [facility]. Awaiting response as to what else she will need in order to continue with transfer to [facility].     Deepa Baird, LCSW    1130 F/u with Bessie at [facility]. She is still following pt and requests she just be updated once sensitivities are back and pt has finalized abx plan.     1500 Received MOT info from Grace at [facility]. Plan for pt to d/c to rehab in the AM. [facility]. Notified Grace, trauma NP, and contacted spouse to provide update. Disc with images already in pt's chart.

## 2025-02-20 NOTE — PT/OT/SLP PROGRESS
Physical Therapy Treatment    Patient Name:  Ezequiel Ramires   MRN:  [id number]    Recommendations:     Discharge therapy intensity: High Intensity Therapy   Discharge Equipment Recommendations: to be determined by next level of care  Barriers to discharge: Impaired mobility, Ongoing medical needs, and placement.    Assessment:     Ezequiel Ramires is a 65 y.o. male admitted with a medical diagnosis of B SDH s/p R craniectomy, SAH, IPH, L occipital bone fx, R 4th rib fx. Patient developed ST with bigeminy; is now s/p trach and PEG placement.  He presents with the following impairments/functional limitations: weakness, impaired self care skills, impaired functional mobility, decreased lower extremity function, impaired endurance, impaired balance.  Pt able to tolerate standing in [facility] bed for 20 minutes. Pt is also able to activate quads and lift toes on command; pt is weaker on LLE than RLE. Pt weaker in RUE, therefore utilizes LUE more and also L gaze rather than R.     Rehab Prognosis: Good; patient would benefit from acute skilled PT services to address these deficits and reach maximum level of function.    Recent Surgery: Procedure(s) (LRB):  CRANIOTOMY, FOR SUBDURAL HEMATOMA EVACUATION (Right) 16 Days Post-Op    Plan:     During this hospitalization, patient would benefit from acute PT services 6 x/week to address the identified rehab impairments via gait training, therapeutic activities, therapeutic exercises, neuromuscular re-education, wheelchair management/training and progress toward the following goals:    Plan of Care Expires:  03/08/25    Subjective     Chief Complaint:   Patient/Family Comments/goals:   Pain/Comfort:         Objective:     Communicated with RN prior to session.  Patient found supine with blood pressure cuff, peripheral IV, pulse ox (continuous), PureWick, PEG Tube, Tracheostomy, oxygen upon PT entry to room.     General Precautions: Standard, fall (BP< 150/90, no bone flap R side)  Orthopedic  Precautions: N/A  Braces:  (helmet)  Respiratory Status:  t- piece 10L  Blood Pressure: 110/72 @ 60 degrees in vitalgo bed  Skin Integrity: Visible skin intact      Therapeutic Activities/Exercises:  [facility]; pt needed max cuing for cervical extension to maintain erect posture.  Pt performed quad activation on BLE on command and able to complete the isometrics for ~3 sec x6.  Pt performed BLE toe flexion and extension x5; LLE is weaker than RLE.     Education:  Patient provided with verbal education education regarding PT role/goals/POC, fall prevention, and safety awareness.  Understanding was verbalized, however additional teaching warranted.     Patient left supine with all lines intact, call button in reach, RN notified, and wife present    GOALS:   Multidisciplinary Problems       Physical Therapy Goals          Problem: Physical Therapy    Goal Priority Disciplines Outcome Interventions   Physical Therapy Goal     PT, PT/OT Progressing    Description: Goals to be met by: d/c     Patient will increase functional independence with mobility by performin. Supine to sit with MInimal Assistance  2. Sit to supine with MInimal Assistance  3. Sit to stand transfer with Minimal Assistance  4. Bed to chair transfer with Minimal Assistance using LRAD vs pivot  5. 10' with B HHA MaxA                          Time Tracking:     PT Received On: 25  PT Start Time: 1300     PT Stop Time: 1346  PT Total Time (min): 46 min     Billable Minutes: Therapeutic Activity 3    Treatment Type: Treatment  PT/PTA: PTA     Number of PTA visits since last PT visit: 5     2025

## 2025-02-20 NOTE — PROGRESS NOTES
Trauma Surgery   Daily Progress Note     HD#16  POD#16 Days Post-Op    Subjective  Trach/PEG in place  TF infusing  Appropriate nodding to questions  AF x 24 hours  Resp culture with Strep, remaining negative, sensitivities pending  WBC WNL  Some swelling to BUE this AM     Scheduled Meds:   albuterol-ipratropium  3 mL Nebulization Q4H    amantadine HCL  100 mg Per G Tube BID    amLODIPine  10 mg Per G Tube Daily    ciprofloxacin  400 mg Intravenous Q12H    enoxparin  40 mg Subcutaneous Q12H (prophylaxis, 0900/2100)    famotidine  20 mg Per G Tube BID    folic acid  1 mg Per G Tube Daily    insulin glargine U-100  40 Units Subcutaneous BID    losartan  100 mg Per G Tube QHS    methocarbamoL  500 mg Per G Tube Q8H    methylphenidate HCl  20 mg Per G Tube BID WM    metoprolol tartrate  50 mg Per G Tube BID    multivitamin  1 tablet Per G Tube Daily    sodium chloride 3%  2 mL Nebulization Q4H    thiamine  100 mg Per G Tube Daily    vancomycin 1,250 mg in D5W 250 mL IVPB (admixture device)  1,250 mg Intravenous Q8H       Continuous Infusions:   D10W   Intravenous Continuous PRN           PRN Meds:  Current Facility-Administered Medications:     acetaminophen, 650 mg, Per G Tube, Q4H PRN    acetaminophen, 650 mg, Rectal, Q4H PRN    artificial tears, 1 drop, Both Eyes, PRN    bisacodyL, 10 mg, Rectal, Daily PRN    ceFAZolin (Ancef) IV (PEDS and ADULTS), 2 g, Intravenous, On Call Procedure    D10W, , Intravenous, Continuous PRN    dextrose 50%, 12.5 g, Intravenous, PRN    dextrose 50%, 25 g, Intravenous, PRN    enalaprilat, 1.25 mg, Intravenous, Q4H PRN    fentaNYL, 50 mcg, Intravenous, Q2H PRN    glucagon (human recombinant), 1 mg, Intramuscular, PRN    glucagon (human recombinant), 1 mg, Intramuscular, PRN    hydrALAZINE, 10 mg, Intravenous, Q4H PRN    insulin aspart U-100, 0-15 Units, Subcutaneous, Q6H PRN    labetaloL, 20 mg, Intravenous, Q4H PRN    LORazepam, 2 mg, Per G Tube, Q4H PRN    ondansetron, 4 mg,  Intravenous, Q6H PRN    oxyCODONE, 5 mg, Per G Tube, Q4H PRN    prochlorperazine, 5 mg, Intravenous, Q6H PRN    Pharmacy to dose Vancomycin consult, , , Once **AND** vancomycin - pharmacy to dose, , Intravenous, pharmacy to manage frequency     Objective  Temp:  [97.7 °F (36.5 °C)-98.7 °F (37.1 °C)] 97.8 °F (36.6 °C)  Pulse:  [82-92] 89  Resp:  [18-26] 18  SpO2:  [90 %-99 %] 90 %  BP: (101-142)/(60-72) 123/69     Gen: NAD, awake, tracking  HEENT: EOMI, NCAT  CV: RR  Resp: no shortness of breath, normal WOB   Abd: soft, non-distended, ATTP, PEG in place  Ext:  Full ROM. No deformity.      Labs  Recent Labs     02/18/25  0429 02/19/25  0422 02/20/25  0432   WBC 8.80 7.34 8.37   HGB 9.3* 9.5* 10.3*   HCT 30.3* 29.7* 32.3*    397 423*     Recent Labs     02/18/25  0429 02/19/25  0422 02/20/25  0432    139 136   K 4.1 3.8 4.3   * 106 101   CO2 25 26 26   BUN 30.2* 23.4 27.0*   CREATININE 0.67* 0.69* 0.65*   CALCIUM 8.0* 8.5* 8.4*   ALBUMIN 2.0* 2.1* 2.4*   BILITOT 0.4 0.4 0.4   AST 19 20 24   ALKPHOS 67 73 86   ALT 19 20 27     Recent Labs     02/18/25  0404 02/18/25  1102 02/18/25  1602 02/18/25  2226 02/19/25  0556 02/19/25  1132 02/19/25  2132 02/20/25  0422   POCTGLUCOSE 236* 218* 155* 218* 260* 275* 205* 243*        Imaging  No results found in the last 24 hours.       Assessment/Plan  Trach  Trach collar PRN maintain sats > 92%  Routine trach care     PEG  TF per nutrition recommendations  Can be removed 6 weeks after placement if tolerating diet    SDH/IPH, SAH s/p crani  On Lovenox  Neuro rehab  All staples out  BP < 160    Rib fx  MMPC    Fevers/Leukocytosis  Resp cultures positive, continue abx for now pending sensitivities    Vfib arrest  K > 4, Mag > 2 per cardiology    Fall  Neuro rehab  Mon/Thurs labs  PT/OT  Neuro rehab  Lasix IV x 1    Brenton Jay  Trauma Surgery   c - [phone]

## 2025-02-20 NOTE — PROGRESS NOTES
Pharmacokinetic Assessment Follow Up: IV Vancomycin    Vancomycin serum concentration assessment(s):    Trough was drawn while dose was infusing so it cannot be used for dosing.    Vancomycin Regimen Plan:    Check trough before next dose.    Drug levels (last 3 results):  Recent Labs   Lab Result Units 02/18/25  0838 02/19/25  1739   Vancomycin Trough ug/ml 9.0* 25.7*          Patient brief summary:  Ezequiel Ramires is a 65 y.o. male initiated on antimicrobial therapy with IV Vancomycin for treatment of  pneumonia    Actual Body Weight:   80 kg    Renal Function:   Estimated Creatinine Clearance: 117.1 mL/min (A) (based on SCr of 0.69 mg/dL (L)).,     Dialysis Method (if applicable):  N/A    CBC (last 72 hours):  Recent Labs   Lab Result Units 02/17/25  0329 02/18/25  0429 02/19/25  0422   WBC x10(3)/mcL 12.17* 8.80 7.34   Hgb g/dL 10.1* 9.3* 9.5*   Hct % 31.9* 30.3* 29.7*   Platelet x10(3)/mcL 411* 365 397   Mono % % 8.4 7.8 7.8   Eos % % 1.3 3.5 4.1   Basophil % % 0.7 0.7 0.8       Metabolic Panel (last 72 hours):  Recent Labs   Lab Result Units 02/17/25 0329 02/17/25  0633 02/18/25  0429 02/18/25  0708 02/19/25  0422   Sodium mmol/L 142  --  143  --  139   Sodium, Blood Gas mmol/L  --  140  --  140  --    Potassium mmol/L 4.1  --  4.1  --  3.8   Potassium, Blood Gas mmol/L  --  4.1  --  4.1  --    Chloride mmol/L 109*  --  109*  --  106   CO2 mmol/L 27  --  25  --  26   Glucose mg/dL 262*  --  259*  --  278*   Blood Urea Nitrogen mg/dL 29.9*  --  30.2*  --  23.4   Creatinine mg/dL 0.71*  --  0.67*  --  0.69*   Albumin g/dL 2.1*  --  2.0*  --  2.1*   Bilirubin Total mg/dL <0.5  --  0.4  --  0.4   ALP unit/L 75  --  67  --  73   AST unit/L 18  --  19  --  20   ALT unit/L 18  --  19  --  20       Vancomycin Administrations:  vancomycin given in the last 96 hours                     vancomycin 1,250 mg in D5W 250 mL IVPB (admixture device) (mg) 1,250 mg New Bag 02/19/25 1700     1,250 mg New Bag  0938     1,250 mg New  Bag  0047    vancomycin 1,250 mg in 0.9% NaCl 250 mL IVPB (admixture device) (mg) 1,250 mg New Bag 02/18/25 1624    vancomycin 1,500 mg in sodium chloride 0.9% 250 mL IVPB (admixture device) (mg) 1,500 mg New Bag 02/18/25 0905     1,500 mg New Bag 02/17/25 2130      Restarted  0927     1,500 mg New Bag  0924     1,500 mg New Bag 02/16/25 2202    vancomycin (VANCOCIN) 1,000 mg in 0.9% NaCl 250 mL IVPB (admixture device) (mg) 1,000 mg New Bag 02/16/25 1130    vancomycin (VANCOCIN) 1,000 mg in 0.9% NaCl 250 mL IVPB (admixture device) (mg) 1,000 mg New Bag 02/16/25 1006                    Microbiologic Results:  Microbiology Results (last 7 days)       Procedure Component Value Units Date/Time    Blood Culture [[id number]]  (Normal) Collected: 02/16/25 0846    Order Status: Completed Specimen: Blood from Hand, Right Updated: 02/19/25 1000     Blood Culture No Growth At 72 Hours    Blood Culture [[id number]]  (Normal) Collected: 02/16/25 0852    Order Status: Completed Specimen: Blood from Wrist, Left Updated: 02/19/25 1000     Blood Culture No Growth At 72 Hours    Respiratory Culture [[id number]]  (Abnormal) Collected: 02/16/25 0900    Order Status: Resulted Specimen: Respiratory from Trachael Aspirate Updated: 02/18/25 1332     Respiratory Culture Streptococcus constellatus     Comment: with normal respiratory autumn        GRAM STAIN Quality 1+      Many Gram positive cocci      Many Gram Positive Rods      Few Gram Negative Rods    Blood Culture [[id number]]  (Normal) Collected: 02/10/25 1137    Order Status: Completed Specimen: Blood Updated: 02/15/25 1401     Blood Culture No Growth at 5 days    Blood Culture [[id number]]  (Normal) Collected: 02/10/25 1043    Order Status: Completed Specimen: Blood Updated: 02/15/25 1201     Blood Culture No Growth at 5 days

## 2025-02-20 NOTE — PT/OT/SLP PROGRESS
Occupational Therapy      Patient Name:  Ezequiel Ramires   MRN:  [id number]    OT/LEYVA conference held: OT to increase POC to 6x/wk as patient has made great progress towards OT goals with significant improvement in OT therapy participation.    2/20/2025

## 2025-02-20 NOTE — PROGRESS NOTES
Pharmacokinetic Assessment Follow Up: IV Vancomycin    Vancomycin serum concentration assessment(s):    The trough level was drawn correctly and can be used to guide therapy at this time. The measurement is within the desired definitive target range of 15 to 20 mcg/mL.    Vancomycin Regimen Plan:    Continue regimen to Vancomycin 1250 mg IV every 8 hours with next serum trough concentration measured at 0800 prior to 5th dose on 02/21    Drug levels (last 3 results):  Recent Labs   Lab Result Units 02/18/25  0838 02/19/25  1739 02/20/25  0000   Vancomycin Trough ug/ml 9.0* 25.7* 17.8       Vancomycin Administrations:  vancomycin given in the last 96 hours                     vancomycin 1,250 mg in D5W 250 mL IVPB (admixture device) (mg) 1,250 mg New Bag 02/20/25 0112     1,250 mg New Bag 02/19/25 1700     1,250 mg New Bag  0938     1,250 mg New Bag  0047    vancomycin 1,250 mg in 0.9% NaCl 250 mL IVPB (admixture device) (mg) 1,250 mg New Bag 02/18/25 1624    vancomycin 1,500 mg in sodium chloride 0.9% 250 mL IVPB (admixture device) (mg) 1,500 mg New Bag 02/18/25 0905     1,500 mg New Bag 02/17/25 2130      Restarted  0927     1,500 mg New Bag  0924     1,500 mg New Bag 02/16/25 2202    vancomycin (VANCOCIN) 1,000 mg in 0.9% NaCl 250 mL IVPB (admixture device) (mg) 1,000 mg New Bag 02/16/25 1130    vancomycin (VANCOCIN) 1,000 mg in 0.9% NaCl 250 mL IVPB (admixture device) (mg) 1,000 mg New Bag 02/16/25 1006                    Pharmacy will continue to follow and monitor vancomycin.    Please contact pharmacy at extension [phone] for questions regarding this assessment.    Thank you for the consult,   Ney Garcia       Patient brief summary:  Ezequiel Ramires is a 65 y.o. male initiated on antimicrobial therapy with IV Vancomycin for treatment of lower respiratory infection    The patient's current regimen is vancomycin 1250 mg IV every 8 hours    Drug Allergies:   Review of patient's allergies indicates:  No Known  Allergies    Actual Body Weight:  Wt Readings from Last 1 Encounters:   02/05/25 80 kg (176 lb 5.9 oz)       Renal Function:   Estimated Creatinine Clearance: 117.1 mL/min (A) (based on SCr of 0.69 mg/dL (L)).,     Dialysis Method (if applicable):  N/A    CBC (last 72 hours):  Recent Labs   Lab Result Units 02/17/25 0329 02/18/25  0429 02/19/25  0422   WBC x10(3)/mcL 12.17* 8.80 7.34   Hgb g/dL 10.1* 9.3* 9.5*   Hct % 31.9* 30.3* 29.7*   Platelet x10(3)/mcL 411* 365 397   Mono % % 8.4 7.8 7.8   Eos % % 1.3 3.5 4.1   Basophil % % 0.7 0.7 0.8       Metabolic Panel (last 72 hours):  Recent Labs   Lab Result Units 02/17/25 0329 02/17/25  0633 02/18/25 0429 02/18/25  0708 02/19/25  0422   Sodium mmol/L 142  --  143  --  139   Sodium, Blood Gas mmol/L  --  140  --  140  --    Potassium mmol/L 4.1  --  4.1  --  3.8   Potassium, Blood Gas mmol/L  --  4.1  --  4.1  --    Chloride mmol/L 109*  --  109*  --  106   CO2 mmol/L 27  --  25  --  26   Glucose mg/dL 262*  --  259*  --  278*   Blood Urea Nitrogen mg/dL 29.9*  --  30.2*  --  23.4   Creatinine mg/dL 0.71*  --  0.67*  --  0.69*   Albumin g/dL 2.1*  --  2.0*  --  2.1*   Bilirubin Total mg/dL <0.5  --  0.4  --  0.4   ALP unit/L 75  --  67  --  73   AST unit/L 18  --  19  --  20   ALT unit/L 18  --  19  --  20       Microbiologic Results:  Microbiology Results (last 7 days)       Procedure Component Value Units Date/Time    Blood Culture [[id number]]  (Normal) Collected: 02/16/25 0846    Order Status: Completed Specimen: Blood from Hand, Right Updated: 02/19/25 1000     Blood Culture No Growth At 72 Hours    Blood Culture [[id number]]  (Normal) Collected: 02/16/25 0852    Order Status: Completed Specimen: Blood from Wrist, Left Updated: 02/19/25 1000     Blood Culture No Growth At 72 Hours    Respiratory Culture [[id number]]  (Abnormal) Collected: 02/16/25 0900    Order Status: Resulted Specimen: Respiratory from Trachael Aspirate Updated: 02/18/25 1332     Respiratory  Culture Streptococcus constellatus     Comment: with normal respiratory autumn        GRAM STAIN Quality 1+      Many Gram positive cocci      Many Gram Positive Rods      Few Gram Negative Rods    Blood Culture [[id number]]  (Normal) Collected: 02/10/25 1137    Order Status: Completed Specimen: Blood Updated: 02/15/25 1401     Blood Culture No Growth at 5 days    Blood Culture [[id number]]  (Normal) Collected: 02/10/25 1043    Order Status: Completed Specimen: Blood Updated: 02/15/25 1201     Blood Culture No Growth at 5 days

## 2025-02-21 VITALS
RESPIRATION RATE: 18 BRPM | HEIGHT: 72 IN | WEIGHT: 176.38 LBS | SYSTOLIC BLOOD PRESSURE: 117 MMHG | HEART RATE: 84 BPM | BODY MASS INDEX: 23.89 KG/M2 | OXYGEN SATURATION: 96 % | DIASTOLIC BLOOD PRESSURE: 72 MMHG | TEMPERATURE: 99 F

## 2025-02-21 LAB
ALBUMIN SERPL-MCNC: 2.5 G/DL (ref 3.4–4.8)
ALBUMIN/GLOB SERPL: 0.7 RATIO (ref 1.1–2)
ALP SERPL-CCNC: 93 UNIT/L (ref 40–150)
ALT SERPL-CCNC: 34 UNIT/L (ref 0–55)
ANION GAP SERPL CALC-SCNC: 11 MEQ/L
AST SERPL-CCNC: 32 UNIT/L (ref 5–34)
BACTERIA BLD CULT: NORMAL
BACTERIA BLD CULT: NORMAL
BASOPHILS # BLD AUTO: 0.07 X10(3)/MCL
BASOPHILS NFR BLD AUTO: 0.8 %
BILIRUB SERPL-MCNC: 0.4 MG/DL
BUN SERPL-MCNC: 27.4 MG/DL (ref 8.4–25.7)
CALCIUM SERPL-MCNC: 8.8 MG/DL (ref 8.8–10)
CHLORIDE SERPL-SCNC: 105 MMOL/L (ref 98–107)
CO2 SERPL-SCNC: 23 MMOL/L (ref 23–31)
CREAT SERPL-MCNC: 0.67 MG/DL (ref 0.72–1.25)
CREAT/UREA NIT SERPL: 41
EOSINOPHIL # BLD AUTO: 0.33 X10(3)/MCL (ref 0–0.9)
EOSINOPHIL NFR BLD AUTO: 3.8 %
ERYTHROCYTE [DISTWIDTH] IN BLOOD BY AUTOMATED COUNT: 14.5 % (ref 11.5–17)
GFR SERPLBLD CREATININE-BSD FMLA CKD-EPI: >60 ML/MIN/1.73/M2
GLOBULIN SER-MCNC: 3.6 GM/DL (ref 2.4–3.5)
GLUCOSE SERPL-MCNC: 219 MG/DL (ref 82–115)
HCT VFR BLD AUTO: 32.9 % (ref 42–52)
HGB BLD-MCNC: 10.3 G/DL (ref 14–18)
IMM GRANULOCYTES # BLD AUTO: 0.14 X10(3)/MCL (ref 0–0.04)
IMM GRANULOCYTES NFR BLD AUTO: 1.6 %
LYMPHOCYTES # BLD AUTO: 2.15 X10(3)/MCL (ref 0.6–4.6)
LYMPHOCYTES NFR BLD AUTO: 24.4 %
MCH RBC QN AUTO: 27.9 PG (ref 27–31)
MCHC RBC AUTO-ENTMCNC: 31.3 G/DL (ref 33–36)
MCV RBC AUTO: 89.2 FL (ref 80–94)
MONOCYTES # BLD AUTO: 0.48 X10(3)/MCL (ref 0.1–1.3)
MONOCYTES NFR BLD AUTO: 5.5 %
NEUTROPHILS # BLD AUTO: 5.63 X10(3)/MCL (ref 2.1–9.2)
NEUTROPHILS NFR BLD AUTO: 63.9 %
NRBC BLD AUTO-RTO: 0.2 %
PLATELET # BLD AUTO: 418 X10(3)/MCL (ref 130–400)
PMV BLD AUTO: 10.8 FL (ref 7.4–10.4)
POCT GLUCOSE: 209 MG/DL (ref 70–110)
POTASSIUM SERPL-SCNC: 4.2 MMOL/L (ref 3.5–5.1)
PROT SERPL-MCNC: 6.1 GM/DL (ref 5.8–7.6)
RBC # BLD AUTO: 3.69 X10(6)/MCL (ref 4.7–6.1)
SODIUM SERPL-SCNC: 139 MMOL/L (ref 136–145)
WBC # BLD AUTO: 8.8 X10(3)/MCL (ref 4.5–11.5)

## 2025-02-21 PROCEDURE — 63600175 PHARM REV CODE 636 W HCPCS

## 2025-02-21 PROCEDURE — 94761 N-INVAS EAR/PLS OXIMETRY MLT: CPT

## 2025-02-21 PROCEDURE — 63600175 PHARM REV CODE 636 W HCPCS: Performed by: NURSE PRACTITIONER

## 2025-02-21 PROCEDURE — 99900026 HC AIRWAY MAINTENANCE (STAT)

## 2025-02-21 PROCEDURE — 25000242 PHARM REV CODE 250 ALT 637 W/ HCPCS: Performed by: SURGERY

## 2025-02-21 PROCEDURE — 99900031 HC PATIENT EDUCATION (STAT)

## 2025-02-21 PROCEDURE — 94640 AIRWAY INHALATION TREATMENT: CPT

## 2025-02-21 PROCEDURE — 94760 N-INVAS EAR/PLS OXIMETRY 1: CPT

## 2025-02-21 PROCEDURE — 63600175 PHARM REV CODE 636 W HCPCS: Performed by: SURGERY

## 2025-02-21 PROCEDURE — 27000221 HC OXYGEN, UP TO 24 HOURS

## 2025-02-21 PROCEDURE — 80053 COMPREHEN METABOLIC PANEL: CPT

## 2025-02-21 PROCEDURE — 25000003 PHARM REV CODE 250: Performed by: SURGERY

## 2025-02-21 PROCEDURE — 99900035 HC TECH TIME PER 15 MIN (STAT)

## 2025-02-21 PROCEDURE — 85025 COMPLETE CBC W/AUTO DIFF WBC: CPT

## 2025-02-21 PROCEDURE — 25000003 PHARM REV CODE 250

## 2025-02-21 PROCEDURE — 25000003 PHARM REV CODE 250: Performed by: NURSE PRACTITIONER

## 2025-02-21 PROCEDURE — 99900022

## 2025-02-21 PROCEDURE — 36415 COLL VENOUS BLD VENIPUNCTURE: CPT

## 2025-02-21 PROCEDURE — 27200966 HC CLOSED SUCTION SYSTEM

## 2025-02-21 RX ORDER — SODIUM CHLORIDE FOR INHALATION 3 %
2 VIAL, NEBULIZER (ML) INHALATION EVERY 4 HOURS
Start: 2025-02-21

## 2025-02-21 RX ORDER — OXYCODONE HYDROCHLORIDE 5 MG/1
5 TABLET ORAL EVERY 4 HOURS PRN
Start: 2025-02-21

## 2025-02-21 RX ORDER — FAMOTIDINE 20 MG/1
20 TABLET, FILM COATED ORAL 2 TIMES DAILY
Start: 2025-02-21 | End: 2026-02-21

## 2025-02-21 RX ORDER — INSULIN GLARGINE 100 [IU]/ML
45 INJECTION, SOLUTION SUBCUTANEOUS 2 TIMES DAILY
Start: 2025-02-21 | End: 2026-02-21

## 2025-02-21 RX ORDER — METHYLPHENIDATE HYDROCHLORIDE 20 MG/1
20 TABLET ORAL 2 TIMES DAILY WITH MEALS
Start: 2025-02-21

## 2025-02-21 RX ORDER — LEVOFLOXACIN 750 MG/1
750 TABLET ORAL DAILY
Start: 2025-02-21 | End: 2025-03-03

## 2025-02-21 RX ORDER — INSULIN ASPART 100 [IU]/ML
0-15 INJECTION, SOLUTION INTRAVENOUS; SUBCUTANEOUS EVERY 6 HOURS PRN
Start: 2025-02-21 | End: 2026-02-21

## 2025-02-21 RX ORDER — ENOXAPARIN SODIUM 100 MG/ML
40 INJECTION SUBCUTANEOUS EVERY 12 HOURS
Start: 2025-02-21

## 2025-02-21 RX ADMIN — LEVOFLOXACIN 750 MG: 500 TABLET, FILM COATED ORAL at 08:02

## 2025-02-21 RX ADMIN — AMLODIPINE BESYLATE 10 MG: 5 TABLET ORAL at 08:02

## 2025-02-21 RX ADMIN — SODIUM CHLORIDE SOLN NEBU 3% 2 ML: 3 NEBU SOLN at 12:02

## 2025-02-21 RX ADMIN — IPRATROPIUM BROMIDE AND ALBUTEROL SULFATE 3 ML: 2.5; .5 SOLUTION RESPIRATORY (INHALATION) at 04:02

## 2025-02-21 RX ADMIN — SODIUM CHLORIDE SOLN NEBU 3% 2 ML: 3 NEBU SOLN at 04:02

## 2025-02-21 RX ADMIN — IPRATROPIUM BROMIDE AND ALBUTEROL SULFATE 3 ML: 2.5; .5 SOLUTION RESPIRATORY (INHALATION) at 12:02

## 2025-02-21 RX ADMIN — INSULIN GLARGINE 45 UNITS: 100 INJECTION, SOLUTION SUBCUTANEOUS at 08:02

## 2025-02-21 RX ADMIN — ENOXAPARIN SODIUM 40 MG: 40 INJECTION SUBCUTANEOUS at 08:02

## 2025-02-21 RX ADMIN — METHYLPHENIDATE HYDROCHLORIDE 20 MG: 5 TABLET ORAL at 09:02

## 2025-02-21 RX ADMIN — SODIUM CHLORIDE SOLN NEBU 3% 2 ML: 3 NEBU SOLN at 07:02

## 2025-02-21 RX ADMIN — OXYCODONE HYDROCHLORIDE 5 MG: 5 TABLET ORAL at 09:02

## 2025-02-21 RX ADMIN — THIAMINE HCL TAB 100 MG 100 MG: 100 TAB at 08:02

## 2025-02-21 RX ADMIN — INSULIN ASPART 6 UNITS: 100 INJECTION, SOLUTION INTRAVENOUS; SUBCUTANEOUS at 04:02

## 2025-02-21 RX ADMIN — THERA TABS 1 TABLET: TAB at 08:02

## 2025-02-21 RX ADMIN — METOPROLOL TARTRATE 50 MG: 50 TABLET, FILM COATED ORAL at 08:02

## 2025-02-21 RX ADMIN — FOLIC ACID 1 MG: 1 TABLET ORAL at 08:02

## 2025-02-21 RX ADMIN — FAMOTIDINE 20 MG: 20 TABLET, FILM COATED ORAL at 08:02

## 2025-02-21 RX ADMIN — IPRATROPIUM BROMIDE AND ALBUTEROL SULFATE 3 ML: 2.5; .5 SOLUTION RESPIRATORY (INHALATION) at 07:02

## 2025-02-21 RX ADMIN — METHOCARBAMOL 500 MG: 500 TABLET ORAL at 04:02

## 2025-02-21 NOTE — DISCHARGE SUMMARY
"[facility]  Trauma  Discharge Summary      Patient Name: Ezequiel Raimres  MRN: [id number]  Admission Date: 2/4/2025  Hospital Length of Stay: 17 days  Discharge Date and Time:  02/21/2025 6:55 AM  Attending Physician: Chinmay Cope MD   Discharging Provider: ERI Kyle  Primary Care Provider: Davy Stacy MD    HPI:   No notes on file    Procedure(s) (LRB):  CRANIOTOMY, FOR SUBDURAL HEMATOMA EVACUATION (Right)      Indwelling Lines/Drains at time of discharge:   Lines/Drains/Airways       Drain  Duration             Male External Urinary Catheter 02/06/25 2300 Small 14 days         Gastrostomy/Enterostomy 02/11/25 1017 LUQ 9 days              Airway  Duration             Adult Surgical Airway 02/11/25 0958 Shiley Cuffed 8.0 / 85 mm 9 days                  Hospital Course: 65M admitted after a fall from bed. Found to have significant head injuries requiring crani. Also has occipital fracture and right rib fracture not requiring intervention. Due to his TBI required a trach/PEG. He also now has a strep pneumonia sensitive to Levaquin. Now afebrile for several days and WBC WNL. Clear for rehab and will be Dced today. Needs cardiology, PCP, neurosurgery today. Can see us in the future if trach/PEG needs to be removed. Would need to be tolerating diet for several weeks, gaining weight, cleared by speech, and no planned surgeries. Similar requirements for trach removal.     Goals of Care Treatment Preferences:  Code Status: Full Code      Consults:   Consults (From admission, onward)          Status Ordering Provider     Inpatient consult to Respiratory Care  Once        Provider:  (Not yet assigned)    Acknowledged CHRISTINE PAVON JR     Pharmacy to dose Vancomycin consult  Once        Provider:  (Not yet assigned)   Placed in "And" Linked Group    Completed CHRIS ARIAS     Inpatient consult to Cardiology  Once        Provider:  Ismael Azul MD    Completed " CHRIS ARIAS     Inpatient consult to Orthotics  Once        Provider:  Duane [facility] -    Acknowledged CHRISTINE PAVON JR     Inpatient consult to Registered Dietitian/Nutritionist  Once        Provider:  (Not yet assigned)    Completed CHRISTINE PAVON JR     Inpatient consult to Neurology Services (Vascular Neurology)  Once        Provider:  Clovis Spears MD    Completed MATTHEW MCFADDEN     Inpatient consult to Neurosurgery  Once        Provider:  Samir Garcia MD    Completed MATTHEW MCFADDEN            Significant Diagnostic Studies: N/A    Pending Diagnostic Studies:       None          Final Active Diagnoses:    Diagnosis Date Noted POA    PRINCIPAL PROBLEM:  SDH (subdural hematoma) [S06.5XAA] 02/04/2025 Yes    Acute hypoxemic respiratory failure [J96.01] 02/12/2025 Yes    Dysphagia [R13.10] 02/12/2025 No    VAP (ventilator-associated pneumonia) [J95.851] 02/12/2025 No    Intraparenchymal hemorrhage of brain [I61.9] 02/05/2025 Yes    IVH (intraventricular hemorrhage) [I61.5] 02/05/2025 Yes    SAH (subarachnoid hemorrhage) [I60.9] 02/04/2025 Yes    Closed fracture of one rib of right side [S22.31XA] 02/04/2025 Yes    Closed fracture of left side of occipital bone [S02.119A] 02/04/2025 Yes      Problems Resolved During this Admission:      Discharged Condition: good    Disposition: Rehab    Follow Up:   Follow-up Information       Samir Garcia MD Follow up on 5/15/2025.    Specialty: Neurosurgery  Why: at 9 am  Contact information:  [address]  [place] [address] [phone]               Cardiologist Follow up.    Why: Please see your cardiologist after rehab and have him/her review records and recommendations from our cardiologist here.                         Patient Instructions:   No discharge procedures on file.  Medications:  Reconciled Home Medications:      Medication List        START taking these medications      enoxaparin 40 mg/0.4  mL Syrg  Commonly known as: LOVENOX  Inject 0.4 mLs (40 mg total) into the skin 2 (two) times daily.     famotidine 20 MG tablet  Commonly known as: PEPCID  1 tablet (20 mg total) by Per G Tube route 2 (two) times daily.     insulin aspart U-100 100 unit/mL injection  Commonly known as: NovoLOG  Inject 0-15 Units into the skin every 6 (six) hours as needed.     insulin glargine U-100 (Lantus) 100 unit/mL injection  Inject 45 Units into the skin 2 (two) times daily.     levoFLOXacin 750 MG tablet  Commonly known as: LEVAQUIN  1 tablet (750 mg total) by Per G Tube route once daily. for 10 days     methylphenidate HCl 20 MG tablet  Commonly known as: RITALIN  1 tablet (20 mg total) by Per G Tube route 2 (two) times daily with meals.     oxyCODONE 5 MG immediate release tablet  Commonly known as: ROXICODONE  1 tablet (5 mg total) by Per G Tube route every 4 (four) hours as needed for Pain.     sodium chloride 3% 3 % nebulizer solution  Take 2 mLs by nebulization every 4 (four) hours.            CONTINUE taking these medications      ezetimibe 10 mg tablet  Commonly known as: ZETIA  Take 1 tablet by mouth once daily.     icosapent ethyL 1 gram Cap  Commonly known as: VASCEPA  Take 2,000 mg by mouth every evening.     OZEMPIC SUBQ  Inject into the skin.     rosuvastatin 10 MG tablet  Commonly known as: CRESTOR  Take 1 tablet by mouth every evening.            STOP taking these medications      amLODIPine 5 MG tablet  Commonly known as: NORVASC     aspirin 81 MG EC tablet  Commonly known as: ECOTRIN     losartan 100 MG tablet  Commonly known as: COZAAR     metoprolol tartrate 50 MG tablet  Commonly known as: LOPRESSOR     prasugreL HCl 10 mg Tab  Commonly known as: EFFIENT            Time spent on the discharge of patient: 35 minutes    ERI Kyle  Trauma [facility]

## 2025-02-21 NOTE — PLAN OF CARE
Problem: Adult Inpatient Plan of Care  Goal: Plan of Care Review  Outcome: Progressing  Flowsheets (Taken 2/21/2025 0404)  Plan of Care Reviewed With:   patient   spouse     Problem: Adult Inpatient Plan of Care  Goal: Patient-Specific Goal (Individualized)  Outcome: Progressing  Flowsheets (Taken 2/21/2025 0404)  Individualized Care Needs: neuro  Anxieties, Fears or Concerns: evangelist  Patient/Family-Specific Goals (Include Timeframe): improvement     Problem: Adult Inpatient Plan of Care  Goal: Absence of Hospital-Acquired Illness or Injury  Outcome: Progressing  Intervention: Identify and Manage Fall Risk  Flowsheets (Taken 2/21/2025 0404)  Safety Promotion/Fall Prevention:   assistive device/personal item within reach   nonskid shoes/socks when out of bed   side rails raised x 3  Intervention: Prevent Skin Injury  Flowsheets (Taken 2/21/2025 0404)  Body Position: weight shifting  Skin Protection: incontinence pads utilized  Device Skin Pressure Protection: absorbent pad utilized/changed  Intervention: Prevent and Manage VTE (Venous Thromboembolism) Risk  Flowsheets (Taken 2/21/2025 0404)  VTE Prevention/Management:   remove, assess skin, and reapply sequential compression device   bleeding risk assessed   dorsiflexion/plantar flexion performed   fluids promoted   ROM (passive) performed  Intervention: Prevent Infection  Flowsheets (Taken 2/21/2025 0404)  Infection Prevention:   environmental surveillance performed   hand hygiene promoted   rest/sleep promoted     Problem: Adult Inpatient Plan of Care  Goal: Absence of Hospital-Acquired Illness or Injury  Intervention: Prevent Skin Injury  Flowsheets (Taken 2/21/2025 0404)  Body Position: weight shifting  Skin Protection: incontinence pads utilized  Device Skin Pressure Protection: absorbent pad utilized/changed     Problem: Adult Inpatient Plan of Care  Goal: Absence of Hospital-Acquired Illness or Injury  Intervention: Prevent and Manage VTE (Venous  Thromboembolism) Risk  Flowsheets (Taken 2/21/2025 0404)  VTE Prevention/Management:   remove, assess skin, and reapply sequential compression device   bleeding risk assessed   dorsiflexion/plantar flexion performed   fluids promoted   ROM (passive) performed     Problem: Adult Inpatient Plan of Care  Goal: Optimal Comfort and Wellbeing  Outcome: Progressing  Intervention: Monitor Pain and Promote Comfort  Flowsheets (Taken 2/21/2025 0404)  Pain Management Interventions: care clustered  Intervention: Provide Person-Centered Care  Flowsheets (Taken 2/21/2025 0404)  Trust Relationship/Rapport:   care explained   choices provided   emotional support provided     Problem: Infection  Goal: Absence of Infection Signs and Symptoms  Outcome: Progressing     Problem: Wound  Goal: Optimal Coping  Outcome: Progressing  Intervention: Support Patient and Family Response  Flowsheets (Taken 2/21/2025 0404)  Supportive Measures: positive reinforcement provided  Family/Support System Care: involvement promoted     Problem: Wound  Goal: Optimal Functional Ability  Outcome: Progressing  Intervention: Optimize Functional Ability  Flowsheets (Taken 2/21/2025 0404)  Activity Management: Rolling - L1  Activity Assistance Provided: assistance, 2 people

## 2025-02-21 NOTE — HOSPITAL COURSE
65M admitted after a fall from bed. Found to have significant head injuries requiring crani. Also has occipital fracture and right rib fracture not requiring intervention. Due to his TBI required a trach/PEG. He also now has a strep pneumonia sensitive to Levaquin. Now afebrile for several days and WBC WNL. Clear for rehab and will be Dced today. Needs cardiology, PCP, neurosurgery today. Can see us in the future if trach/PEG needs to be removed. Would need to be tolerating diet for several weeks, gaining weight, cleared by speech, and no planned surgeries. Similar requirements for trach removal.

## 2025-02-21 NOTE — PLAN OF CARE
02/21/25 0756   Final Note   Assessment Type Final Discharge Note   Anticipated Discharge Disposition Rehab   Hospital Resources/Appts/Education Provided Post-Acute resouces added to AVS   Post-Acute Status   Post-Acute Authorization Placement   Post-Acute Placement Status Set-up Complete/Auth obtained   Discharge Delays None known at this time     [facility] Transportation arranged with [employer] for 1000 pickup. Packet given to nurse. Pt's spouse aware of d/c today and pickup time. No further CM needs known at this time.     Deepa Baird LCSW

## 2025-04-08 DIAGNOSIS — S06.5X0A: Primary | ICD-10-CM

## 2025-04-10 ENCOUNTER — ANESTHESIA EVENT (OUTPATIENT)
Dept: SURGERY | Facility: HOSPITAL | Age: 65
End: 2025-04-10
Payer: MEDICARE

## 2025-04-10 ENCOUNTER — HOSPITAL ENCOUNTER (OUTPATIENT)
Dept: RADIOLOGY | Facility: HOSPITAL | Age: 65
Discharge: HOME | End: 2025-04-10
Attending: NEUROLOGICAL SURGERY
Payer: COMMERCIAL

## 2025-04-10 DIAGNOSIS — S06.5X0A: ICD-10-CM

## 2025-04-10 PROCEDURE — 70470 CT HEAD/BRAIN W/O & W/DYE: CPT | Mod: TC

## 2025-04-10 PROCEDURE — 25500020 PHARM REV CODE 255: Performed by: NEUROLOGICAL SURGERY

## 2025-04-10 RX ORDER — DIATRIZOATE MEGLUMINE AND DIATRIZOATE SODIUM 660; 100 MG/ML; MG/ML
30 SOLUTION ORAL; RECTAL
Status: COMPLETED | OUTPATIENT
Start: 2025-04-10 | End: 2025-04-10

## 2025-04-10 RX ADMIN — IOHEXOL 75 ML: 350 INJECTION, SOLUTION INTRAVENOUS at 04:04

## 2025-04-10 RX ADMIN — DIATRIZOATE MEGLUMINE AND DIATRIZOATE SODIUM 30 ML: 660; 100 LIQUID ORAL; RECTAL at 03:04

## 2025-04-23 RX ORDER — SENNOSIDES 8.6 MG/1
1 TABLET ORAL
COMMUNITY

## 2025-04-23 RX ORDER — CHOLECALCIFEROL (VITAMIN D3) 25 MCG
1000 TABLET ORAL DAILY
COMMUNITY

## 2025-04-23 RX ORDER — IBUPROFEN/PSEUDOEPHEDRINE HCL 200MG-30MG
3 TABLET ORAL NIGHTLY
COMMUNITY

## 2025-04-23 RX ORDER — LOSARTAN POTASSIUM 100 MG/1
100 TABLET ORAL DAILY
COMMUNITY

## 2025-04-23 RX ORDER — MULTIVITAMIN
1 TABLET ORAL DAILY
COMMUNITY

## 2025-04-23 RX ORDER — TRAZODONE HYDROCHLORIDE 150 MG/1
150 TABLET ORAL NIGHTLY
COMMUNITY

## 2025-04-23 RX ORDER — NAPROXEN SODIUM 220 MG/1
81 TABLET, FILM COATED ORAL DAILY
COMMUNITY

## 2025-04-23 RX ORDER — POLYETHYLENE GLYCOL 3350 17 G/17G
17 POWDER, FOR SOLUTION ORAL 2 TIMES DAILY PRN
COMMUNITY

## 2025-04-23 RX ORDER — METOPROLOL TARTRATE 50 MG/1
50 TABLET ORAL 2 TIMES DAILY
COMMUNITY

## 2025-04-30 ENCOUNTER — HOSPITAL ENCOUNTER (INPATIENT)
Facility: HOSPITAL | Age: 65
LOS: 2 days | Discharge: HOME | DRG: 941 | End: 2025-05-02
Attending: NEUROLOGICAL SURGERY | Admitting: NEUROLOGICAL SURGERY
Payer: MEDICARE

## 2025-04-30 ENCOUNTER — ANESTHESIA (OUTPATIENT)
Dept: SURGERY | Facility: HOSPITAL | Age: 65
End: 2025-04-30
Payer: MEDICARE

## 2025-04-30 DIAGNOSIS — S06.5X0A SUBDURAL HEMORRHAGE FOLLOWING INJURY, NO LOSS OF CONSCIOUSNESS: Primary | ICD-10-CM

## 2025-04-30 LAB
ANION GAP SERPL CALC-SCNC: 9 MEQ/L
BASOPHILS # BLD AUTO: 0.07 X10(3)/MCL
BASOPHILS NFR BLD AUTO: 0.7 %
BUN SERPL-MCNC: 16.6 MG/DL (ref 8.4–25.7)
CALCIUM SERPL-MCNC: 9.5 MG/DL (ref 8.8–10)
CHLORIDE SERPL-SCNC: 107 MMOL/L (ref 98–107)
CO2 SERPL-SCNC: 24 MMOL/L (ref 23–31)
CREAT SERPL-MCNC: 0.68 MG/DL (ref 0.72–1.25)
CREAT/UREA NIT SERPL: 24
EOSINOPHIL # BLD AUTO: 0.26 X10(3)/MCL (ref 0–0.9)
EOSINOPHIL NFR BLD AUTO: 2.6 %
ERYTHROCYTE [DISTWIDTH] IN BLOOD BY AUTOMATED COUNT: 15.5 % (ref 11.5–17)
GFR SERPLBLD CREATININE-BSD FMLA CKD-EPI: >60 ML/MIN/1.73/M2
GLUCOSE SERPL-MCNC: 149 MG/DL (ref 82–115)
GROUP & RH: NORMAL
HCT VFR BLD AUTO: 42.3 % (ref 42–52)
HGB BLD-MCNC: 13.5 G/DL (ref 14–18)
IMM GRANULOCYTES # BLD AUTO: 0.03 X10(3)/MCL (ref 0–0.04)
IMM GRANULOCYTES NFR BLD AUTO: 0.3 %
INDIRECT COOMBS: NORMAL
LYMPHOCYTES # BLD AUTO: 1.52 X10(3)/MCL (ref 0.6–4.6)
LYMPHOCYTES NFR BLD AUTO: 15 %
MCH RBC QN AUTO: 27 PG (ref 27–31)
MCHC RBC AUTO-ENTMCNC: 31.9 G/DL (ref 33–36)
MCV RBC AUTO: 84.6 FL (ref 80–94)
MONOCYTES # BLD AUTO: 0.8 X10(3)/MCL (ref 0.1–1.3)
MONOCYTES NFR BLD AUTO: 7.9 %
NEUTROPHILS # BLD AUTO: 7.46 X10(3)/MCL (ref 2.1–9.2)
NEUTROPHILS NFR BLD AUTO: 73.5 %
NRBC BLD AUTO-RTO: 0 %
PLATELET # BLD AUTO: 190 X10(3)/MCL (ref 130–400)
PMV BLD AUTO: 9.4 FL (ref 7.4–10.4)
POCT GLUCOSE: 156 MG/DL (ref 70–110)
POCT GLUCOSE: 161 MG/DL (ref 70–110)
POTASSIUM SERPL-SCNC: 4 MMOL/L (ref 3.5–5.1)
RBC # BLD AUTO: 5 X10(6)/MCL (ref 4.7–6.1)
SODIUM SERPL-SCNC: 140 MMOL/L (ref 136–145)
SPECIMEN OUTDATE: NORMAL
WBC # BLD AUTO: 10.14 X10(3)/MCL (ref 4.5–11.5)

## 2025-04-30 PROCEDURE — 25000003 PHARM REV CODE 250

## 2025-04-30 PROCEDURE — 36000710: Performed by: NEUROLOGICAL SURGERY

## 2025-04-30 PROCEDURE — 36415 COLL VENOUS BLD VENIPUNCTURE: CPT | Performed by: NEUROLOGICAL SURGERY

## 2025-04-30 PROCEDURE — C1713 ANCHOR/SCREW BN/BN,TIS/BN: HCPCS | Performed by: NEUROLOGICAL SURGERY

## 2025-04-30 PROCEDURE — 94761 N-INVAS EAR/PLS OXIMETRY MLT: CPT

## 2025-04-30 PROCEDURE — 27201423 OPTIME MED/SURG SUP & DEVICES STERILE SUPPLY: Performed by: NEUROLOGICAL SURGERY

## 2025-04-30 PROCEDURE — 63600175 PHARM REV CODE 636 W HCPCS: Performed by: ANESTHESIOLOGY

## 2025-04-30 PROCEDURE — 63600175 PHARM REV CODE 636 W HCPCS: Performed by: NEUROLOGICAL SURGERY

## 2025-04-30 PROCEDURE — 36000711: Performed by: NEUROLOGICAL SURGERY

## 2025-04-30 PROCEDURE — 71000039 HC RECOVERY, EACH ADD'L HOUR: Performed by: NEUROLOGICAL SURGERY

## 2025-04-30 PROCEDURE — 37000009 HC ANESTHESIA EA ADD 15 MINS: Performed by: NEUROLOGICAL SURGERY

## 2025-04-30 PROCEDURE — 63600175 PHARM REV CODE 636 W HCPCS

## 2025-04-30 PROCEDURE — 80048 BASIC METABOLIC PNL TOTAL CA: CPT | Performed by: NEUROLOGICAL SURGERY

## 2025-04-30 PROCEDURE — 71000033 HC RECOVERY, INTIAL HOUR: Performed by: NEUROLOGICAL SURGERY

## 2025-04-30 PROCEDURE — 71000016 HC POSTOP RECOV ADDL HR: Performed by: NEUROLOGICAL SURGERY

## 2025-04-30 PROCEDURE — 85025 COMPLETE CBC W/AUTO DIFF WBC: CPT | Performed by: NEUROLOGICAL SURGERY

## 2025-04-30 PROCEDURE — 94799 UNLISTED PULMONARY SVC/PX: CPT | Mod: XB

## 2025-04-30 PROCEDURE — 25000003 PHARM REV CODE 250: Performed by: NEUROLOGICAL SURGERY

## 2025-04-30 PROCEDURE — 82962 GLUCOSE BLOOD TEST: CPT | Performed by: NEUROLOGICAL SURGERY

## 2025-04-30 PROCEDURE — 0NR00JZ REPLACEMENT OF SKULL WITH SYNTHETIC SUBSTITUTE, OPEN APPROACH: ICD-10-PCS | Performed by: NEUROLOGICAL SURGERY

## 2025-04-30 PROCEDURE — 86900 BLOOD TYPING SEROLOGIC ABO: CPT | Performed by: NEUROLOGICAL SURGERY

## 2025-04-30 PROCEDURE — 27800903 OPTIME MED/SURG SUP & DEVICES OTHER IMPLANTS: Performed by: NEUROLOGICAL SURGERY

## 2025-04-30 PROCEDURE — 97161 PT EVAL LOW COMPLEX 20 MIN: CPT

## 2025-04-30 PROCEDURE — 11000001 HC ACUTE MED/SURG PRIVATE ROOM

## 2025-04-30 PROCEDURE — 71000015 HC POSTOP RECOV 1ST HR: Performed by: NEUROLOGICAL SURGERY

## 2025-04-30 PROCEDURE — 99900031 HC PATIENT EDUCATION (STAT)

## 2025-04-30 PROCEDURE — 37000008 HC ANESTHESIA 1ST 15 MINUTES: Performed by: NEUROLOGICAL SURGERY

## 2025-04-30 DEVICE — DURAMATRIX ONLAY PLUS 4X5: Type: IMPLANTABLE DEVICE | Site: SCALP | Status: FUNCTIONAL

## 2025-04-30 RX ORDER — ACETAMINOPHEN 325 MG/1
650 TABLET ORAL EVERY 4 HOURS PRN
Status: DISCONTINUED | OUTPATIENT
Start: 2025-04-30 | End: 2025-05-02 | Stop reason: HOSPADM

## 2025-04-30 RX ORDER — TRAZODONE HYDROCHLORIDE 150 MG/1
150 TABLET ORAL NIGHTLY
Status: DISCONTINUED | OUTPATIENT
Start: 2025-04-30 | End: 2025-05-02 | Stop reason: HOSPADM

## 2025-04-30 RX ORDER — TALC
3 POWDER (GRAM) TOPICAL NIGHTLY
Status: DISCONTINUED | OUTPATIENT
Start: 2025-04-30 | End: 2025-05-02 | Stop reason: HOSPADM

## 2025-04-30 RX ORDER — LIDOCAINE HYDROCHLORIDE AND EPINEPHRINE 5; 5 MG/ML; UG/ML
INJECTION, SOLUTION INFILTRATION; PERINEURAL
Status: DISCONTINUED | OUTPATIENT
Start: 2025-04-30 | End: 2025-04-30 | Stop reason: HOSPADM

## 2025-04-30 RX ORDER — ATORVASTATIN CALCIUM 40 MG/1
40 TABLET, FILM COATED ORAL DAILY
Status: DISCONTINUED | OUTPATIENT
Start: 2025-04-30 | End: 2025-05-02 | Stop reason: HOSPADM

## 2025-04-30 RX ORDER — ACETAMINOPHEN 650 MG/1
650 SUPPOSITORY RECTAL EVERY 4 HOURS PRN
Status: DISCONTINUED | OUTPATIENT
Start: 2025-04-30 | End: 2025-04-30 | Stop reason: SDUPTHER

## 2025-04-30 RX ORDER — METOPROLOL TARTRATE 50 MG/1
50 TABLET ORAL 2 TIMES DAILY
Status: DISCONTINUED | OUTPATIENT
Start: 2025-04-30 | End: 2025-05-02 | Stop reason: HOSPADM

## 2025-04-30 RX ORDER — ENOXAPARIN SODIUM 100 MG/ML
40 INJECTION SUBCUTANEOUS EVERY 24 HOURS
Status: DISCONTINUED | OUTPATIENT
Start: 2025-05-02 | End: 2025-05-02 | Stop reason: HOSPADM

## 2025-04-30 RX ORDER — HYDROCODONE BITARTRATE AND ACETAMINOPHEN 5; 325 MG/1; MG/1
1 TABLET ORAL EVERY 4 HOURS PRN
Refills: 0 | Status: DISCONTINUED | OUTPATIENT
Start: 2025-04-30 | End: 2025-05-02 | Stop reason: HOSPADM

## 2025-04-30 RX ORDER — POLYETHYLENE GLYCOL 3350 17 G/17G
17 POWDER, FOR SOLUTION ORAL 2 TIMES DAILY PRN
Status: DISCONTINUED | OUTPATIENT
Start: 2025-04-30 | End: 2025-05-02 | Stop reason: HOSPADM

## 2025-04-30 RX ORDER — LOSARTAN POTASSIUM 50 MG/1
100 TABLET ORAL DAILY
Status: DISCONTINUED | OUTPATIENT
Start: 2025-04-30 | End: 2025-05-02 | Stop reason: HOSPADM

## 2025-04-30 RX ORDER — INSULIN GLARGINE 100 [IU]/ML
25 INJECTION, SOLUTION SUBCUTANEOUS DAILY
Status: DISCONTINUED | OUTPATIENT
Start: 2025-04-30 | End: 2025-05-02 | Stop reason: HOSPADM

## 2025-04-30 RX ORDER — BACITRACIN 500 [USP'U]/G
OINTMENT TOPICAL
Status: DISCONTINUED | OUTPATIENT
Start: 2025-04-30 | End: 2025-04-30 | Stop reason: HOSPADM

## 2025-04-30 RX ORDER — ONDANSETRON HYDROCHLORIDE 2 MG/ML
INJECTION, SOLUTION INTRAVENOUS
Status: DISCONTINUED | OUTPATIENT
Start: 2025-04-30 | End: 2025-04-30

## 2025-04-30 RX ORDER — PHENYLEPHRINE HYDROCHLORIDE 10 MG/ML
INJECTION INTRAVENOUS
Status: DISCONTINUED | OUTPATIENT
Start: 2025-04-30 | End: 2025-04-30

## 2025-04-30 RX ORDER — FENTANYL CITRATE 50 UG/ML
INJECTION, SOLUTION INTRAMUSCULAR; INTRAVENOUS
Status: DISCONTINUED | OUTPATIENT
Start: 2025-04-30 | End: 2025-04-30

## 2025-04-30 RX ORDER — PROPOFOL 10 MG/ML
VIAL (ML) INTRAVENOUS
Status: DISCONTINUED | OUTPATIENT
Start: 2025-04-30 | End: 2025-04-30

## 2025-04-30 RX ORDER — DEXAMETHASONE SODIUM PHOSPHATE 4 MG/ML
INJECTION, SOLUTION INTRA-ARTICULAR; INTRALESIONAL; INTRAMUSCULAR; INTRAVENOUS; SOFT TISSUE
Status: DISCONTINUED | OUTPATIENT
Start: 2025-04-30 | End: 2025-04-30

## 2025-04-30 RX ORDER — METHYLPHENIDATE HYDROCHLORIDE 10 MG/1
20 TABLET ORAL DAILY
Refills: 0 | Status: DISCONTINUED | OUTPATIENT
Start: 2025-04-30 | End: 2025-05-02 | Stop reason: HOSPADM

## 2025-04-30 RX ORDER — GLUCAGON 1 MG
1 KIT INJECTION
Status: DISCONTINUED | OUTPATIENT
Start: 2025-04-30 | End: 2025-05-02 | Stop reason: HOSPADM

## 2025-04-30 RX ORDER — HYDROMORPHONE HYDROCHLORIDE 2 MG/ML
0.2 INJECTION, SOLUTION INTRAMUSCULAR; INTRAVENOUS; SUBCUTANEOUS EVERY 5 MIN PRN
Status: DISCONTINUED | OUTPATIENT
Start: 2025-04-30 | End: 2025-04-30 | Stop reason: HOSPADM

## 2025-04-30 RX ORDER — INSULIN ASPART 100 [IU]/ML
0-10 INJECTION, SOLUTION INTRAVENOUS; SUBCUTANEOUS
Status: DISCONTINUED | OUTPATIENT
Start: 2025-04-30 | End: 2025-05-02 | Stop reason: HOSPADM

## 2025-04-30 RX ORDER — MIDAZOLAM HYDROCHLORIDE 1 MG/ML
INJECTION INTRAMUSCULAR; INTRAVENOUS
Status: DISCONTINUED | OUTPATIENT
Start: 2025-04-30 | End: 2025-04-30

## 2025-04-30 RX ORDER — CEFAZOLIN 2 G/1
2 INJECTION, POWDER, FOR SOLUTION INTRAMUSCULAR; INTRAVENOUS
Status: COMPLETED | OUTPATIENT
Start: 2025-04-30 | End: 2025-05-01

## 2025-04-30 RX ORDER — FAMOTIDINE 10 MG/ML
20 INJECTION, SOLUTION INTRAVENOUS EVERY 12 HOURS
Status: DISCONTINUED | OUTPATIENT
Start: 2025-04-30 | End: 2025-05-02 | Stop reason: HOSPADM

## 2025-04-30 RX ORDER — GLUCAGON 1 MG
1 KIT INJECTION
Status: DISCONTINUED | OUTPATIENT
Start: 2025-04-30 | End: 2025-04-30 | Stop reason: HOSPADM

## 2025-04-30 RX ORDER — MORPHINE SULFATE 4 MG/ML
2 INJECTION, SOLUTION INTRAMUSCULAR; INTRAVENOUS EVERY 4 HOURS PRN
Refills: 0 | Status: DISCONTINUED | OUTPATIENT
Start: 2025-04-30 | End: 2025-05-02 | Stop reason: HOSPADM

## 2025-04-30 RX ORDER — CEFAZOLIN SODIUM 2 G/50ML
2 SOLUTION INTRAVENOUS ONCE
Status: COMPLETED | OUTPATIENT
Start: 2025-04-30 | End: 2025-04-30

## 2025-04-30 RX ORDER — IBUPROFEN 200 MG
16 TABLET ORAL
Status: DISCONTINUED | OUTPATIENT
Start: 2025-04-30 | End: 2025-05-02 | Stop reason: HOSPADM

## 2025-04-30 RX ORDER — ONDANSETRON HYDROCHLORIDE 2 MG/ML
4 INJECTION, SOLUTION INTRAVENOUS EVERY 6 HOURS PRN
Status: DISCONTINUED | OUTPATIENT
Start: 2025-04-30 | End: 2025-05-02 | Stop reason: HOSPADM

## 2025-04-30 RX ORDER — LIDOCAINE HYDROCHLORIDE 20 MG/ML
INJECTION, SOLUTION EPIDURAL; INFILTRATION; INTRACAUDAL; PERINEURAL
Status: DISCONTINUED | OUTPATIENT
Start: 2025-04-30 | End: 2025-04-30

## 2025-04-30 RX ORDER — ACETAMINOPHEN 650 MG/1
650 SUPPOSITORY RECTAL EVERY 4 HOURS PRN
Status: DISCONTINUED | OUTPATIENT
Start: 2025-04-30 | End: 2025-05-02 | Stop reason: HOSPADM

## 2025-04-30 RX ORDER — IBUPROFEN 200 MG
24 TABLET ORAL
Status: DISCONTINUED | OUTPATIENT
Start: 2025-04-30 | End: 2025-05-02 | Stop reason: HOSPADM

## 2025-04-30 RX ORDER — ROCURONIUM BROMIDE 10 MG/ML
INJECTION, SOLUTION INTRAVENOUS
Status: DISCONTINUED | OUTPATIENT
Start: 2025-04-30 | End: 2025-04-30

## 2025-04-30 RX ORDER — SODIUM CHLORIDE 9 MG/ML
INJECTION, SOLUTION INTRAVENOUS CONTINUOUS
Status: DISCONTINUED | OUTPATIENT
Start: 2025-04-30 | End: 2025-05-02 | Stop reason: HOSPADM

## 2025-04-30 RX ORDER — PROCHLORPERAZINE EDISYLATE 5 MG/ML
5 INJECTION INTRAMUSCULAR; INTRAVENOUS EVERY 6 HOURS PRN
Status: DISCONTINUED | OUTPATIENT
Start: 2025-04-30 | End: 2025-05-02 | Stop reason: HOSPADM

## 2025-04-30 RX ADMIN — PHENYLEPHRINE HYDROCHLORIDE 100 MCG: 10 INJECTION INTRAVENOUS at 07:04

## 2025-04-30 RX ADMIN — INSULIN GLARGINE 25 UNITS: 100 INJECTION, SOLUTION SUBCUTANEOUS at 10:04

## 2025-04-30 RX ADMIN — FENTANYL CITRATE 100 MCG: 50 INJECTION, SOLUTION INTRAMUSCULAR; INTRAVENOUS at 06:04

## 2025-04-30 RX ADMIN — PROPOFOL 150 MG: 10 INJECTION, EMULSION INTRAVENOUS at 06:04

## 2025-04-30 RX ADMIN — LIDOCAINE HYDROCHLORIDE 80 MG: 20 INJECTION, SOLUTION EPIDURAL; INFILTRATION; INTRACAUDAL; PERINEURAL at 06:04

## 2025-04-30 RX ADMIN — FENTANYL CITRATE 50 MCG: 50 INJECTION, SOLUTION INTRAMUSCULAR; INTRAVENOUS at 08:04

## 2025-04-30 RX ADMIN — CEFAZOLIN SODIUM 2 G: 2 SOLUTION INTRAVENOUS at 07:04

## 2025-04-30 RX ADMIN — Medication 3 MG: at 09:04

## 2025-04-30 RX ADMIN — SODIUM CHLORIDE: 9 INJECTION, SOLUTION INTRAVENOUS at 01:04

## 2025-04-30 RX ADMIN — FENTANYL CITRATE 50 MCG: 50 INJECTION, SOLUTION INTRAMUSCULAR; INTRAVENOUS at 07:04

## 2025-04-30 RX ADMIN — ONDANSETRON 4 MG: 2 INJECTION INTRAMUSCULAR; INTRAVENOUS at 07:04

## 2025-04-30 RX ADMIN — HYDROCODONE BITARTRATE AND ACETAMINOPHEN 1 TABLET: 5; 325 TABLET ORAL at 04:04

## 2025-04-30 RX ADMIN — HYDROMORPHONE HYDROCHLORIDE 0.2 MG: 2 INJECTION, SOLUTION INTRAMUSCULAR; INTRAVENOUS; SUBCUTANEOUS at 08:04

## 2025-04-30 RX ADMIN — METOPROLOL TARTRATE 50 MG: 50 TABLET, FILM COATED ORAL at 09:04

## 2025-04-30 RX ADMIN — FAMOTIDINE 20 MG: 10 INJECTION, SOLUTION INTRAVENOUS at 10:04

## 2025-04-30 RX ADMIN — MIDAZOLAM HYDROCHLORIDE 2 MG: 1 INJECTION, SOLUTION INTRAMUSCULAR; INTRAVENOUS at 06:04

## 2025-04-30 RX ADMIN — ROCURONIUM BROMIDE 20 MG: 10 SOLUTION INTRAVENOUS at 07:04

## 2025-04-30 RX ADMIN — INSULIN ASPART 2 UNITS: 100 INJECTION, SOLUTION INTRAVENOUS; SUBCUTANEOUS at 10:04

## 2025-04-30 RX ADMIN — LOSARTAN POTASSIUM 100 MG: 50 TABLET, FILM COATED ORAL at 10:04

## 2025-04-30 RX ADMIN — CEFAZOLIN 2 G: 2 INJECTION, POWDER, FOR SOLUTION INTRAMUSCULAR; INTRAVENOUS at 04:04

## 2025-04-30 RX ADMIN — DEXAMETHASONE SODIUM PHOSPHATE 4 MG: 4 INJECTION, SOLUTION INTRA-ARTICULAR; INTRALESIONAL; INTRAMUSCULAR; INTRAVENOUS; SOFT TISSUE at 07:04

## 2025-04-30 RX ADMIN — PROPOFOL 50 MG: 10 INJECTION, EMULSION INTRAVENOUS at 06:04

## 2025-04-30 RX ADMIN — SUGAMMADEX 200 MG: 100 INJECTION, SOLUTION INTRAVENOUS at 07:04

## 2025-04-30 RX ADMIN — SODIUM CHLORIDE, SODIUM GLUCONATE, SODIUM ACETATE, POTASSIUM CHLORIDE AND MAGNESIUM CHLORIDE: 526; 502; 368; 37; 30 INJECTION, SOLUTION INTRAVENOUS at 06:04

## 2025-04-30 RX ADMIN — TRAZODONE HYDROCHLORIDE 150 MG: 150 TABLET ORAL at 09:04

## 2025-04-30 RX ADMIN — CEFAZOLIN 2 G: 2 INJECTION, POWDER, FOR SOLUTION INTRAMUSCULAR; INTRAVENOUS at 11:04

## 2025-04-30 RX ADMIN — ATORVASTATIN CALCIUM 40 MG: 40 TABLET, FILM COATED ORAL at 10:04

## 2025-04-30 RX ADMIN — ROCURONIUM BROMIDE 50 MG: 10 SOLUTION INTRAVENOUS at 06:04

## 2025-04-30 RX ADMIN — METHYLPHENIDATE HYDROCHLORIDE 20 MG: 10 TABLET ORAL at 10:04

## 2025-04-30 RX ADMIN — FAMOTIDINE 20 MG: 10 INJECTION, SOLUTION INTRAVENOUS at 09:04

## 2025-04-30 RX ADMIN — SODIUM CHLORIDE, SODIUM GLUCONATE, SODIUM ACETATE, POTASSIUM CHLORIDE AND MAGNESIUM CHLORIDE: 526; 502; 368; 37; 30 INJECTION, SOLUTION INTRAVENOUS at 07:04

## 2025-04-30 NOTE — OP NOTE
[facility]                       [address] [address]    PATIENT NAME:      RICK MAHAN   YOB: 1960  CSN:               [id number]  MRN:               [id number]  ADMIT DATE:        04/30/2025 04:56:00  PHYSICIAN:         Samir Garcia MD                          OPERATIVE REPORT      DATE OF SURGERY:    04/30/2025 00:00:00    SURGEON:  Samir Garcia MD    ASSISTANT:  RICH Joseph.    PREOPERATIVE DIAGNOSIS:  Right-sided cranial defect, status post previous   surgery.    POSTOPERATIVE DIAGNOSIS:  Right-sided cranial defect, status post previous   surgery.    PROCEDURE:  Right cranioplasty using PEEK system and plating system.    There were no issues, no complications.    INDICATION FOR SURGERY:  This is a 65-year-old, now here for cranioplasty,   previous bone removal, and is improved quite a bit .  Consent and risks were   discussed.  Risks of bleeding, infection, hemorrhage, weakness, reoperation were   discussed in detail and they want me to proceed with surgery.    OPERATIVE PROCEDURE:  The patient was brought to the operating room, head put in   pins with a bump on the right side.  We shaved the previous area where the   incision was and then we sterilely prepped and draped the head.  We opened the   incision, obtained hemostasis, and put in some Moustapha clips reflecting everything   forward.  We cut the scar tissue off the brain and duraplasty.  Once that was   done, the wound was irrigated multiple times.  A new DuraGen fibrin glue was   placed over the defect and we put the PEEK implant  attached with raegan hole   covers and dog bone and different screws.  Once that was nicely placed,  the   wound was irrigated multiple times.  Hemostasis obtained.  A drain was left in place,   secured.  The wound was closed with 3-0 Vicryl and staples.  There were no   issues, no complications.  .    I discussed  the care with the family.  The patient was then taken out of pins.        ______________________________  MD BRIA Knapp/CHRIS  DD:  04/30/2025  Time:  07:40AM  DT:  04/30/2025  Time:  02:09PM  Job #:  [id number]      OPERATIVE REPORT

## 2025-04-30 NOTE — ANESTHESIA POSTPROCEDURE EVALUATION
Anesthesia Post Evaluation    Patient: Ezequiel Ramires    Procedure(s) Performed: Procedure(s) (LRB):  CRANIOPLASTY (Right)    Final Anesthesia Type: general      Patient location during evaluation: PACU  Patient participation: Yes- Able to Participate  Level of consciousness: awake and alert  Post-procedure vital signs: reviewed and stable  Pain management: adequate  Airway patency: patent    PONV status at discharge: No PONV  Anesthetic complications: no      Cardiovascular status: hemodynamically stable  Respiratory status: spontaneous ventilation and room air  Hydration status: euvolemic  Follow-up not needed.              Vitals Value Taken Time   /69 04/30/25 12:31   Temp 36.4 °C (97.5 °F) 04/30/25 08:06   Pulse 97 04/30/25 12:34   Resp 13 04/30/25 12:34   SpO2 99 % 04/30/25 12:34   Vitals shown include unfiled device data.      No case tracking events are documented in the log.      Pain/Emily Score: Pain Rating Prior to Med Admin: 7 (4/30/2025  8:35 AM)  Emily Score: 10 (4/30/2025 10:30 AM)

## 2025-04-30 NOTE — PT/OT/SLP EVAL
Physical Therapy Evaluation    Patient Name:  Ezequiel Ramires   MRN:  [id number]    Recommendations:     Discharge therapy intensity: Low Intensity Therapy   Discharge Equipment Recommendations: none   Barriers to discharge: None    Assessment:     Ezequiel Ramires is a 65 y.o. male admitted with a medical diagnosis of s/p cranioplasty. Pt with recent hospitalization at this facility for SDH where he underwent R craniectomy and d/nelson to [facility]. Now currently receiving OP PT/OT/ST services 2x/wk. He presents with the following impairments/functional limitations: impaired endurance, impaired functional mobility, gait instability, impaired balance, weakness, impaired self care skills.     Pt tolerated PT eval well. He is SBA-SPV for all mobility and able to ambulate x 120' with SBA and no device. Pt did have one minor R lateral LOB while ambulating but able to self-recover. Will follow pt 3x/wk to continue improving balance while inpatient, but is safe to d/c home and resume low intensity therapy.    Rehab Prognosis: Good; patient would benefit from acute skilled PT services to address these deficits and reach maximum level of function.    Recent Surgery: Procedure(s) (LRB):  CRANIOPLASTY (Right) * Day of Surgery *    Plan:     During this hospitalization, patient would benefit from acute PT services 3 x/week to address the identified rehab impairments via gait training, therapeutic exercises, neuromuscular re-education, therapeutic activities and progress toward the following goals:    Plan of Care Expires:  05/30/25    Subjective     Chief Complaint: discomfort at IV site  Patient/Family Comments/goals: to go home  Pain/Comfort:  Pain Rating 1: 0/10    Patients cultural, spiritual, Gnosticist conflicts given the current situation: no    Living Environment:  Pt lives with his wife in a [facility] with 0 NIURKA  Prior to admission, patients level of function was SPV-Nate with occasional use of RW and w/c.  Equipment used at home: wheelchair,  walker, rolling, shower chair.  DME owned (not currently used): single point cane.  Upon discharge, patient will have assistance from spouse.    Objective:     Communicated with NSG prior to session.  Patient found HOB elevated with SCD, peripheral IV, RORO drain  upon PT entry to room.    General Precautions: Standard,    Orthopedic Precautions:N/A   Braces: N/A  Respiratory Status: Room air  Blood Pressure: 137/69, HR 90      Exams:  RLE ROM: WFL  RLE Strength: WFL  LLE ROM: WFL  LLE Strength: WFL  Skin integrity: Visible skin intact      Functional Mobility:  Bed Mobility:     Supine to Sit: stand by assistance  Sit to Supine: supervision  Transfers:     Sit to Stand:  supervision with no AD  Gait: Pt amb x 120' with SBA and no device, one minor LOB to R side but able to self-recover. Cues for maintaining safe speed  Balance: SPV-SBA overall      AM-PAC 6 CLICK MOBILITY  Total Score:22       Treatment & Education:  Patient and spouse were provided with verbal education education regarding PT role/goals/POC, fall prevention, safety awareness, and discharge/DME recommendations.  Understanding was verbalized.     Patient left HOB elevated with all lines intact, call button in reach, NSG notified, and spouse .    GOALS:   Multidisciplinary Problems       Physical Therapy Goals          Problem: Physical Therapy    Goal Priority Disciplines Outcome Interventions   Physical Therapy Goal     PT, PT/OT Progressing    Description: Goals to be met by: 25     Patient will increase functional independence with mobility by performin. Supine to sit with Modified [place]  2. Sit to supine with Modified [place]  3. Sit to stand transfer with Modified [place]  4. Gait  x 300 feet with Supervision using No Assistive Device.   5. Score >45 points on Dye Balance Scale to demo decreased risk of falls.                         History:     Past Medical History:   Diagnosis Date    Arthritis     CAD (coronary  artery disease)     Dizziness     DMII (diabetes mellitus, type 2)     HLD (hyperlipidemia)     HTN (hypertension)     Psoriasis     ST elevation (STEMI) myocardial infarction of unspecified site        Past Surgical History:   Procedure Laterality Date    COLONOSCOPY      CRANIOTOMY FOR EVACUATION OF SUBDURAL HEMATOMA Right 02/04/2025    Procedure: CRANIOTOMY, FOR SUBDURAL HEMATOMA EVACUATION;  Surgeon: Samir Garcia MD;  Location: [facility];  Service: Neurosurgery;  Laterality: Right;    LEFT HEART CATHETERIZATION      TONSILLECTOMY      TOTAL HIP ARTHROPLASTY Bilateral     WISDOM TOOTH EXTRACTION         Time Tracking:     PT Received On: 04/30/25  PT Start Time: 1501     PT Stop Time: 1518  PT Total Time (min): 17 min     Billable Minutes: Evaluation 17      04/30/2025

## 2025-04-30 NOTE — BRIEF OP NOTE
[facility]  Brief Operative Note    SUMMARY     Surgery Date: 4/30/2025     Surgeons and Role:     * Samir Garcia MD - Primary    Assisting Surgeon: Gabino Escudero PA-C    Pre-op Diagnosis:  Traumatic subdural hemorrhage without loss of consciousness, initial encounter [S06.5X0A]    Post-op Diagnosis:  Post-Op Diagnosis Codes:     * Traumatic subdural hemorrhage without loss of consciousness, initial encounter [S06.5X0A]    Procedure(s) (LRB):  CRANIOPLASTY (Right)    Right cranioplasty     Anesthesia: General    Implants:  Implant Name Type Inv. Item Serial No.  Lot No. LRB No. Used Action   DURAMATRIX ONLAY PLUS 4X5 - [id number]  DURAMATRIX ONLAY PLUS 4X5  [facility] [employer]  Right 1 Implanted   [employer]ushape Cranio Implant Lg      Right 1 Implanted   Cranial Screws      Right 23 Implanted   Cranial Screws      Right 2 Implanted       Operative Findings: Dictated    Estimated Blood Loss: * No values recorded between 4/30/2025  6:39 AM and 4/30/2025  7:47 AM *    Estimated Blood Loss has been documented.         Specimens:   Specimen (24h ago, onward)      None          * No specimens in log *    [id number]

## 2025-04-30 NOTE — ANESTHESIA PROCEDURE NOTES
Intubation    Date/Time: 4/30/2025 6:51 AM    Performed by: Navid Aden  Authorized by: Xu Seo MD    Intubation:     Induction:  Intravenous    Intubated:  Postinduction    Mask Ventilation:  Easy mask    Attempts:  1    Attempted By:  CRNA    Method of Intubation:  Video laryngoscopy    Blade:  Stevenson 3    Laryngeal View Grade: Grade I - full view of cords      Difficult Airway Encountered?: No      Complications:  None    Airway Device:  Oral endotracheal tube    Airway Device Size:  7.5    Style/Cuff Inflation:  Cuffed (inflated to minimal occlusive pressure)    Tube secured:  23    Secured at:  The lips    Placement Verified By:  Capnometry    Complicating Factors:  None    Findings Post-Intubation:  BS equal bilateral and atraumatic/condition of teeth unchanged

## 2025-04-30 NOTE — TRANSFER OF CARE
Anesthesia Transfer of Care Note    Patient: Ezequiel Ramires    Procedure(s) Performed: Procedure(s) (LRB):  CRANIOPLASTY (Right)    Patient location: PACU    Anesthesia Type: general    Transport from OR: Transported from OR on room air with adequate spontaneous ventilation    Post pain: adequate analgesia    Post assessment: no apparent anesthetic complications and tolerated procedure well    Post vital signs: stable    Level of consciousness: sedated and responds to stimulation    Nausea/Vomiting: no nausea/vomiting    Complications: none    Transfer of care protocol was followed      Last vitals: Visit Vitals  /68 (BP Location: Right arm, Patient Position: Lying)   Pulse 65   Temp 36.8 °C (98.2 °F) (Oral)   Resp 18   Wt 77.1 kg (169 lb 15.6 oz)   SpO2 99%   BMI 23.05 kg/m²

## 2025-04-30 NOTE — ANESTHESIA PREPROCEDURE EVALUATION
04/30/2025  Ezequiel Ramires is a 65 y.o., male.  Crani for SDH 2/4/25  CAD s/p stent      Pre-op Assessment    I have reviewed the Patient Summary Reports.     I have reviewed the Nursing Notes. I have reviewed the NPO Status.   I have reviewed the Medications.     Review of Systems  Anesthesia Hx:  No problems with previous Anesthesia                Social:  Former Smoker       Cardiovascular:     Hypertension  Past MI (5 yrs ago) CAD   CABG/stent (2 Stents 5 yrs ago)             Patient on beta blockers Beta blocker taken in last 24 hours                         Pulmonary:  Pulmonary Normal        Denies Sleep Apnea.                Renal/:  Renal/ Normal                 Hepatic/GI:  Hepatic/GI Normal                    Neurological:   CVA (SDH w/ residual imbalance and generalized weakness. Uses walker PRN but ordinarily ambulated without difficulty), residual symptoms                                    Endocrine:  Diabetes, well controlled, type 2, using insulin           Psych:  Psychiatric Normal                    Physical Exam  General: Well nourished, Cooperative, Alert and Oriented        Anesthesia Plan  Type of Anesthesia, risks & benefits discussed:    Anesthesia Type: Gen ETT  Intra-op Monitoring Plan: Standard ASA Monitors and Art Line  Post Op Pain Control Plan: multimodal analgesia  Induction:  IV  Airway Plan: Direct, Post-Induction  Informed Consent: Patient consented to blood products? Yes  ASA Score: 3  Day of Surgery Review of History & Physical: H&P Update referred to the surgeon/provider.    Ready For Surgery From Anesthesia Perspective.     .

## 2025-04-30 NOTE — PLAN OF CARE
Problem: Physical Therapy  Goal: Physical Therapy Goal  Description: Goals to be met by: 25     Patient will increase functional independence with mobility by performin. Supine to sit with Modified [place]  2. Sit to supine with Modified [place]  3. Sit to stand transfer with Modified [place]  4. Gait  x 300 feet with Supervision using No Assistive Device.   5. Score >45 points on Dye Balance Scale to demo decreased risk of falls.    Outcome: Progressing

## 2025-05-01 LAB
POCT GLUCOSE: 118 MG/DL (ref 70–110)
POCT GLUCOSE: 168 MG/DL (ref 70–110)
POCT GLUCOSE: 172 MG/DL (ref 70–110)
POCT GLUCOSE: 224 MG/DL (ref 70–110)
POCT GLUCOSE: 242 MG/DL (ref 70–110)

## 2025-05-01 PROCEDURE — 11000001 HC ACUTE MED/SURG PRIVATE ROOM

## 2025-05-01 PROCEDURE — 97116 GAIT TRAINING THERAPY: CPT | Mod: CQ

## 2025-05-01 PROCEDURE — 25000003 PHARM REV CODE 250

## 2025-05-01 PROCEDURE — 94799 UNLISTED PULMONARY SVC/PX: CPT | Mod: XB

## 2025-05-01 PROCEDURE — 63600175 PHARM REV CODE 636 W HCPCS

## 2025-05-01 PROCEDURE — 97165 OT EVAL LOW COMPLEX 30 MIN: CPT

## 2025-05-01 PROCEDURE — 99900035 HC TECH TIME PER 15 MIN (STAT)

## 2025-05-01 PROCEDURE — 97535 SELF CARE MNGMENT TRAINING: CPT

## 2025-05-01 PROCEDURE — 94799 UNLISTED PULMONARY SVC/PX: CPT

## 2025-05-01 RX ADMIN — Medication 3 MG: at 09:05

## 2025-05-01 RX ADMIN — INSULIN ASPART 2 UNITS: 100 INJECTION, SOLUTION INTRAVENOUS; SUBCUTANEOUS at 06:05

## 2025-05-01 RX ADMIN — INSULIN GLARGINE 25 UNITS: 100 INJECTION, SOLUTION SUBCUTANEOUS at 09:05

## 2025-05-01 RX ADMIN — FAMOTIDINE 20 MG: 10 INJECTION, SOLUTION INTRAVENOUS at 09:05

## 2025-05-01 RX ADMIN — ATORVASTATIN CALCIUM 40 MG: 40 TABLET, FILM COATED ORAL at 09:05

## 2025-05-01 RX ADMIN — CEFAZOLIN 2 G: 2 INJECTION, POWDER, FOR SOLUTION INTRAMUSCULAR; INTRAVENOUS at 09:05

## 2025-05-01 RX ADMIN — METOPROLOL TARTRATE 50 MG: 50 TABLET, FILM COATED ORAL at 09:05

## 2025-05-01 RX ADMIN — TRAZODONE HYDROCHLORIDE 150 MG: 150 TABLET ORAL at 09:05

## 2025-05-01 RX ADMIN — ACETAMINOPHEN 650 MG: 325 TABLET ORAL at 12:05

## 2025-05-01 RX ADMIN — LOSARTAN POTASSIUM 100 MG: 50 TABLET, FILM COATED ORAL at 09:05

## 2025-05-01 NOTE — PT/OT/SLP EVAL
Occupational Therapy   Evaluation and Discharge Note    Name: Ezequiel Ramires  MRN: [id number]  Admitting Diagnosis:   1. Subdural hemorrhage following injury, no loss of consciousness        Recent Surgery: Procedure(s) (LRB):  CRANIOPLASTY (Right) 1 Day Post-Op    Recommendations:     Discharge therapy intensity: Low Intensity Therapy (con't outpatient therapy)   Discharge Equipment Recommendations: none  Barriers to discharge:  None    Assessment:     Ezequiel Ramires is a 65 y.o. male with a medical diagnosis of SDH in February 2024 s/p craniectomy, d/c'd and completed rehab at [facility], now s/p cranioplasty. On eval, patient presents with excellent effort, very motivated, able to complete ADL's with setup/mod I and ambulate on unit without AD with occasional mild balance issues but able to self-correct. Skilled OT services are not warranted at this time, recommend continuing with outpatient therapy.    Plan:     OT to sign off as acute OT services are not warranted at this time.  Please re-consult if situation changes during this hospitalization.    Plan of Care Reviewed with: patient, spouse    Subjective     Chief Complaint: none  Patient/Family Comments/goals: go home, get back to cutting grass    Occupational Profile:  Living Environment: lives with wife in  home, threshold, no steps  Previous level of function: BADL's with general supervision, cues at times  Roles and Routines:   Equipment Used at Home: walker, rolling, wheelchair, shower chair  Assistance upon Discharge: yes, wife    Pain/Comfort:  Pain Rating 1: 0/10    Patients cultural, spiritual, Amish conflicts given the current situation:      Objective:     OT communicated with nsg prior to session.      Patient was found supine with peripheral IV, RORO drain upon OT entry to room.    General Precautions: Standard, fall  Orthopedic Precautions:    Braces:      Vital Signs:     Bed Mobility:    Patient completed Supine to Sit with modified  independence    Functional Mobility/Transfers:  Patient completed Sit <> Stand Transfer with modified independence  with  no assistive device   Functional Mobility: ambulated on unit without AD > community distance with SBA, occasional decreased balance but able to self correct.     Activities of Daily Living:  Donned UB/LB dress with setup/mod I, some issues with sequencing/ motor planning task at times and some issue with LUE     AMPAC 6 Click ADL:  AMPAC Total Score:      Functional Cognition:  Oriented to place, date, follow commands appropriate. Some short term memory/recall issues noted,     Visual Perceptual Skills:  Some L neglect/visual issues noted with mobility and ADL's.    Upper Extremity Function:  Right Upper Extremity:   5/5, coordination intaact    Left Upper Extremity:  4+/5, coordination intact    Balance:   Good sitting   Fair+ dynamic standing  Additional Treatment:  As above    Patient Education:  Patient and spouse were provided with verbal education education regarding OT role/goals/POC, safety awareness, and Discharge/DME recommendations.  Understanding was verbalized.     Patient left up with PT .    History:     Past Medical History:   Diagnosis Date    Arthritis     CAD (coronary artery disease)     Dizziness     DMII (diabetes mellitus, type 2)     HLD (hyperlipidemia)     HTN (hypertension)     Psoriasis     ST elevation (STEMI) myocardial infarction of unspecified site          Past Surgical History:   Procedure Laterality Date    COLONOSCOPY      CRANIOPLASTY Right 4/30/2025    Procedure: CRANIOPLASTY;  Surgeon: Samir Garcia MD;  Location: [facility];  Service: Neurosurgery;  Laterality: Right;  RIGHT SIDED CRANIOPLASTY / DRILL / COATES /  MAR    CRANIOTOMY FOR EVACUATION OF SUBDURAL HEMATOMA Right 02/04/2025    Procedure: CRANIOTOMY, FOR SUBDURAL HEMATOMA EVACUATION;  Surgeon: Samir Garcia MD;  Location: [facility] OR;  Service: Neurosurgery;  Laterality: Right;    LEFT HEART  CATHETERIZATION      TONSILLECTOMY      TOTAL HIP ARTHROPLASTY Bilateral     WISDOM TOOTH EXTRACTION         Time Tracking:     OT Date of Treatment: 05/01/25  OT Start Time: 1136  OT Stop Time: 1211  OT Total Time (min): 35 min    Billable Minutes:Evaluation low complexity  Self Care/Home Management 10 min    5/1/2025

## 2025-05-01 NOTE — PROGRESS NOTES
[facility]  Neurosurgery  Progress Note    Subjective:     Interval History: POD 1 right cranioplasty. NAEs overnight. Resting in bed. No complaints this morning. No headaches, weakness, difficulty tolerating PO, or any other complaints. RORO output 50mL. CT head done this morning. Family at bedside.     Post-Op Info:  Procedure(s) (LRB):  CRANIOPLASTY (Right)   1 Day Post-Op      Medications:  Continuous Infusions:   0.9% NaCl   Intravenous Continuous 100 mL/hr at 04/30/25 1336 New Bag at 04/30/25 1336     Scheduled Meds:   atorvastatin  40 mg Oral Daily    ceFAZolin (Ancef) IV (PEDS and ADULTS)  2 g Intravenous Q8H    [START ON 5/2/2025] enoxparin  40 mg Subcutaneous Daily    famotidine (PF)  20 mg Intravenous Q12H    insulin glargine U-100 (Lantus)  25 Units Subcutaneous Daily    losartan  100 mg Oral Daily    melatonin  3 mg Oral Nightly    methylphenidate HCl  20 mg Per G Tube Daily    metoprolol tartrate  50 mg Oral BID    traZODone  150 mg Oral QHS     PRN Meds:  Current Facility-Administered Medications:     acetaminophen, 650 mg, Rectal, Q4H PRN    acetaminophen, 650 mg, Oral, Q4H PRN    dextrose 50%, 12.5 g, Intravenous, PRN    dextrose 50%, 25 g, Intravenous, PRN    glucagon (human recombinant), 1 mg, Intramuscular, PRN    glucose, 16 g, Oral, PRN    glucose, 24 g, Oral, PRN    HYDROcodone-acetaminophen, 1 tablet, Oral, Q4H PRN    insulin aspart U-100, 0-10 Units, Subcutaneous, QID (AC + HS) PRN    morphine, 2 mg, Intravenous, Q4H PRN    ondansetron, 4 mg, Intravenous, Q6H PRN    polyethylene glycol, 17 g, Oral, BID PRN    prochlorperazine, 5 mg, Intravenous, Q6H PRN     Review of Systems  Objective:     Weight: 76.7 kg (169 lb)  Body mass index is 22.92 kg/m².  Vital Signs (Most Recent):  Temp: 97.4 °F (36.3 °C) (05/01/25 0427)  Pulse: (!) 58 (05/01/25 0427)  Resp: 18 (05/01/25 0427)  BP: 114/72 (05/01/25 0427)  SpO2: 95 % (05/01/25 0427) Vital Signs (24h Range):  Temp:  [97.4  "°F (36.3 °C)-98.5 °F (36.9 °C)] 97.4 °F (36.3 °C)  Pulse:  [55-98] 58  Resp:  [7-22] 18  SpO2:  [93 %-100 %] 95 %  BP: (114-174)/(60-97) 114/72                              Closed/Suction Drain 04/30/25 Tube - 1 Right Scalp Bulb 10 Fr. (Active)   Site Description Unable to view 04/30/25 1252   Dressing Type Gauze 04/30/25 1900   Dressing Status Intact;Dry;Old drainage 05/01/25 0400   Dressing Intervention Integrity maintained 05/01/25 0400   Drainage Serosanguineous 05/01/25 0400   Status To bulb suction 05/01/25 0400   Output (mL) 30 mL 05/01/25 0400       Neurosurgery Physical Exam  Awake, alert, oriented.   NAD. Resting in bed.   Speech clear. Answers questions appropriately.   EOMI.   Moves all extremities well  Incision CDI with st pales overlying.     Significant Labs:  Recent Labs   Lab 04/30/25  0522   *      K 4.0      CO2 24   BUN 16.6   CREATININE 0.68*   CALCIUM 9.5     Recent Labs   Lab 04/30/25  0522   WBC 10.14   HGB 13.5*   HCT 42.3        No results for input(s): "LABPT", "INR", "APTT" in the last 48 hours.  Microbiology Results (last 7 days)       ** No results found for the last 168 hours. **               Assessment/Plan:     Active Diagnoses:    Diagnosis Date Noted POA    PRINCIPAL PROBLEM:  Subdural hemorrhage following injury, no loss of consciousness [S06.5X0A] 04/30/2025 Yes      Problems Resolved During this Admission:     -CT head done this morning, pending radiology read, personally reviewed with post op changes  -RORO to half thumbprint suction  -PTOTST  -Pain control  -Hopefully home tomorrow.       RICH Joseph  Neurosurgery  [facility] - [facility]    "

## 2025-05-01 NOTE — PROGRESS NOTES
05/01/25 1503   Missed Time Reason   SLP Attempted Eval Date 05/01/25   Missed Treatment Reason Other (Comment)  (PT)

## 2025-05-01 NOTE — PLAN OF CARE
05/01/25 1259   Discharge Assessment   Assessment Type Discharge Planning Assessment   Confirmed/corrected address, phone number and insurance Yes   Confirmed Demographics Correct on Facesheet   Source of Information patient   Communicated SHYLA with patient/caregiver Yes   Reason For Admission SDH, cranioplsty   People in Home spouse   Do you expect to return to your current living situation? Yes   Do you have help at home or someone to help you manage your care at home? Yes   Who are your caregiver(s) and their phone number(s)? tammie glover, spouse, [phone]   Prior to hospitilization cognitive status: Unable to Assess   Current cognitive status: Alert/Oriented   Home Accessibility wheelchair accessible   Home Layout Able to live on 1st floor   Equipment Currently Used at Home wheelchair;walker, rolling;cane, straight;shower chair   Readmission within 30 days? No   Patient currently being followed by outpatient case management? No   Do you currently have service(s) that help you manage your care at home? No   Do you take prescription medications? Yes   Do you have prescription coverage? Yes   Coverage [employer], [employer]BS   Do you have any problems affording any of your prescribed medications? No   Is the patient taking medications as prescribed? yes   Who is going to help you get home at discharge? family   How do you get to doctors appointments? family or friend will provide   Are you on dialysis? No   Do you take coumadin? No   Discharge Plan A Home   Discharge Plan B Home   DME Needed Upon Discharge  none   Discharge Plan discussed with: Patient   Transition of Care Barriers None   OTHER   Name(s) of People in Home spouse     Completed assessment with pt and spouse at bedside. Introduced self and explained role as SW. Pt verb understanding to all questions asked. PCP is Davy Stacy. Pt is current with [facility] therapy clinic and plan to resume therapy services in 2 weeks after f/u with Dr. Garcia. D/c home  once drain is pulled.     Deepa Baird LCSW

## 2025-05-01 NOTE — PT/OT/SLP PROGRESS
"Physical Therapy Treatment    Patient Name:  Ezequiel Ramires   MRN:  [id number]    Recommendations:     Discharge therapy intensity: Low Intensity Therapy   Discharge Equipment Recommendations: none  Barriers to discharge: Ongoing medical needs    Assessment:     Ezequiel Ramires is a 65 y.o. male admitted with a medical diagnosis of s/p cranioplasty. Pt with recent hospitalization at this facility for SDH where he underwent R craniectomy and d/nelson to [facility]. Now currently receiving OP PT/OT/ST services 2x/wk .  He presents with the following impairments/functional limitations: impaired endurance, impaired functional mobility, gait instability, impaired balance, weakness, impaired self care skills.    Rehab Prognosis: Good; patient would benefit from acute skilled PT services to address these deficits and reach maximum level of function.    Recent Surgery: Procedure(s) (LRB):  CRANIOPLASTY (Right) 1 Day Post-Op    Plan:     During this hospitalization, patient would benefit from acute PT services 3 x/week to address the identified rehab impairments via gait training, therapeutic exercises, neuromuscular re-education, therapeutic activities and progress toward the following goals:    Plan of Care Expires:  05/30/25    Subjective     Chief Complaint: none stated  Patient/Family Comments/goals: "I want to jog"  Pain/Comfort:  Pain Rating 1: 0/10      Objective:     Communicated with nurse prior to session.  Patient found ambulatory in room/corey with SCD, peripheral IV, RORO drain upon PT entry to room.     General Precautions: Standard,    Orthopedic Precautions: N/A  Braces: N/A  Respiratory Status: Room air  Blood Pressure: nt  Skin Integrity: Visible skin intact      Functional Mobility:  Transfers:  Sit to Stand:  contact guard assistance with no AD  Gait: patient amb 300ft with no AD with CGA/ patient presents with slight LOB to left able to recover.  Stairs:  Pt ascended/descended 22 stair(s) with No Assistive Device with right " handrail with Contact Guard Assistance.       Education:  Patient provided with verbal education education regarding PT role/goals/POC, post-op precautions, fall prevention, and safety awareness.  Understanding was verbalized.     Patient left sitting on couch with all lines intact, call button in reach, and wife present    GOALS:   Multidisciplinary Problems       Physical Therapy Goals          Problem: Physical Therapy    Goal Priority Disciplines Outcome Interventions   Physical Therapy Goal     PT, PT/OT Progressing    Description: Goals to be met by: 25     Patient will increase functional independence with mobility by performin. Supine to sit with Modified Parke  2. Sit to supine with Modified Parke  3. Sit to stand transfer with Modified Parke  4. Gait  x 300 feet with Supervision using No Assistive Device.   5. Score >45 points on Dye Balance Scale to demo decreased risk of falls.                         Time Tracking:     PT Received On: 25  PT Start Time: 1205     PT Stop Time: 1220  PT Total Time (min): 15 min     Billable Minutes: Gait Training 15    Treatment Type: Treatment  PT/PTA: PTA     Number of PTA visits since last PT visit: 2025

## 2025-05-02 VITALS
SYSTOLIC BLOOD PRESSURE: 119 MMHG | DIASTOLIC BLOOD PRESSURE: 70 MMHG | HEIGHT: 72 IN | OXYGEN SATURATION: 94 % | RESPIRATION RATE: 18 BRPM | TEMPERATURE: 98 F | BODY MASS INDEX: 22.89 KG/M2 | WEIGHT: 169 LBS | HEART RATE: 80 BPM

## 2025-05-02 LAB
POCT GLUCOSE: 123 MG/DL (ref 70–110)
POCT GLUCOSE: 131 MG/DL (ref 70–110)

## 2025-05-02 PROCEDURE — 25000003 PHARM REV CODE 250

## 2025-05-02 PROCEDURE — 94799 UNLISTED PULMONARY SVC/PX: CPT

## 2025-05-02 PROCEDURE — 63600175 PHARM REV CODE 636 W HCPCS

## 2025-05-02 RX ADMIN — ATORVASTATIN CALCIUM 40 MG: 40 TABLET, FILM COATED ORAL at 08:05

## 2025-05-02 RX ADMIN — INSULIN GLARGINE 25 UNITS: 100 INJECTION, SOLUTION SUBCUTANEOUS at 08:05

## 2025-05-02 RX ADMIN — HYDROCODONE BITARTRATE AND ACETAMINOPHEN 1 TABLET: 5; 325 TABLET ORAL at 08:05

## 2025-05-02 RX ADMIN — METOPROLOL TARTRATE 50 MG: 50 TABLET, FILM COATED ORAL at 08:05

## 2025-05-02 RX ADMIN — LOSARTAN POTASSIUM 100 MG: 50 TABLET, FILM COATED ORAL at 08:05

## 2025-05-02 RX ADMIN — FAMOTIDINE 20 MG: 10 INJECTION, SOLUTION INTRAVENOUS at 08:05

## 2025-05-02 NOTE — NURSING
Pt discharge paperwork reviewed and all questions encouraged and answered. Pt and wife verbalized understanding of discharge paperwork. IV removed. RORO drain safety pinned to his shirt. Left via wheelchair by transport. All belongings gathered.

## 2025-05-02 NOTE — PLAN OF CARE
Problem: Delirium  Goal: Optimal Coping  Outcome: Progressing  Goal: Improved Behavioral Control  Outcome: Progressing  Goal: Improved Attention and Thought Clarity  Outcome: Progressing  Intervention: Maximize Cognitive Function  Flowsheets (Taken 5/2/2025 1024)  Reorientation Measures: reorientation provided  Sensory Stimulation Regulation:   quiet environment promoted   care clustered  Goal: Improved Sleep  Outcome: Progressing     Problem: Adult Inpatient Plan of Care  Goal: Plan of Care Review  Outcome: Progressing  Goal: Patient-Specific Goal (Individualized)  Outcome: Progressing  Goal: Absence of Hospital-Acquired Illness or Injury  Outcome: Progressing  Goal: Optimal Comfort and Wellbeing  Outcome: Progressing  Goal: Readiness for Transition of Care  Outcome: Progressing     Problem: Wound  Goal: Optimal Coping  Outcome: Progressing  Goal: Optimal Functional Ability  Outcome: Progressing  Goal: Absence of Infection Signs and Symptoms  Outcome: Progressing  Goal: Improved Oral Intake  Outcome: Progressing  Goal: Optimal Pain Control and Function  Outcome: Progressing  Intervention: Prevent or Manage Pain  Flowsheets (Taken 5/2/2025 1024)  Sleep/Rest Enhancement: family presence promoted  Pain Management Interventions:   care clustered   medication offered   quiet environment facilitated   position adjusted   pain management plan reviewed with patient/caregiver  Goal: Skin Health and Integrity  Outcome: Progressing  Goal: Optimal Wound Healing  Outcome: Progressing     Problem: Infection  Goal: Absence of Infection Signs and Symptoms  Outcome: Progressing

## 2025-05-02 NOTE — DISCHARGE INSTRUCTIONS
Ok to leave dressing open to air. The drain should be compressed by half a thumbprint. Do not put any ointment, cream, or lotion on incisions. Do not peel off steri strips.  No pushing, lifting, pulling anything for 6 weeks. Do not lift anything over 10 pounds. Dr. Garcia will let you know if any changes will be made regarding your activity at your follow up appointment.   Keep automobile riding to a minimum and do not drive until cleared by doctor.   Do not drive while taking pain medications.   Call the doctor if you have a temperature over 100.4, dizziness, light headedness, redness, tenderness, and other infection signs (pain, swelling, redness, odor, green or yellow drainage).  Make sure to have a bowel movement at least every 3 days. It is ok to take stool softeners/laxatives if needed.

## 2025-05-02 NOTE — PLAN OF CARE
05/02/25 0816   Final Note   Assessment Type Final Discharge Note   Anticipated Discharge Disposition Home   Post-Acute Status   Discharge Delays None known at this time     Pt to d/c home with spouse. No CM needs known at this time.     Deepa Baird LCSW

## 2025-05-02 NOTE — PLAN OF CARE
Problem: Delirium  Goal: Improved Sleep  Outcome: Progressing  Intervention: Promote Sleep  Flowsheets (Taken 5/2/2025 0223)  Sleep/Rest Enhancement:   awakenings minimized   consistent schedule promoted   family presence promoted   noise level reduced   regular sleep/rest pattern promoted   room darkened     Problem: Adult Inpatient Plan of Care  Goal: Optimal Comfort and Wellbeing  Outcome: Progressing  Intervention: Provide Person-Centered Care  Flowsheets (Taken 5/2/2025 0223)  Trust Relationship/Rapport:   choices provided   emotional support provided   care explained   empathic listening provided   questions answered   questions encouraged   reassurance provided   thoughts/feelings acknowledged     Problem: Wound  Goal: Absence of Infection Signs and Symptoms  Outcome: Progressing  Intervention: Prevent or Manage Infection  Flowsheets (Taken 5/2/2025 0223)  Fever Reduction/Comfort Measures:   lightweight bedding   lightweight clothing

## 2025-05-02 NOTE — PROGRESS NOTES
[facility] - [facility]  Neurosurgery  Progress Note    Subjective:     Interval History: POD 2 right cranioplasty. NAEs overnight. RORO 80mL output. Patient reports post op head pain. No other complaints. Has been ambulating and tolerating PO    Post-Op Info:  Procedure(s) (LRB):  CRANIOPLASTY (Right)   2 Days Post-Op      Medications:  Continuous Infusions:   0.9% NaCl   Intravenous Continuous 100 mL/hr at 04/30/25 1336 New Bag at 04/30/25 1336     Scheduled Meds:   atorvastatin  40 mg Oral Daily    enoxparin  40 mg Subcutaneous Daily    famotidine (PF)  20 mg Intravenous Q12H    insulin glargine U-100 (Lantus)  25 Units Subcutaneous Daily    losartan  100 mg Oral Daily    melatonin  3 mg Oral Nightly    methylphenidate HCl  20 mg Per G Tube Daily    metoprolol tartrate  50 mg Oral BID    traZODone  150 mg Oral QHS     PRN Meds:  Current Facility-Administered Medications:     acetaminophen, 650 mg, Rectal, Q4H PRN    acetaminophen, 650 mg, Oral, Q4H PRN    dextrose 50%, 12.5 g, Intravenous, PRN    dextrose 50%, 25 g, Intravenous, PRN    glucagon (human recombinant), 1 mg, Intramuscular, PRN    glucose, 16 g, Oral, PRN    glucose, 24 g, Oral, PRN    HYDROcodone-acetaminophen, 1 tablet, Oral, Q4H PRN    insulin aspart U-100, 0-10 Units, Subcutaneous, QID (AC + HS) PRN    morphine, 2 mg, Intravenous, Q4H PRN    ondansetron, 4 mg, Intravenous, Q6H PRN    polyethylene glycol, 17 g, Oral, BID PRN    prochlorperazine, 5 mg, Intravenous, Q6H PRN     Review of Systems  Objective:     Weight: 76.7 kg (169 lb)  Body mass index is 22.92 kg/m².  Vital Signs (Most Recent):  Temp: 98.3 °F (36.8 °C) (05/02/25 0350)  Pulse: 70 (05/02/25 0350)  Resp: 18 (05/02/25 0350)  BP: 130/77 (05/02/25 0350)  SpO2: 96 % (05/02/25 0350) Vital Signs (24h Range):  Temp:  [97.7 °F (36.5 °C)-98.4 °F (36.9 °C)] 98.3 °F (36.8 °C)  Pulse:  [59-74] 70  Resp:  [18] 18  SpO2:  [93 %-96 %] 96 %  BP: (105-130)/(61-77) 130/77                  "             Closed/Suction Drain 04/30/25 Tube - 1 Right Scalp Bulb 10 Fr. (Active)   Site Description Unable to view 05/02/25 0500   Dressing Type Gauze 05/02/25 0500   Dressing Status Dry;Intact;Old drainage 05/02/25 0500   Dressing Intervention Integrity maintained 05/02/25 0500   Drainage Sanguineous 05/02/25 0500   Status To bulb suction 05/02/25 0500   Output (mL) 70 mL 05/02/25 0500       Neurosurgery Physical Exam  Awake. Alert, oriented.   NAD. Resting in bed.   Moves all extremities well  EOMI. Speech clear  Incision CDI with staples overlying.   RORO with bright red bloody output     Significant Labs:  No results for input(s): "GLU", "NA", "K", "CL", "CO2", "BUN", "CREATININE", "CALCIUM", "MG" in the last 48 hours.  No results for input(s): "WBC", "HGB", "HCT", "PLT" in the last 48 hours.  No results for input(s): "LABPT", "INR", "APTT" in the last 48 hours.  Microbiology Results (last 7 days)       ** No results found for the last 168 hours. **              Assessment/Plan:     Active Diagnoses:    Diagnosis Date Noted POA    PRINCIPAL PROBLEM:  Subdural hemorrhage following injury, no loss of consciousness [S06.5X0A] 04/30/2025 Yes      Problems Resolved During this Admission:     -RORO to gravity  -Home today with drain  -Will have patient follow up in office on 5/6/25 at 8am for drain removal     RICH Joseph  Neurosurgery  [facility]    "

## 2025-05-05 NOTE — DISCHARGE SUMMARY
[facility]  Neurosurgery  Discharge Summary      Patient Name: Ezequiel Ramires  MRN: [id number]  Admission Date: 4/30/2025  Hospital Length of Stay: 2 days  Discharge Date and Time: 5/2/2025 10:51 AM  Attending Physician: Samir Garcia MD  Discharging Provider: RICH Joseph  Primary Care Provider: Davy Stacy MD        Procedure(s) (LRB):  CRANIOPLASTY (Right)     Hospital Course: Mr. Ramires was brought in for right cranioplasty on 4/30/25. He tolerated the procedure without complication. He progressed well post operatively, was tolerating PO, was ambulating, and his pain was controlled. He was ready for discharge on 5/2/25. He was given instruction for wound care and post op precautions. He was given a prescription for pain medication and muscle relaxer. He was discharged home with follow up on 5/6/25 for drain removal.       Consults:     Significant Diagnostic Studies: N/A    Pending Diagnostic Studies:       None          Final Active Diagnoses:    Diagnosis Date Noted POA    PRINCIPAL PROBLEM:  Subdural hemorrhage following injury, no loss of consciousness [S06.5X0A] 04/30/2025 Yes      Problems Resolved During this Admission:      Discharged Condition: good    Disposition: Home or Self Care    Follow Up:   Follow-up Information       Samir Garcia MD Follow up on 5/6/2025.    Specialty: Neurosurgery  Why: at 8 am for drain removal  Contact information:  [address] [facility] [address]  [phone]                           Patient Instructions:      Diet general   Order Comments: RETURN TO PREVIOUS DIET     Wound care routine (specify)   Order Comments: Wound care - dressings should be left in place for 48hrs following surgery. Bandages may be removed after 2 days and left open to air. You will see steri strips under your bandages-- DO NOT PEEL THEM OFF!  The steri strips will fall off spontaneously.  Any remaining steri strips will be removed at your post op  visit.  Do NOT put any ointments, creams, or lotions on the incision unless otherwise instructed by your doctor.     Lifting restrictions   Order Comments: NO pushing, pulling or lifting for 6 weeks.  Do not lift anything heavier than 10 pounds.  At your post op visit you will be guided regarding increasing your activity.  Keep your automobile riding to a minimum and DO NOT drive until cleared by your doctor.     No driving, operating heavy equipment or signing legal documents while taking pain medication     Call MD for:  redness, tenderness, or signs of infection (pain, swelling, redness, odor or green/yellow discharge around incision site)     Call MD for:  persistent dizziness or light-headedness     Call MD for:  temperature >100.4     No dressing needed     Activity as tolerated   Scheduling Instructions: NO LIFTING OR STRAINING/ NO BENDING OR TWISTING     Medications:  Reconciled Home Medications:      Medication List        CONTINUE taking these medications      aspirin 81 MG Chew  Take 81 mg by mouth once daily.     ezetimibe 10 mg tablet  Commonly known as: ZETIA  Take 1 tablet by mouth once daily.     insulin glargine U-100 (Lantus) 100 unit/mL injection  Inject 45 Units into the skin 2 (two) times daily.     losartan 100 MG tablet  Commonly known as: COZAAR  Take 100 mg by mouth once daily.     melatonin 3 mg Tbdl  Take 3 mg by mouth every evening.     methylphenidate HCl 20 MG tablet  Commonly known as: RITALIN  1 tablet (20 mg total) by Per G Tube route 2 (two) times daily with meals.     metoprolol tartrate 50 MG tablet  Commonly known as: LOPRESSOR  Take 50 mg by mouth 2 (two) times daily.     multivitamin per tablet  Commonly known as: THERAGRAN  Take 1 tablet by mouth once daily.     polyethylene glycol 17 gram Pwpk  Commonly known as: GLYCOLAX  Take 17 g by mouth 2 (two) times daily as needed.     rosuvastatin 10 MG tablet  Commonly known as: CRESTOR  Take 1 tablet by mouth every evening.      senna 8.6 mg tablet  Commonly known as: SENOKOT  Take 1 tablet by mouth as needed for Constipation.     traZODone 150 MG tablet  Commonly known as: DESYREL  Take 150 mg by mouth every evening.     vitamin D 1000 units Tab  Commonly known as: VITAMIN D3  Take 1,000 Units by mouth once daily.            STOP taking these medications      enoxaparin 40 mg/0.4 mL Syrg  Commonly known as: LOVENOX     famotidine 20 MG tablet  Commonly known as: PEPCID     icosapent ethyL 1 gram Cap  Commonly known as: VASCEPA     insulin aspart U-100 100 unit/mL injection  Commonly known as: NovoLOG     oxyCODONE 5 MG immediate release tablet  Commonly known as: ROXICODONE     OZEMPIC SUBQ     sodium chloride 3% 3 % nebulizer solution              RICH Joseph  Neurosurgery  [facility]

## 2025-05-21 ENCOUNTER — TELEPHONE (OUTPATIENT)
Facility: CLINIC | Age: 65
End: 2025-05-21
Payer: MEDICARE

## 2025-05-21 NOTE — TELEPHONE ENCOUNTER
Contacted pt spouse asking if they saw Dr. Garcia and if he pulled the pt drain she stated yes. Also stated pt peg and trach was removed at [facility]. No further needs at this time. Pt is doing well

## 2025-07-29 DIAGNOSIS — S06.5X0A SUBDURAL HEMORRHAGE FOLLOWING INJURY, NO LOSS OF CONSCIOUSNESS: Primary | ICD-10-CM

## 2025-08-05 ENCOUNTER — HOSPITAL ENCOUNTER (OUTPATIENT)
Dept: RADIOLOGY | Facility: HOSPITAL | Age: 65
Discharge: HOME | End: 2025-08-05
Attending: NEUROLOGICAL SURGERY
Payer: MEDICARE

## 2025-08-05 DIAGNOSIS — S06.5X0A SUBDURAL HEMORRHAGE FOLLOWING INJURY, NO LOSS OF CONSCIOUSNESS: ICD-10-CM

## 2025-08-05 PROCEDURE — 70450 CT HEAD/BRAIN W/O DYE: CPT | Mod: TC

## (undated) DEVICE — POSITIONER HEAD ADULT

## (undated) DEVICE — NDL HYPO 22GX1 1/2 SYR 10ML LL

## (undated) DEVICE — PENCIL SMOKE EVAC TELSCP 15FT

## (undated) DEVICE — GLOVE PROTEXIS BLUE LATEX 8

## (undated) DEVICE — ELECTRODE BLADE INSULATED 1 IN

## (undated) DEVICE — ROUTER TAPERED 2.3MM

## (undated) DEVICE — DRESSING TELFA N ADH 3X8

## (undated) DEVICE — KIT SURGIFLO HEMOSTATIC MATRIX

## (undated) DEVICE — TUBING SILICON CLR 3/16IN 10FT

## (undated) DEVICE — TRAY SKIN SCRUB WET PREMIUM

## (undated) DEVICE — SUT VICRYL PLUS 3-0 X-1 18IN

## (undated) DEVICE — CLIPS RANEY SCALP FFS/ASSY

## (undated) DEVICE — DRAPE CRANIOTOMY T SURG STRL

## (undated) DEVICE — SEALANT ADHERUS DURAL AUTOSPRY

## (undated) DEVICE — TUBE SUCTION FRAZIER VENT 10FR

## (undated) DEVICE — CLOSURE SKIN STERI STRIP 1/2X4

## (undated) DEVICE — BOWL STERILE LARGE 32OZ

## (undated) DEVICE — KIT SURGICAL TURNOVER

## (undated) DEVICE — GLOVE PROTEXIS LTX MICRO  7.5

## (undated) DEVICE — SHEET AVIENE MICFIB 1.4X1.4IN

## (undated) DEVICE — STAPLER SKIN PROXIMATE WIDE

## (undated) DEVICE — Device

## (undated) DEVICE — RESERVOIR JACKSON-PRATT 100CC

## (undated) DEVICE — PERFORATOR SURG CRAN 14X11MM

## (undated) DEVICE — STRIP MEDI WND CLSR 1/2X4IN

## (undated) DEVICE — SUT 4/0 18IN NUROLON BLK B

## (undated) DEVICE — TRAY CATH 1-LYR URIMTR 16FR

## (undated) DEVICE — MARKER WRITESITE SKIN CHLRAPRP

## (undated) DEVICE — SUT PERMAHAND SLIK BLK 30IN

## (undated) DEVICE — ROUTER STRAIGHT RED 1.1MM

## (undated) DEVICE — ADHESIVE MASTISOL VIAL 48/BX

## (undated) DEVICE — BAND RUBBER STERILE 1/4X3.5IN

## (undated) DEVICE — TUBING IRR BIPOLAR CORD 12FT

## (undated) DEVICE — HEMOSTAT SURGICEL 2X14IN

## (undated) DEVICE — TUBE SUCTION MEDI-VAC STERILE

## (undated) DEVICE — COVER HD BACK TABLE 6FT

## (undated) DEVICE — POSITIONER HEEL FOAM CONVOLTD

## (undated) DEVICE — DRAPE SURG W/TWL 17 5/8X23

## (undated) DEVICE — SPONGE NEURO PATTIE 1X3IN

## (undated) DEVICE — SUT VICRYL 4-0 18 P-3

## (undated) DEVICE — KIT BIOSEAL BONE FLAP STRL

## (undated) DEVICE — SOL NACL IRR 1000ML BTL

## (undated) DEVICE — HEMOSTAT SURGICEL 4X8IN

## (undated) DEVICE — DRAIN ROUND SUCTION 10FR

## (undated) DEVICE — SOL .9NACL PF 100 ML

## (undated) DEVICE — SUT BONE WAX 2.5 GRMS 12/BX